# Patient Record
Sex: FEMALE | Race: BLACK OR AFRICAN AMERICAN | NOT HISPANIC OR LATINO | Employment: FULL TIME | URBAN - METROPOLITAN AREA
[De-identification: names, ages, dates, MRNs, and addresses within clinical notes are randomized per-mention and may not be internally consistent; named-entity substitution may affect disease eponyms.]

---

## 2017-06-10 DIAGNOSIS — F41.8 DEPRESSION WITH ANXIETY: ICD-10-CM

## 2017-06-10 DIAGNOSIS — F06.4 ANXIETY DISORDER DUE TO GENERAL MEDICAL CONDITION: ICD-10-CM

## 2017-06-12 RX ORDER — ALPRAZOLAM 0.5 MG/1
TABLET ORAL
Qty: 60 TABLET | Refills: 3 | OUTPATIENT
Start: 2017-06-12 | End: 2017-11-30 | Stop reason: SDUPTHER

## 2017-11-30 DIAGNOSIS — F41.8 DEPRESSION WITH ANXIETY: ICD-10-CM

## 2017-11-30 DIAGNOSIS — F06.4 ANXIETY DISORDER DUE TO GENERAL MEDICAL CONDITION: ICD-10-CM

## 2017-11-30 RX ORDER — ALPRAZOLAM 0.5 MG/1
TABLET ORAL
Qty: 60 TABLET | Refills: 5 | OUTPATIENT
Start: 2017-11-30 | End: 2018-01-24 | Stop reason: SDUPTHER

## 2017-12-31 DIAGNOSIS — F06.4 ANXIETY DISORDER DUE TO GENERAL MEDICAL CONDITION: ICD-10-CM

## 2017-12-31 DIAGNOSIS — G43.919 COMPLICATED MIGRAINE, INTRACTABLE: ICD-10-CM

## 2017-12-31 DIAGNOSIS — F41.8 DEPRESSION WITH ANXIETY: ICD-10-CM

## 2018-01-02 RX ORDER — PROPRANOLOL HYDROCHLORIDE 120 MG/1
CAPSULE, EXTENDED RELEASE ORAL
Qty: 30 CAPSULE | Refills: 0 | OUTPATIENT
Start: 2018-01-02

## 2018-01-02 RX ORDER — ALPRAZOLAM 0.5 MG/1
TABLET ORAL
Qty: 60 TABLET | Refills: 0 | OUTPATIENT
Start: 2018-01-02

## 2018-01-02 RX ORDER — OLMESARTAN MEDOXOMIL AND HYDROCHLOROTHIAZIDE 40; 25 MG/1; MG/1
TABLET, FILM COATED ORAL
Qty: 30 TABLET | Refills: 0 | OUTPATIENT
Start: 2018-01-02

## 2018-01-18 LAB
25(OH)D3+25(OH)D2 SERPL-MCNC: 69.3 NG/ML (ref 30–100)
ALBUMIN SERPL-MCNC: 4.5 G/DL (ref 3.5–5.2)
ALBUMIN/GLOB SERPL: 1.4 G/DL
ALP SERPL-CCNC: 72 U/L (ref 39–117)
ALT SERPL-CCNC: 22 U/L (ref 1–33)
APPEARANCE UR: ABNORMAL
AST SERPL-CCNC: 19 U/L (ref 1–32)
BILIRUB SERPL-MCNC: 0.5 MG/DL (ref 0.1–1.2)
BILIRUB UR QL STRIP: NEGATIVE
BUN SERPL-MCNC: 11 MG/DL (ref 6–20)
BUN/CREAT SERPL: 11.1 (ref 7–25)
CALCIUM SERPL-MCNC: 9.9 MG/DL (ref 8.6–10.5)
CHLORIDE SERPL-SCNC: 102 MMOL/L (ref 98–107)
CHOLEST SERPL-MCNC: 185 MG/DL (ref 100–199)
CO2 SERPL-SCNC: 25.6 MMOL/L (ref 22–29)
COLOR UR: YELLOW
CREAT SERPL-MCNC: 0.99 MG/DL (ref 0.57–1)
ERYTHROCYTE [DISTWIDTH] IN BLOOD BY AUTOMATED COUNT: 13.4 % (ref 11.7–13)
GLOBULIN SER CALC-MCNC: 3.2 GM/DL
GLUCOSE SERPL-MCNC: 95 MG/DL (ref 65–99)
GLUCOSE UR QL: NEGATIVE
HCT VFR BLD AUTO: 42.6 % (ref 35.6–45.5)
HDL SERPL-SCNC: 32.4 UMOL/L
HDLC SERPL-MCNC: 45 MG/DL
HGB BLD-MCNC: 14.2 G/DL (ref 11.9–15.5)
HGB UR QL STRIP: NEGATIVE
KETONES UR QL STRIP: NEGATIVE
LDL SERPL QN: 21 NM
LDL SERPL-SCNC: 1434 NMOL/L
LDL SMALL SERPL-SCNC: 474 NMOL/L
LDLC SERPL CALC-MCNC: 121 MG/DL (ref 0–99)
LEUKOCYTE ESTERASE UR QL STRIP: NEGATIVE
LP-IR SCORE SERPL: 56
MCH RBC QN AUTO: 30.9 PG (ref 26.9–32)
MCHC RBC AUTO-ENTMCNC: 33.3 G/DL (ref 32.4–36.3)
MCV RBC AUTO: 92.6 FL (ref 80.5–98.2)
NITRITE UR QL STRIP: NEGATIVE
PH UR STRIP: 5.5 [PH] (ref 5–8)
PLATELET # BLD AUTO: 318 10*3/MM3 (ref 140–500)
POTASSIUM SERPL-SCNC: 4 MMOL/L (ref 3.5–5.2)
PROT SERPL-MCNC: 7.7 G/DL (ref 6–8.5)
PROT UR QL STRIP: NEGATIVE
RBC # BLD AUTO: 4.6 10*6/MM3 (ref 3.9–5.2)
SODIUM SERPL-SCNC: 141 MMOL/L (ref 136–145)
SP GR UR: 1.02 (ref 1–1.03)
T3FREE SERPL-MCNC: 2.8 PG/ML (ref 2–4.4)
T4 FREE SERPL-MCNC: 1.14 NG/DL (ref 0.93–1.7)
TRIGL SERPL-MCNC: 93 MG/DL (ref 0–149)
TSH SERPL DL<=0.005 MIU/L-ACNC: 0.98 MIU/ML (ref 0.27–4.2)
UROBILINOGEN UR STRIP-MCNC: ABNORMAL MG/DL
WBC # BLD AUTO: 7.88 10*3/MM3 (ref 4.5–10.7)

## 2018-01-24 ENCOUNTER — OFFICE VISIT (OUTPATIENT)
Dept: INTERNAL MEDICINE | Facility: CLINIC | Age: 53
End: 2018-01-24

## 2018-01-24 ENCOUNTER — TRANSCRIBE ORDERS (OUTPATIENT)
Dept: ADMINISTRATIVE | Facility: HOSPITAL | Age: 53
End: 2018-01-24

## 2018-01-24 VITALS
HEIGHT: 62 IN | HEART RATE: 95 BPM | DIASTOLIC BLOOD PRESSURE: 82 MMHG | SYSTOLIC BLOOD PRESSURE: 140 MMHG | OXYGEN SATURATION: 99 % | WEIGHT: 190 LBS | BODY MASS INDEX: 34.96 KG/M2

## 2018-01-24 DIAGNOSIS — F06.4 ANXIETY DISORDER DUE TO GENERAL MEDICAL CONDITION: Chronic | ICD-10-CM

## 2018-01-24 DIAGNOSIS — Z11.59 NEED FOR HEPATITIS C SCREENING TEST: ICD-10-CM

## 2018-01-24 DIAGNOSIS — E78.5 HYPERLIPIDEMIA, UNSPECIFIED HYPERLIPIDEMIA TYPE: Chronic | ICD-10-CM

## 2018-01-24 DIAGNOSIS — Z01.419 ENCOUNTER FOR ANNUAL ROUTINE GYNECOLOGICAL EXAMINATION: ICD-10-CM

## 2018-01-24 DIAGNOSIS — E55.9 VITAMIN D DEFICIENCY: Chronic | ICD-10-CM

## 2018-01-24 DIAGNOSIS — Z12.39 BREAST CANCER SCREENING: ICD-10-CM

## 2018-01-24 DIAGNOSIS — Z23 NEED FOR TDAP VACCINATION: ICD-10-CM

## 2018-01-24 DIAGNOSIS — Z51.81 THERAPEUTIC DRUG MONITORING: ICD-10-CM

## 2018-01-24 DIAGNOSIS — Z00.00 ROUTINE PHYSICAL EXAMINATION: Primary | ICD-10-CM

## 2018-01-24 DIAGNOSIS — I10 BENIGN ESSENTIAL HYPERTENSION: Chronic | ICD-10-CM

## 2018-01-24 DIAGNOSIS — IMO0002 CHRONIC MIGRAINE: Chronic | ICD-10-CM

## 2018-01-24 DIAGNOSIS — I65.21 ATHEROSCLEROSIS OF RIGHT CAROTID ARTERY: Chronic | ICD-10-CM

## 2018-01-24 DIAGNOSIS — F41.8 DEPRESSION WITH ANXIETY: Chronic | ICD-10-CM

## 2018-01-24 DIAGNOSIS — Z12.31 SCREENING MAMMOGRAM, ENCOUNTER FOR: Primary | ICD-10-CM

## 2018-01-24 DIAGNOSIS — G43.919 COMPLICATED MIGRAINE, INTRACTABLE: ICD-10-CM

## 2018-01-24 PROCEDURE — 90715 TDAP VACCINE 7 YRS/> IM: CPT | Performed by: INTERNAL MEDICINE

## 2018-01-24 PROCEDURE — 99396 PREV VISIT EST AGE 40-64: CPT | Performed by: INTERNAL MEDICINE

## 2018-01-24 PROCEDURE — 90471 IMMUNIZATION ADMIN: CPT | Performed by: INTERNAL MEDICINE

## 2018-01-24 RX ORDER — PROPRANOLOL HYDROCHLORIDE 120 MG/1
120 CAPSULE, EXTENDED RELEASE ORAL DAILY
Qty: 30 CAPSULE | Refills: 6 | Status: SHIPPED | OUTPATIENT
Start: 2018-07-31 | End: 2018-09-04

## 2018-01-24 RX ORDER — ALPRAZOLAM 0.5 MG/1
TABLET ORAL
Qty: 60 TABLET | Refills: 5 | Status: SHIPPED | OUTPATIENT
Start: 2018-01-24 | End: 2018-08-02 | Stop reason: SDUPTHER

## 2018-01-24 RX ORDER — ALPRAZOLAM 0.5 MG/1
TABLET ORAL
Qty: 60 TABLET | Refills: 5 | OUTPATIENT
Start: 2018-01-24 | End: 2018-01-24 | Stop reason: SDUPTHER

## 2018-01-24 RX ORDER — OLMESARTAN MEDOXOMIL AND HYDROCHLOROTHIAZIDE 40/25 40; 25 MG/1; MG/1
1 TABLET ORAL DAILY
Qty: 30 TABLET | Refills: 9 | Status: SHIPPED | OUTPATIENT
Start: 2018-07-31 | End: 2019-01-30

## 2018-01-24 NOTE — PROGRESS NOTES
01/24/2018    Patient Information  Ruba Ohara                                                                                          [address] [facility] [address]      1965  [phone]    Chief Complaint:     Routine physical examination and follow-up lab work.  No new acute complaints.    History of Present Illness:    Patient with a history of hyperlipidemia, mild carotid artery plaque, hypertension anxiety disorder and history of migraine headaches.  She presents today for a routine annual physical examination and follow-up lab work.  She reports she has been out of her medications for about 2 weeks.  Past medical history reviewed and updated where necessary including health maintenance parameters.  This reveals she needs a Pap smear, mammogram, and hepatitis C screening.  She also needs a TDaP which we can give today.    Review of Systems   Constitution: Negative.   HENT: Negative.    Eyes: Negative.    Cardiovascular: Negative.    Respiratory: Negative.    Endocrine: Negative.    Hematologic/Lymphatic: Negative.    Skin: Negative.    Musculoskeletal: Negative.    Gastrointestinal: Negative.    Genitourinary: Negative.    Neurological: Negative.    Psychiatric/Behavioral: Negative.    Allergic/Immunologic: Negative.        Active Problems:    Patient Active Problem List   Diagnosis   • Anxiety disorder due to general medical condition   • Benign essential hypertension   • Carotid artery plaque, 10/29/2013--right ICA plaque.  Normal left ICA.   • Hirsutism   • Hyperlipidemia   • Migraine headaches   • Vitamin D deficiency   • Therapeutic drug monitoring   • Routine physical examination   • Depression with anxiety         Past Medical History:   Diagnosis Date   • Anxiety disorder due to general medical condition 4/14/2016   • Benign essential hypertension 4/12/2006 04/12/2006--treatment for hypertension begun.   • Carotid artery plaque,  10/29/2013--right ICA plaque.  Normal left ICA. 10/29/2013    10/29/2013--carotid Doppler revealed plaque without stenosis in the right internal carotid artery. There was no evidence of hemodynamically significant stenosis in the left carotid system. Antegrade flow was present in the left vertebral artery.   • Depression with anxiety 4/26/2016   • Hirsutism 4/14/2016   • History of carotid Doppler/vascular screen 10/29/2013    10/29/2013--carotid Doppler revealed plaque without stenosis in the right internal carotid artery. There was no evidence of hemodynamically significant stenosis in the left carotid system. Antegrade flow was present in the left vertebral artery.   • History of echocardiogram 10/29/2013    10/29/2013--echocardiogram performed for possible TIA symptoms revealed normal left ventricular size thickness and function. There were no significant valvular abnormalities.   • History of iron deficiency anemia 07/21/2015 07/21/2015--patient was admitted briefly to the hospital with a hemoglobin of 7.3. This was believed to be due to an episode of rectal bleeding, but primarily due to menorrhagia.   • History of mammogram 08/25/2015 08/25/2015--baseline screening mammogram reveals asymmetric density 12 o'clock position retroareolar right breast. Additional spot compression views in the CC and MLO projections with CC rolled views and medial lateral view recommended. No suspicious calcifications. Fibroglandular pattern in the left breast within normal limits. Scattered fibroglandular densities bilaterally. False-negative rate i   • History of meningioma 08/19/2014 08/19/2014--MRI again performed for left sided facial and arm numbness and tingling.  This reveals status post resection of a meningioma via a left temporoparietal craniotomy.  There is no evidence to suggest residual or recurrent tumor.  Again noted is a focus of encephalomalacia within the anterior portion of the left temporal lobe deep  to the craniotomy flap.  10/24/2013--MRI of the brain perfo   • History of Seizure disorder, grand mal 4/14/2016    This was related to a benign brain tumor of the left temporal lobe that is believed to be a meningioma. Status post resection and no recurrence of the seizures.   • Hyperlipidemia 2/7/2012 02/07/2012--treatment for hyperlipidemia begun.   • Migraine headaches 10/22/2013    08/19/2014--MRI again performed for left sided facial and arm numbness and tingling.  This reveals status post resection of a meningioma via a left temporoparietal craniotomy.  There is no evidence to suggest residual or recurrent tumor.  Again noted is a focus of encephalomalacia within the anterior portion of the left temporal lobe deep to the craniotomy flap.  10/29/2013--echocardiogram performed for possible TIA symptoms revealed normal left ventricular size thickness and function. There were no significant valvular abnormalities.  10/24/2013--MRI performed for possible TIA. Prior large 10 cm lateral left temporoparietal craniotomy and an additional 3.5 cm mid to inferior left  temporal craniectomy  by history resection of a meningioma. There is encephalomalacia in anterior inferior left temporal lobe which tracks approximately 3 x 2.1 cm. The remainder of the brain parenchyma is normal in signal intensity. Ventricles are normal. No mass effect noted. No midline shift and no extra-axial fluid collecti   • Vitamin D deficiency 4/14/2016         Past Surgical History:   Procedure Laterality Date   • BRAIN MENINGIOMA EXCISION  26 years old    26 years of age--status post resection of benign brain tumor believed to be a meningioma. Located in the anterior aspect of the left temporal lobe. 10/24/2013--MRI of the brain performed for possible TIA revealed surgical changes of a lateral left temporal parietal craniotomy as well as mid to inferior squamosal left temporal craniotomy consistent with patient's previous surgery. There was a  "3.5 x   • COLONOSCOPY  08/20/2015 08/20/2015--entirely normal screening colonoscopy.   • PARTIAL HYSTERECTOMY  2007 2007--partial vaginal hysterectomy.   • TUBAL ABDOMINAL LIGATION  28 years old    28 years old--tubal ligation.         No Known Allergies        Current Outpatient Prescriptions:   •  ALPRAZolam (XANAX) 0.5 MG tablet, TAKE 1 TABLET BY MOUTH TWICE DAILY, Disp: 60 tablet, Rfl: 5  •  aspirin  MG EC tablet, Take  by mouth daily., Disp: , Rfl:   •  Cholecalciferol (VITAMIN D3) 5000 UNITS capsule capsule, 1 by mouth daily as directed, Disp: 30 capsule, Rfl: 11  •  olmesartan-hydrochlorothiazide (BENICAR HCT) 40-25 MG per tablet, Take 1 tablet by mouth Daily., Disp: 30 tablet, Rfl: 11  •  propranolol LA (INDERAL LA) 120 MG 24 hr capsule, One by mouth daily for migraine and blood pressure, Disp: 30 capsule, Rfl: 11      Family History   Problem Relation Age of Onset   • Throat cancer Father    • Stroke Father    • Hypertension Other      Benign Essential   • Stroke Brother          Social History     Social History   • Marital status: Single     Spouse name: N/A   • Number of children: N/A   • Years of education: N/A     Occupational History   •  for [employer]      Social History Main Topics   • Smoking status: Never Smoker   • Smokeless tobacco: Never Used   • Alcohol use Yes      Comment: Socially   • Drug use: No   • Sexual activity: Yes     Partners: Male     Other Topics Concern   • Not on file     Social History Narrative         Vitals:    01/24/18 0911   BP: 140/82   Pulse: 95   SpO2: 99%   Weight: 86.2 kg (190 lb)   Height: 157.5 cm (62.01\")          Physical Exam:    General: Alert and oriented x 3, with appropriate affect; no acute distress.  HEENT: pupils equal, round, and reactive to light; extraocular movements intact; sclera nonicteric; nasal mucosa normal; pharynx normal; tympanic membranes and ear canals normal.  Neck: without JVD, thyromegaly, bruit, or adenopathy. "  Lungs: clear to auscultation in all fields.  Heart: auscultation reveals regular rate and rhythm without murmur, rub, gallop, or click.  Abdomen: is soft and nontender, without hepatosplenomegaly, mass or hernia. Normal bowel sounds.  GYN, Digital rectal exam, Breast; deferred to gynecologist.  Extremities: are without clubbing, cyanosis, or edema.  Vascular: no signs of peripheral arterial disease or venous insufficiency/varicosities.  Neurological: intact without focal deficit, including cranial and peripheral nerves.  Station and gait observed to be normal during ingress and egress from the examination area.  Sensation and deep tendon reflexes tested if clinically indicated and are normal.  Musculoskeletal: exam is normal, without signs of synovitis, significant degeneration or deformity. Skin examination: without rash or significant lesions.    Lab/other results:    NMR reveals a total cholesterol 185.  Triglycerides 93.  LDL particle number borderline at 1434.  However, small LDL particle numbers excellent at 474.  HDL particle number normal at 32.4.  CMP is normal.  Urinalysis normal.  CBC normal.  Thyroid function tests normal.  Vitamin D normal.    Assessment/Plan:     Diagnosis Plan   1. Routine physical examination     2. Hyperlipidemia, unspecified hyperlipidemia type     3. Carotid artery plaque, 10/29/2013--right ICA plaque.  Normal left ICA.     4. Benign essential hypertension     5. Anxiety disorder due to general medical condition     6. Depression with anxiety     7. Vitamin D deficiency     8. Migraine headaches     9. Therapeutic drug monitoring       Patient presents with essentially normal annual exam except for the following issues: She has hyperlipidemia but it is not bad enough to warrant medication.  Carotid artery plaque needs to be reassessed with a Doppler study.  Blood pressure is elevated because she's been out of her Benicar HCT.  She has been taking one half of the pill per day.   Anxiety controlled with Xanax.  Vitamin D is therapeutic.  She has migraine headaches and has been out of propranolol prophylaxis.    Plan is as follows: Refill medications.  Patient will follow-up in one year with lab prior for her annual physical schedule mammogram, GYN appointment, DEXA scan.  TDaP given.  We will do a hepatitis C screening next year.        Procedures

## 2018-02-01 ENCOUNTER — HOSPITAL ENCOUNTER (OUTPATIENT)
Dept: CARDIOLOGY | Facility: HOSPITAL | Age: 53
Discharge: HOME | End: 2018-02-01
Admitting: INTERNAL MEDICINE

## 2018-02-01 LAB
BH CV XLRA MEAS LEFT DIST CCA EDV: -32 CM/SEC
BH CV XLRA MEAS LEFT DIST CCA PSV: -96 CM/SEC
BH CV XLRA MEAS LEFT DIST ICA EDV: 42.7 CM/SEC
BH CV XLRA MEAS LEFT DIST ICA PSV: 97.7 CM/SEC
BH CV XLRA MEAS LEFT ICA/CCA RATIO: 1
BH CV XLRA MEAS LEFT MID ICA EDV: -36 CM/SEC
BH CV XLRA MEAS LEFT MID ICA PSV: -84.4 CM/SEC
BH CV XLRA MEAS LEFT PROX CCA EDV: 24.9 CM/SEC
BH CV XLRA MEAS LEFT PROX CCA PSV: 116 CM/SEC
BH CV XLRA MEAS LEFT PROX ECA EDV: -33.2 CM/SEC
BH CV XLRA MEAS LEFT PROX ECA PSV: -182 CM/SEC
BH CV XLRA MEAS LEFT PROX ICA EDV: -23.7 CM/SEC
BH CV XLRA MEAS LEFT PROX ICA PSV: -72.1 CM/SEC
BH CV XLRA MEAS LEFT PROX SCLA PSV: 175 CM/SEC
BH CV XLRA MEAS LEFT VERTEBRAL A EDV: 12.3 CM/SEC
BH CV XLRA MEAS LEFT VERTEBRAL A PSV: 50.3 CM/SEC
BH CV XLRA MEAS RIGHT CCA RATIO VEL: 76.4 CM/SEC
BH CV XLRA MEAS RIGHT DIST CCA EDV: 21.2 CM/SEC
BH CV XLRA MEAS RIGHT DIST CCA PSV: 76.4 CM/SEC
BH CV XLRA MEAS RIGHT DIST ICA EDV: -29.4 CM/SEC
BH CV XLRA MEAS RIGHT DIST ICA PSV: -92 CM/SEC
BH CV XLRA MEAS RIGHT ICA RATIO VEL: -92 CM/SEC
BH CV XLRA MEAS RIGHT ICA/CCA RATIO: -1.2
BH CV XLRA MEAS RIGHT MID ICA EDV: -31.3 CM/SEC
BH CV XLRA MEAS RIGHT MID ICA PSV: -71.1 CM/SEC
BH CV XLRA MEAS RIGHT PROX CCA EDV: 20.5 CM/SEC
BH CV XLRA MEAS RIGHT PROX CCA PSV: 89.2 CM/SEC
BH CV XLRA MEAS RIGHT PROX ECA EDV: -17 CM/SEC
BH CV XLRA MEAS RIGHT PROX ECA PSV: -151 CM/SEC
BH CV XLRA MEAS RIGHT PROX ICA EDV: -17.1 CM/SEC
BH CV XLRA MEAS RIGHT PROX ICA PSV: -71.5 CM/SEC
BH CV XLRA MEAS RIGHT PROX SCLA PSV: 212 CM/SEC
BH CV XLRA MEAS RIGHT VERTEBRAL A EDV: 18.4 CM/SEC
BH CV XLRA MEAS RIGHT VERTEBRAL A PSV: 53.8 CM/SEC
LEFT ARM BP: NORMAL MMHG
RIGHT ARM BP: NORMAL MMHG

## 2018-02-01 PROCEDURE — 93880 EXTRACRANIAL BILAT STUDY: CPT

## 2018-07-31 DIAGNOSIS — F06.4 ANXIETY DISORDER DUE TO GENERAL MEDICAL CONDITION: Chronic | ICD-10-CM

## 2018-07-31 DIAGNOSIS — F41.8 DEPRESSION WITH ANXIETY: Chronic | ICD-10-CM

## 2018-07-31 RX ORDER — ALPRAZOLAM 0.5 MG/1
TABLET ORAL
Qty: 60 TABLET | Refills: 5 | Status: CANCELLED | OUTPATIENT
Start: 2018-07-31

## 2018-08-02 DIAGNOSIS — F06.4 ANXIETY DISORDER DUE TO GENERAL MEDICAL CONDITION: Chronic | ICD-10-CM

## 2018-08-02 DIAGNOSIS — F41.8 DEPRESSION WITH ANXIETY: Chronic | ICD-10-CM

## 2018-08-02 RX ORDER — ALPRAZOLAM 0.5 MG/1
TABLET ORAL
Qty: 60 TABLET | Refills: 3 | OUTPATIENT
Start: 2018-08-02 | End: 2019-02-25

## 2018-08-20 ENCOUNTER — HOSPITAL ENCOUNTER (EMERGENCY)
Facility: HOSPITAL | Age: 53
Discharge: HOME | End: 2018-08-20
Attending: EMERGENCY MEDICINE | Admitting: EMERGENCY MEDICINE

## 2018-08-20 ENCOUNTER — APPOINTMENT (OUTPATIENT)
Dept: GENERAL RADIOLOGY | Facility: HOSPITAL | Age: 53
End: 2018-08-20

## 2018-08-20 VITALS
HEART RATE: 57 BPM | TEMPERATURE: 97.7 F | RESPIRATION RATE: 16 BRPM | DIASTOLIC BLOOD PRESSURE: 88 MMHG | HEIGHT: 62 IN | BODY MASS INDEX: 35.7 KG/M2 | WEIGHT: 194 LBS | SYSTOLIC BLOOD PRESSURE: 118 MMHG | OXYGEN SATURATION: 100 %

## 2018-08-20 DIAGNOSIS — R07.89 CHEST WALL PAIN: Primary | ICD-10-CM

## 2018-08-20 LAB
ALBUMIN SERPL-MCNC: 4.5 G/DL (ref 3.5–5.2)
ALBUMIN/GLOB SERPL: 1.6 G/DL
ALP SERPL-CCNC: 89 U/L (ref 39–117)
ALT SERPL W P-5'-P-CCNC: 23 U/L (ref 1–33)
ANION GAP SERPL CALCULATED.3IONS-SCNC: 9.8 MMOL/L
AST SERPL-CCNC: 17 U/L (ref 1–32)
BASOPHILS # BLD AUTO: 0.02 10*3/MM3 (ref 0–0.2)
BASOPHILS NFR BLD AUTO: 0.3 % (ref 0–1.5)
BILIRUB SERPL-MCNC: 0.4 MG/DL (ref 0.1–1.2)
BUN BLD-MCNC: 10 MG/DL (ref 6–20)
BUN/CREAT SERPL: 9.3 (ref 7–25)
CALCIUM SPEC-SCNC: 9.5 MG/DL (ref 8.6–10.5)
CHLORIDE SERPL-SCNC: 101 MMOL/L (ref 98–107)
CO2 SERPL-SCNC: 31.2 MMOL/L (ref 22–29)
CREAT BLD-MCNC: 1.07 MG/DL (ref 0.57–1)
D DIMER PPP FEU-MCNC: <0.27 MCGFEU/ML (ref 0–0.49)
DEPRECATED RDW RBC AUTO: 43.3 FL (ref 37–54)
EOSINOPHIL # BLD AUTO: 0.11 10*3/MM3 (ref 0–0.7)
EOSINOPHIL NFR BLD AUTO: 1.5 % (ref 0.3–6.2)
ERYTHROCYTE [DISTWIDTH] IN BLOOD BY AUTOMATED COUNT: 13.1 % (ref 11.7–13)
GFR SERPL CREATININE-BSD FRML MDRD: 65 ML/MIN/1.73
GLOBULIN UR ELPH-MCNC: 2.9 GM/DL
GLUCOSE BLD-MCNC: 97 MG/DL (ref 65–99)
HCT VFR BLD AUTO: 39.9 % (ref 35.6–45.5)
HGB BLD-MCNC: 13.7 G/DL (ref 11.9–15.5)
IMM GRANULOCYTES # BLD: 0.01 10*3/MM3 (ref 0–0.03)
IMM GRANULOCYTES NFR BLD: 0.1 % (ref 0–0.5)
LYMPHOCYTES # BLD AUTO: 1.92 10*3/MM3 (ref 0.9–4.8)
LYMPHOCYTES NFR BLD AUTO: 26.8 % (ref 19.6–45.3)
MCH RBC QN AUTO: 30.9 PG (ref 26.9–32)
MCHC RBC AUTO-ENTMCNC: 34.3 G/DL (ref 32.4–36.3)
MCV RBC AUTO: 89.9 FL (ref 80.5–98.2)
MONOCYTES # BLD AUTO: 0.53 10*3/MM3 (ref 0.2–1.2)
MONOCYTES NFR BLD AUTO: 7.4 % (ref 5–12)
NEUTROPHILS # BLD AUTO: 4.58 10*3/MM3 (ref 1.9–8.1)
NEUTROPHILS NFR BLD AUTO: 64 % (ref 42.7–76)
PLATELET # BLD AUTO: 242 10*3/MM3 (ref 140–500)
PMV BLD AUTO: 10.8 FL (ref 6–12)
POTASSIUM BLD-SCNC: 4.2 MMOL/L (ref 3.5–5.2)
PROT SERPL-MCNC: 7.4 G/DL (ref 6–8.5)
RBC # BLD AUTO: 4.44 10*6/MM3 (ref 3.9–5.2)
SODIUM BLD-SCNC: 142 MMOL/L (ref 136–145)
TROPONIN T SERPL-MCNC: <0.01 NG/ML (ref 0–0.03)
WBC NRBC COR # BLD: 7.16 10*3/MM3 (ref 4.5–10.7)

## 2018-08-20 PROCEDURE — 96374 THER/PROPH/DIAG INJ IV PUSH: CPT

## 2018-08-20 PROCEDURE — 84484 ASSAY OF TROPONIN QUANT: CPT | Performed by: EMERGENCY MEDICINE

## 2018-08-20 PROCEDURE — 85379 FIBRIN DEGRADATION QUANT: CPT | Performed by: EMERGENCY MEDICINE

## 2018-08-20 PROCEDURE — 25010000002 KETOROLAC TROMETHAMINE PER 15 MG: Performed by: EMERGENCY MEDICINE

## 2018-08-20 PROCEDURE — 99283 EMERGENCY DEPT VISIT LOW MDM: CPT

## 2018-08-20 PROCEDURE — 71046 X-RAY EXAM CHEST 2 VIEWS: CPT

## 2018-08-20 PROCEDURE — 93010 ELECTROCARDIOGRAM REPORT: CPT | Performed by: INTERNAL MEDICINE

## 2018-08-20 PROCEDURE — 93005 ELECTROCARDIOGRAM TRACING: CPT | Performed by: EMERGENCY MEDICINE

## 2018-08-20 PROCEDURE — 85025 COMPLETE CBC W/AUTO DIFF WBC: CPT | Performed by: EMERGENCY MEDICINE

## 2018-08-20 PROCEDURE — 80053 COMPREHEN METABOLIC PANEL: CPT | Performed by: EMERGENCY MEDICINE

## 2018-08-20 RX ORDER — NAPROXEN SODIUM 550 MG/1
550 TABLET ORAL 2 TIMES DAILY WITH MEALS
Qty: 20 TABLET | Refills: 0 | Status: SHIPPED | OUTPATIENT
Start: 2018-08-20 | End: 2019-07-25 | Stop reason: SDUPTHER

## 2018-08-20 RX ORDER — SODIUM CHLORIDE 0.9 % (FLUSH) 0.9 %
10 SYRINGE (ML) INJECTION AS NEEDED
Status: DISCONTINUED | OUTPATIENT
Start: 2018-08-20 | End: 2018-08-20 | Stop reason: HOSPADM

## 2018-08-20 RX ORDER — KETOROLAC TROMETHAMINE 15 MG/ML
15 INJECTION, SOLUTION INTRAMUSCULAR; INTRAVENOUS ONCE
Status: COMPLETED | OUTPATIENT
Start: 2018-08-20 | End: 2018-08-20

## 2018-08-20 RX ADMIN — KETOROLAC TROMETHAMINE 15 MG: 15 INJECTION, SOLUTION INTRAMUSCULAR; INTRAVENOUS at 09:03

## 2018-08-20 NOTE — ED TRIAGE NOTES
"Patient states \"I am having left sided chest pain that began about 8 hours ago.\" Patient also complains of dizziness and headache.  "

## 2018-08-20 NOTE — ED PROVIDER NOTES
EMERGENCY DEPARTMENT ENCOUNTER    Room Number:  12/12  Date seen:  8/20/2018  Time seen: 8:23 AM  PCP: Víctor Pike MD  Historian: patient, family  History Limited By: N/A      HPI:  Chief Complaint: chest pain  Context: Ruba Ohara is a 53 y.o. female who states that she has had recent increased stress. She presents to the ED c/o constant nonradiating sharp left chest pain that started at about 0000 today. It is exacerbated by palpating the left chest, stress, cough, and taking deep breaths. She reports that she has also had dizziness and dry cough. She denies nausea, vomiting, sweating, dyspnea, BLE swelling, syncope, focal weakness, numbness, vision changes, abd pain, fevers, chills, recent travel, and personal hx of heart disease/hyperlipidemia/diabetes. She notes that she had similar chest pain in the past but did not undergo cardiac workup. She has hx of HTN and is a nonsmoker. Pt has no other complaints at this time.     Location: left chest  Radiation: none  Quality: sharp  Intensity/Severity: moderate  Duration: started today at about 0000  Onset quality: gradual  Timing: constant  Progression: unchanged  Aggravating Factors: palpating left chest, stress, cough, taking deep breaths  Alleviating Factors: none  Previous Episodes: Pt notes that she had similar chest pain in the past but did not undergo cardiac workup.  Treatment before arrival: none  Associated Symptoms: dizziness, dry cough        PAST MEDICAL HISTORY  Active Ambulatory Problems     Diagnosis Date Noted   • Anxiety disorder due to general medical condition 04/14/2016   • Benign essential hypertension 04/12/2006   • Carotid artery plaque, 10/29/2013--right ICA plaque.  Normal left ICA. 10/29/2013   • Hirsutism 04/14/2016   • Hyperlipidemia 02/07/2012   • Migraine headaches 10/22/2013   • Vitamin D deficiency 04/14/2016   • Therapeutic drug monitoring 04/25/2016   • Routine physical examination 04/25/2016   • Depression with  anxiety 04/26/2016     Resolved Ambulatory Problems     Diagnosis Date Noted   • History of transient cerebral ischemia 04/14/2016   • History of Doppler ultrasound of Artery: Carotid 04/14/2016   • History of echocardiogram 04/14/2016   • History of mammogram 04/14/2016   • History of Iron deficiency anemia due to chronic blood loss 04/14/2016   • History of Seizure disorder, grand mal 04/14/2016   • Complicated migraine, intractable 10/22/2013   • History of meningioma 04/26/2016   • Family history of throat cancer 11/21/2016     Past Medical History:   Diagnosis Date   • Anxiety disorder due to general medical condition 4/14/2016   • Benign essential hypertension 4/12/2006   • Carotid artery plaque, 10/29/2013--right ICA plaque.  Normal left ICA. 10/29/2013   • Depression with anxiety 4/26/2016   • Hirsutism 4/14/2016   • History of carotid Doppler/vascular screen 10/29/2013   • History of echocardiogram 10/29/2013   • History of iron deficiency anemia 07/21/2015   • History of mammogram 08/25/2015   • History of meningioma 08/19/2014   • History of Seizure disorder, grand mal 4/14/2016   • Hyperlipidemia 2/7/2012   • Migraine headaches 10/22/2013   • Vitamin D deficiency 4/14/2016         PAST SURGICAL HISTORY  Past Surgical History:   Procedure Laterality Date   • BRAIN MENINGIOMA EXCISION  26 years old    26 years of age--status post resection of benign brain tumor believed to be a meningioma. Located in the anterior aspect of the left temporal lobe. 10/24/2013--MRI of the brain performed for possible TIA revealed surgical changes of a lateral left temporal parietal craniotomy as well as mid to inferior squamosal left temporal craniotomy consistent with patient's previous surgery. There was a 3.5 x   • COLONOSCOPY  08/20/2015 08/20/2015--entirely normal screening colonoscopy.   • PARTIAL HYSTERECTOMY  2007 2007--partial vaginal hysterectomy.   • TUBAL ABDOMINAL LIGATION  28 years old    28 years  old--tubal ligation.         FAMILY HISTORY  Family History   Problem Relation Age of Onset   • Throat cancer Father    • Stroke Father    • Hypertension Other         Benign Essential   • Stroke Brother          SOCIAL HISTORY  Social History     Social History   • Marital status:      Spouse name: N/A   • Number of children: N/A   • Years of education: N/A     Occupational History   •  for [employer]      Social History Main Topics   • Smoking status: Never Smoker   • Smokeless tobacco: Never Used   • Alcohol use Yes      Comment: Socially   • Drug use: No   • Sexual activity: Yes     Partners: Male     Other Topics Concern   • Not on file     Social History Narrative   • No narrative on file         ALLERGIES  Patient has no known allergies.      REVIEW OF SYSTEMS  Review of Systems   Constitutional: Negative for chills and fever.   HENT: Negative for congestion, rhinorrhea and sore throat.    Eyes: Negative for pain.   Respiratory: Positive for cough (dry cough). Negative for shortness of breath.    Cardiovascular: Positive for chest pain (left chest pain). Negative for palpitations.   Gastrointestinal: Negative for abdominal pain, diarrhea, nausea and vomiting.   Endocrine: Negative.    Genitourinary: Negative for difficulty urinating.   Musculoskeletal: Negative for myalgias.   Skin: Negative.    Neurological: Positive for dizziness. Negative for speech difficulty, weakness, numbness and headaches.   Psychiatric/Behavioral: Negative.    All other systems reviewed and are negative.           PHYSICAL EXAM  ED Triage Vitals   Temp Heart Rate Resp BP SpO2   08/20/18 0822 08/20/18 0819 08/20/18 0819 08/20/18 0822 08/20/18 0819   97.5 °F (36.4 °C) 77 16 132/81 97 % WNL      Temp src Heart Rate Source Patient Position BP Location FiO2 (%)   08/20/18 0822 08/20/18 0819 -- -- --   Oral Monitor          Physical Exam   Constitutional: She is oriented to person, place, and time. No distress.   HENT:    Head: Normocephalic.   Mouth/Throat: Mucous membranes are normal.   Eyes: Pupils are equal, round, and reactive to light. EOM are normal.   Neck: Normal range of motion. Neck supple.   Cardiovascular: Normal rate, regular rhythm and normal heart sounds.    Pulmonary/Chest: Effort normal and breath sounds normal. No respiratory distress. She has no decreased breath sounds. She has no wheezes. She has no rhonchi. She has no rales. She exhibits tenderness (anterior left chest wall tenderness that reproduces chest pain, no crepitus or subQ air).   Abdominal: Soft. There is no tenderness. There is no rebound and no guarding.   Musculoskeletal: Normal range of motion. She exhibits no edema (no BLE edema).   Neurological: She is alert and oriented to person, place, and time. She has normal motor skills and normal sensation.   Skin: Skin is warm and dry.   Psychiatric: Mood and affect normal.   Nursing note and vitals reviewed.          LAB RESULTS  Recent Results (from the past 24 hour(s))   Comprehensive Metabolic Panel    Collection Time: 08/20/18  8:46 AM   Result Value Ref Range    Glucose 97 65 - 99 mg/dL    BUN 10 6 - 20 mg/dL    Creatinine 1.07 (H) 0.57 - 1.00 mg/dL    Sodium 142 136 - 145 mmol/L    Potassium 4.2 3.5 - 5.2 mmol/L    Chloride 101 98 - 107 mmol/L    CO2 31.2 (H) 22.0 - 29.0 mmol/L    Calcium 9.5 8.6 - 10.5 mg/dL    Total Protein 7.4 6.0 - 8.5 g/dL    Albumin 4.50 3.50 - 5.20 g/dL    ALT (SGPT) 23 1 - 33 U/L    AST (SGOT) 17 1 - 32 U/L    Alkaline Phosphatase 89 39 - 117 U/L    Total Bilirubin 0.4 0.1 - 1.2 mg/dL    eGFR  African Amer 65 >60 mL/min/1.73    Globulin 2.9 gm/dL    A/G Ratio 1.6 g/dL    BUN/Creatinine Ratio 9.3 7.0 - 25.0    Anion Gap 9.8 mmol/L   D-dimer, Quantitative    Collection Time: 08/20/18  8:46 AM   Result Value Ref Range    D-Dimer, Quantitative <0.27 0.00 - 0.49 MCGFEU/mL   Troponin    Collection Time: 08/20/18  8:46 AM   Result Value Ref Range    Troponin T <0.010 0.000 -  0.030 ng/mL   CBC Auto Differential    Collection Time: 08/20/18  8:46 AM   Result Value Ref Range    WBC 7.16 4.50 - 10.70 10*3/mm3    RBC 4.44 3.90 - 5.20 10*6/mm3    Hemoglobin 13.7 11.9 - 15.5 g/dL    Hematocrit 39.9 35.6 - 45.5 %    MCV 89.9 80.5 - 98.2 fL    MCH 30.9 26.9 - 32.0 pg    MCHC 34.3 32.4 - 36.3 g/dL    RDW 13.1 (H) 11.7 - 13.0 %    RDW-SD 43.3 37.0 - 54.0 fl    MPV 10.8 6.0 - 12.0 fL    Platelets 242 140 - 500 10*3/mm3    Neutrophil % 64.0 42.7 - 76.0 %    Lymphocyte % 26.8 19.6 - 45.3 %    Monocyte % 7.4 5.0 - 12.0 %    Eosinophil % 1.5 0.3 - 6.2 %    Basophil % 0.3 0.0 - 1.5 %    Immature Grans % 0.1 0.0 - 0.5 %    Neutrophils, Absolute 4.58 1.90 - 8.10 10*3/mm3    Lymphocytes, Absolute 1.92 0.90 - 4.80 10*3/mm3    Monocytes, Absolute 0.53 0.20 - 1.20 10*3/mm3    Eosinophils, Absolute 0.11 0.00 - 0.70 10*3/mm3    Basophils, Absolute 0.02 0.00 - 0.20 10*3/mm3    Immature Grans, Absolute 0.01 0.00 - 0.03 10*3/mm3       Ordered the above labs and reviewed the results.        RADIOLOGY  XR Chest 2 View (Final result)   Result time 08/20/18 09:32:24 (independently viewed by me, interpreted by radiologist)    Final result by TheKirk osei MD (08/20/18 09:32:24)                Narrative:    XR CHEST 2 VW-     Clinical: Left-sided chest pain     COMPARISON 7/22/2005     FINDINGS: There are monitoring leads superimposing the chest. The heart  size is normal. The mediastinum is satisfactory in appearance. No  pleural effusion, acute consolidation or pulmonary edema identified.     CONCLUSION: No acute cardiovascular or pulmonary process identified.     This report was finalized on 8/20/2018 9:32 AM by Dr. Kirk Gutierrez M.D.                     Ordered the above noted radiological studies. Reviewed by me in PACS.          PROCEDURES  Procedures        EKG:           EKG time: 0825  Rhythm/Rate: sinus rhythm, rate= 56  P waves and [place]: normal, normal  QRS, axis: normal, normal   ST and T waves: normal      Interpreted Contemporaneously by me, independently viewed  unchanged compared to prior 4/17/14          PROGRESS AND CONSULTS     0827- Ordered toradol for pain. Ordered CXR, blood work, d-dimer, troponin, and EKG for further evaluation.     0941- Rechecked pt. She is resting comfortably and is in no acute distress. Discussed with pt and family about all pertinent results including normal WBC count, negative troponin, normal d-dimer, otherwise stable labs, stable EKG findings, and CXR findings (no acute process). Informed pt of dx of chest wall pain for which she will be prescribed NSAID. Instructed to f/u closely with PMD for recheck if sx worsen or do not improve. RTER warnings given. Pt understands and agrees with plan. Addressed all questions.      MEDICAL DECISION MAKING      MDM  Number of Diagnoses or Management Options  Chest wall pain:      Amount and/or Complexity of Data Reviewed  Clinical lab tests: reviewed and ordered (BUN= 10, creatinine= 1.07, d-dimer <0.27, troponin <0.01, WBC= 7.16)  Tests in the radiology section of CPT®: reviewed and ordered (CXR- no acute process)  Tests in the medicine section of CPT®: ordered and reviewed (EKG)  Independent visualization of images, tracings, or specimens: yes    Patient Progress  Patient progress: stable             DIAGNOSIS  Final diagnoses:   Chest wall pain         DISPOSITION  DISCHARGE    Patient discharged in stable condition.    Reviewed implications of results, diagnosis, meds, responsibility to follow up, warning signs and symptoms of possible worsening, potential complications and reasons to return to ER.    Patient/Family voiced understanding of above instructions.    Discussed plan for discharge, as there is no emergent indication for admission.  Pt/family is agreeable and understands need for follow up and repeat testing.  Pt is aware that discharge does not mean that nothing is wrong but it indicates no emergency is present and they must  continue care with follow-up as given below or physician of their choice.     FOLLOW-UP  Víctor Pike MD  [id number] [address]  [phone]    Schedule an appointment as soon as possible for a visit   If symptoms worsen or do not improve      DISCHARGE MEDICATIONS     Medication List      New Prescriptions    naproxen sodium 550 MG tablet  Commonly known as:  ANAPROX  Take 1 tablet by mouth 2 (Two) Times a Day With Meals.            Latest Documented Vital Signs:  As of 9:44 AM  BP- 132/81 HR- 57 Temp- 97.5 °F (36.4 °C) (Oral) O2 sat- 100%        --  Documentation assistance provided by min Diaz for Dr. Lucy MD.  Information recorded by the scribe was done at my direction and has been verified and validated by me.     Marv Diaz  08/20/18 0925       Chapito Ayala MD  08/20/18 0530

## 2018-08-31 ENCOUNTER — TELEPHONE (OUTPATIENT)
Dept: INTERNAL MEDICINE | Facility: CLINIC | Age: 53
End: 2018-08-31

## 2018-09-04 RX ORDER — PROPRANOLOL HYDROCHLORIDE 20 MG/1
20 TABLET ORAL 3 TIMES DAILY
Qty: 90 TABLET | Refills: 2 | Status: SHIPPED | OUTPATIENT
Start: 2018-09-04 | End: 2018-11-26 | Stop reason: SDUPTHER

## 2018-09-04 NOTE — TELEPHONE ENCOUNTER
We could put her on short acting propranolol but this would require her to take it 3 times daily.  If she remains insistent, have her start generic Inderal 20 mg, one by mouth 3 times a day.

## 2018-11-26 RX ORDER — PROPRANOLOL HYDROCHLORIDE 20 MG/1
20 TABLET ORAL 3 TIMES DAILY
Qty: 90 TABLET | Refills: 0 | Status: SHIPPED | OUTPATIENT
Start: 2018-11-26 | End: 2019-01-09 | Stop reason: SDUPTHER

## 2019-01-03 DIAGNOSIS — Z00.00 ROUTINE PHYSICAL EXAMINATION: ICD-10-CM

## 2019-01-03 DIAGNOSIS — Z11.59 NEED FOR HEPATITIS C SCREENING TEST: ICD-10-CM

## 2019-01-03 DIAGNOSIS — E78.5 HYPERLIPIDEMIA, UNSPECIFIED HYPERLIPIDEMIA TYPE: Chronic | ICD-10-CM

## 2019-01-06 LAB
25(OH)D3+25(OH)D2 SERPL-MCNC: 85.3 NG/ML (ref 30–100)
ALBUMIN SERPL-MCNC: 4.1 G/DL (ref 3.5–5.2)
ALBUMIN/GLOB SERPL: 1.5 G/DL
ALP SERPL-CCNC: 90 U/L (ref 39–117)
ALT SERPL-CCNC: 21 U/L (ref 1–33)
APPEARANCE UR: CLEAR
AST SERPL-CCNC: 16 U/L (ref 1–32)
BILIRUB SERPL-MCNC: 0.3 MG/DL (ref 0.1–1.2)
BILIRUB UR QL STRIP: NEGATIVE
BUN SERPL-MCNC: 11 MG/DL (ref 6–20)
BUN/CREAT SERPL: 10.8 (ref 7–25)
CALCIUM SERPL-MCNC: 10.1 MG/DL (ref 8.6–10.5)
CHLORIDE SERPL-SCNC: 101 MMOL/L (ref 98–107)
CHOLEST SERPL-MCNC: 173 MG/DL (ref 100–199)
CO2 SERPL-SCNC: 27.8 MMOL/L (ref 22–29)
COLOR UR: YELLOW
CREAT SERPL-MCNC: 1.02 MG/DL (ref 0.57–1)
ERYTHROCYTE [DISTWIDTH] IN BLOOD BY AUTOMATED COUNT: 13.5 % (ref 11.7–13)
GLOBULIN SER CALC-MCNC: 2.7 GM/DL
GLUCOSE SERPL-MCNC: 93 MG/DL (ref 65–99)
GLUCOSE UR QL: NEGATIVE
HCT VFR BLD AUTO: 39.2 % (ref 35.6–45.5)
HCV AB S/CO SERPL IA: <0.1 S/CO RATIO (ref 0–0.9)
HDL SERPL-SCNC: 26.4 UMOL/L
HDLC SERPL-MCNC: 38 MG/DL
HGB BLD-MCNC: 13.4 G/DL (ref 11.9–15.5)
HGB UR QL STRIP: NEGATIVE
KETONES UR QL STRIP: NEGATIVE
LDL SERPL QN: 21.2 NM
LDL SERPL-SCNC: 1527 NMOL/L
LDL SMALL SERPL-SCNC: 744 NMOL/L
LDLC SERPL CALC-MCNC: 109 MG/DL (ref 0–99)
LEUKOCYTE ESTERASE UR QL STRIP: NEGATIVE
MCH RBC QN AUTO: 30.5 PG (ref 26.9–32)
MCHC RBC AUTO-ENTMCNC: 34.2 G/DL (ref 32.4–36.3)
MCV RBC AUTO: 89.1 FL (ref 80.5–98.2)
NITRITE UR QL STRIP: NEGATIVE
PH UR STRIP: 5.5 [PH] (ref 5–8)
PLATELET # BLD AUTO: 296 10*3/MM3 (ref 140–500)
POTASSIUM SERPL-SCNC: 4.5 MMOL/L (ref 3.5–5.2)
PROT SERPL-MCNC: 6.8 G/DL (ref 6–8.5)
PROT UR QL STRIP: NEGATIVE
RBC # BLD AUTO: 4.4 10*6/MM3 (ref 3.9–5.2)
SODIUM SERPL-SCNC: 140 MMOL/L (ref 136–145)
SP GR UR: 1.02 (ref 1–1.03)
T3FREE SERPL-MCNC: 2.9 PG/ML (ref 2–4.4)
T4 FREE SERPL-MCNC: 1.45 NG/DL (ref 0.93–1.7)
TRIGL SERPL-MCNC: 131 MG/DL (ref 0–149)
TSH SERPL DL<=0.005 MIU/L-ACNC: 1.45 MIU/ML (ref 0.27–4.2)
UROBILINOGEN UR STRIP-MCNC: NORMAL MG/DL
WBC # BLD AUTO: 7.54 10*3/MM3 (ref 4.5–10.7)

## 2019-01-09 RX ORDER — PROPRANOLOL HYDROCHLORIDE 20 MG/1
20 TABLET ORAL 3 TIMES DAILY
Qty: 90 TABLET | Refills: 0 | Status: SHIPPED | OUTPATIENT
Start: 2019-01-09 | End: 2019-03-12

## 2019-01-30 DIAGNOSIS — I10 BENIGN ESSENTIAL HYPERTENSION: Chronic | ICD-10-CM

## 2019-01-30 RX ORDER — ALPRAZOLAM 0.5 MG/1
0.5 TABLET ORAL 2 TIMES DAILY PRN
Qty: 60 TABLET | Refills: 0 | OUTPATIENT
Start: 2019-01-30 | End: 2019-02-25

## 2019-01-30 RX ORDER — OLMESARTAN MEDOXOMIL AND HYDROCHLOROTHIAZIDE 40/25 40; 25 MG/1; MG/1
1 TABLET ORAL DAILY
Qty: 30 TABLET | Refills: 0 | Status: SHIPPED | OUTPATIENT
Start: 2019-01-30 | End: 2019-02-27 | Stop reason: SDUPTHER

## 2019-02-25 ENCOUNTER — OFFICE VISIT (OUTPATIENT)
Dept: INTERNAL MEDICINE | Facility: CLINIC | Age: 54
End: 2019-02-25

## 2019-02-25 VITALS
DIASTOLIC BLOOD PRESSURE: 86 MMHG | HEIGHT: 62 IN | OXYGEN SATURATION: 99 % | SYSTOLIC BLOOD PRESSURE: 136 MMHG | WEIGHT: 193.6 LBS | HEART RATE: 75 BPM | BODY MASS INDEX: 35.63 KG/M2

## 2019-02-25 DIAGNOSIS — IMO0002 CHRONIC MIGRAINE: Chronic | ICD-10-CM

## 2019-02-25 DIAGNOSIS — I10 BENIGN ESSENTIAL HYPERTENSION: Chronic | ICD-10-CM

## 2019-02-25 DIAGNOSIS — F41.8 DEPRESSION WITH ANXIETY: Chronic | ICD-10-CM

## 2019-02-25 DIAGNOSIS — E55.9 VITAMIN D DEFICIENCY: Chronic | ICD-10-CM

## 2019-02-25 DIAGNOSIS — Z51.81 THERAPEUTIC DRUG MONITORING: ICD-10-CM

## 2019-02-25 DIAGNOSIS — Z12.31 BREAST CANCER SCREENING BY MAMMOGRAM: ICD-10-CM

## 2019-02-25 DIAGNOSIS — I65.21 ATHEROSCLEROSIS OF RIGHT CAROTID ARTERY: Chronic | ICD-10-CM

## 2019-02-25 DIAGNOSIS — E78.5 HYPERLIPIDEMIA, UNSPECIFIED HYPERLIPIDEMIA TYPE: Chronic | ICD-10-CM

## 2019-02-25 DIAGNOSIS — F06.4 ANXIETY DISORDER DUE TO GENERAL MEDICAL CONDITION: Chronic | ICD-10-CM

## 2019-02-25 DIAGNOSIS — E66.01 MORBID OBESITY (HCC): ICD-10-CM

## 2019-02-25 DIAGNOSIS — Z00.00 ROUTINE PHYSICAL EXAMINATION: Primary | ICD-10-CM

## 2019-02-25 PROCEDURE — 99396 PREV VISIT EST AGE 40-64: CPT | Performed by: INTERNAL MEDICINE

## 2019-02-25 RX ORDER — SIMVASTATIN 10 MG
TABLET ORAL
Qty: 30 TABLET | Refills: 6 | Status: SHIPPED | OUTPATIENT
Start: 2019-02-25 | End: 2019-04-29 | Stop reason: SDUPTHER

## 2019-02-25 NOTE — PROGRESS NOTES
02/25/2019    Patient Information  Ruab Ohara                                                                                          [address] [address]      1965  [unfilled]  [phone] (work)    Chief Complaint:     Routine annual physical examination and follow-up lab work.  No new acute complaints.    History of Present Illness:    Patient with a history of anxiety, hypertension, carotid artery plaque, mild hyperlipidemia, chronic migraine headaches.  She presents today for routine annual exam and follow-up lab work.  She currently has no new acute complaints.  Her past medical history reviewed and updated were necessary including health maintenance parameters.  This reveals she needs a mammogram and also the new shingles vaccine.  She is up-to-date on her colonoscopy.    Review of Systems   Constitution: Negative.   HENT: Negative.    Eyes: Negative.    Cardiovascular: Negative.    Respiratory: Negative.    Endocrine: Negative.    Hematologic/Lymphatic: Negative.    Skin: Negative.    Musculoskeletal: Negative.    Gastrointestinal: Negative.    Genitourinary: Negative.    Neurological: Negative.    Psychiatric/Behavioral: Negative.    Allergic/Immunologic: Negative.        Active Problems:    Patient Active Problem List   Diagnosis   • Anxiety disorder due to general medical condition   • Benign essential hypertension   • Carotid artery plaque, 02/01/2018--16-49% bilateral.  10/29/2013--right ICA plaque.  Normal left ICA.   • Hirsutism   • Hyperlipidemia   • Migraine headaches   • Vitamin D deficiency   • Therapeutic drug monitoring   • Routine physical examination   • Depression with anxiety         Past Medical History:   Diagnosis Date   • Anxiety disorder due to general medical condition 4/14/2016   • Benign essential hypertension 4/12/2006 04/12/2006--treatment for hypertension begun.   • Carotid artery plaque, 02/01/2018--16-49% bilateral.   10/29/2013--right ICA plaque.  Normal left ICA. 10/29/2013    February 1, 2018--carotid Doppler study revealed 16-49% bilateral carotid artery plaque.  10/29/2013--carotid Doppler revealed plaque without stenosis in the right internal carotid artery. There was no evidence of hemodynamically significant stenosis in the left carotid system. Antegrade flow was present in the left vertebral artery.   • Depression with anxiety 4/26/2016   • Hirsutism 4/14/2016   • History of iron deficiency anemia 07/21/2015 07/21/2015--patient was admitted briefly to the hospital with a hemoglobin of 7.3. This was believed to be due to an episode of rectal bleeding, but primarily due to menorrhagia.   • History of meningioma 08/19/2014 08/19/2014--MRI again performed for left sided facial and arm numbness and tingling.  This reveals status post resection of a meningioma via a left temporoparietal craniotomy.  There is no evidence to suggest residual or recurrent tumor.  Again noted is a focus of encephalomalacia within the anterior portion of the left temporal lobe deep to the craniotomy flap.  10/24/2013--MRI of the brain perfo   • History of Seizure disorder, grand mal 4/14/2016    This was related to a benign brain tumor of the left temporal lobe that is believed to be a meningioma. Status post resection and no recurrence of the seizures.   • Hyperlipidemia 2/7/2012 02/07/2012--treatment for hyperlipidemia begun.   • Migraine headaches 10/22/2013    08/19/2014--MRI again performed for left sided facial and arm numbness and tingling.  This reveals status post resection of a meningioma via a left temporoparietal craniotomy.  There is no evidence to suggest residual or recurrent tumor.  Again noted is a focus of encephalomalacia within the anterior portion of the left temporal lobe deep to the craniotomy flap.  10/29/2013--echocardiogram performed for possible TIA symptoms revealed normal left ventricular size thickness and  function. There were no significant valvular abnormalities.  10/24/2013--MRI performed for possible TIA. Prior large 10 cm lateral left temporoparietal craniotomy and an additional 3.5 cm mid to inferior left  temporal craniectomy  by history resection of a meningioma. There is encephalomalacia in anterior inferior left temporal lobe which tracks approximately 3 x 2.1 cm. The remainder of the brain parenchyma is normal in signal intensity. Ventricles are normal. No mass effect noted. No midline shift and no extra-axial fluid collecti   • Vitamin D deficiency 4/14/2016         Past Surgical History:   Procedure Laterality Date   • BRAIN MENINGIOMA EXCISION  26 years old    26 years of age--status post resection of benign brain tumor believed to be a meningioma. Located in the anterior aspect of the left temporal lobe. 10/24/2013--MRI of the brain performed for possible TIA revealed surgical changes of a lateral left temporal parietal craniotomy as well as mid to inferior squamosal left temporal craniotomy consistent with patient's previous surgery. There was a 3.5 x   • COLONOSCOPY  08/20/2015 08/20/2015--entirely normal screening colonoscopy.   • PARTIAL HYSTERECTOMY  2007 2007--partial vaginal hysterectomy.   • TUBAL ABDOMINAL LIGATION  28 years old    28 years old--tubal ligation.         No Known Allergies        Current Outpatient Medications:   •  ALPRAZolam (XANAX) 0.25 MG tablet, Take 1 tablet by mouth 2 (Two) Times a Day., Disp: 60 tablet, Rfl: 0  •  aspirin  MG EC tablet, Take  by mouth daily., Disp: , Rfl:   •  Cholecalciferol (VITAMIN D3) 5000 UNITS capsule capsule, 1 by mouth daily as directed, Disp: 30 capsule, Rfl: 11  •  olmesartan-hydrochlorothiazide (BENICAR HCT) 40-25 MG per tablet, Take 1 tablet by mouth Daily for blood pressure., Disp: 30 tablet, Rfl: 0  •  propranolol (INDERAL) 20 MG tablet, Take 1 tablet by mouth 3 (Three) Times a Day., Disp: 90 tablet, Rfl: 0  •  naproxen sodium  "(ANAPROX) 550 MG tablet, Take 1 tablet by mouth 2 (Two) Times a Day With Meals., Disp: 20 tablet, Rfl: 0      Family History   Problem Relation Age of Onset   • Throat cancer Father    • Stroke Father    • Hypertension Other         Benign Essential   • Stroke Brother          Social History     Socioeconomic History   • Marital status: Legally      Spouse name: Not on file   • Number of children: Not on file   • Years of education: Not on file   • Highest education level: Not on file   Social Needs   • Financial resource strain: Not hard at all   • Food insecurity - worry: Never true   • Food insecurity - inability: Never true   • Transportation needs - medical: No   • Transportation needs - non-medical: No   Occupational History   • Occupation:  for [employer]   Tobacco Use   • Smoking status: Never Smoker   • Smokeless tobacco: Never Used   Substance and Sexual Activity   • Alcohol use: Yes     Frequency: 2-4 times a month     Drinks per session: 1 or 2     Binge frequency: Never     Comment: Socially   • Drug use: No   • Sexual activity: Yes     Partners: Male   Other Topics Concern   • Not on file   Social History Narrative   • Not on file         Vitals:    02/25/19 0812   BP: 136/86   BP Location: Right arm   Pulse: 75   SpO2: 99%   Weight: 87.8 kg (193 lb 9.6 oz)   Height: 157.5 cm (62.01\")          Physical Exam:    General: Alert and oriented x 3.  No acute distress.  Normal affect.  HEENT: Pupils equal, round, reactive to light; extraocular movements intact; sclerae nonicteric; pharynx, ear canals and TMs normal.  Neck: Without JVD, thyromegaly, bruit, or adenopathy.  Lungs: Clear to auscultation in all fields.  Heart: Regular rate and rhythm without murmur, rub, gallop, or click.  Abdomen: Soft, nontender, without hepatosplenomegaly or hernia.  Bowel sounds normal.  : Deferred.  Rectal: Deferred.  Extremities: Without clubbing, cyanosis, edema, or pulse deficit.  Neurologic: " Intact without focal deficit.  Normal station and gait observed during ingress and egress from the examination room.  Skin: Without significant lesion.  Musculoskeletal: Unremarkable.    Lab/other results:    I reviewed her carotid Doppler study from last year which revealed 16-49% bilateral stenosis.    NMR reveals a total cholesterol of 173.  Triglycerides 131.  LDL particle number elevated at 1527.  Small LDL particle number elevated at 744.  HDL particle number is low at 26.4.  CMP normal except creatinine slightly elevated at 1.02.  Urinalysis normal.  CBC normal.  Thyroid function tests normal.  Vitamin D normal at 85.3.  Hepatitis C antibody screen is negative.    Assessment/Plan:     Diagnosis Plan   1. Routine physical examination     2. Benign essential hypertension     3. Carotid artery plaque, 10/29/2013--right ICA plaque.  Normal left ICA.     4. Hyperlipidemia, unspecified hyperlipidemia type     5. Migraine headaches     6. Vitamin D deficiency     7. Depression with anxiety     8. Anxiety disorder due to general medical condition     9. Therapeutic drug monitoring     10. Breast cancer screening by mammogram  Mammo Screening Bilateral With CAD       Patient presents with essentially normal annual physical except for the following issues: Her blood pressure is not quite at goal but not enough to make any changes at the present time.  She is taking one half of Benicar HCT 40/25 daily.  She technically has dyslipidemia and it appears her carotid artery plaque is progressing.  Therefore, we should initiate statin therapy.  Her migraine headaches seem to be controlled on propranolol short-acting but she is only needing 1 pill/day.  Vitamin D is therapeutic.  Anxiety being treated with Xanax.    Plan is as follows: Start simvastatin 10 mg/day.  No other changes in medical regimen.  Mammogram ordered.  I will have patient follow-up with lab prior about 6 weeks to assess her cholesterol and we can reassess  her blood pressure at that time.      Procedures

## 2019-02-27 DIAGNOSIS — I10 BENIGN ESSENTIAL HYPERTENSION: Chronic | ICD-10-CM

## 2019-02-27 RX ORDER — OLMESARTAN MEDOXOMIL AND HYDROCHLOROTHIAZIDE 40/25 40; 25 MG/1; MG/1
1 TABLET ORAL DAILY
Qty: 30 TABLET | Refills: 1 | Status: SHIPPED | OUTPATIENT
Start: 2019-02-27 | End: 2019-07-12 | Stop reason: SDUPTHER

## 2019-03-12 RX ORDER — PROPRANOLOL HYDROCHLORIDE 20 MG/1
20 TABLET ORAL 3 TIMES DAILY
Qty: 270 TABLET | Refills: 1 | Status: SHIPPED | OUTPATIENT
Start: 2019-03-12 | End: 2019-07-25 | Stop reason: SDUPTHER

## 2019-03-12 RX ORDER — ALPRAZOLAM 0.25 MG/1
0.25 TABLET ORAL 2 TIMES DAILY
Qty: 60 TABLET | Refills: 2 | OUTPATIENT
Start: 2019-03-12 | End: 2019-07-11

## 2019-04-01 DIAGNOSIS — E78.5 HYPERLIPIDEMIA, UNSPECIFIED HYPERLIPIDEMIA TYPE: Chronic | ICD-10-CM

## 2019-04-03 LAB
ALBUMIN SERPL-MCNC: 4.7 G/DL (ref 3.5–5.2)
ALBUMIN/GLOB SERPL: 1.8 G/DL
ALP SERPL-CCNC: 89 U/L (ref 39–117)
ALT SERPL-CCNC: 29 U/L (ref 1–33)
AST SERPL-CCNC: 24 U/L (ref 1–32)
BILIRUB SERPL-MCNC: 0.5 MG/DL (ref 0.2–1.2)
BUN SERPL-MCNC: 14 MG/DL (ref 6–20)
BUN/CREAT SERPL: 13.7 (ref 7–25)
CALCIUM SERPL-MCNC: 10.3 MG/DL (ref 8.6–10.5)
CHLORIDE SERPL-SCNC: 102 MMOL/L (ref 98–107)
CHOLEST SERPL-MCNC: 181 MG/DL (ref 100–199)
CK SERPL-CCNC: 355 U/L (ref 20–180)
CO2 SERPL-SCNC: 29.8 MMOL/L (ref 22–29)
CREAT SERPL-MCNC: 1.02 MG/DL (ref 0.57–1)
GLOBULIN SER CALC-MCNC: 2.6 GM/DL
GLUCOSE SERPL-MCNC: 113 MG/DL (ref 65–99)
HDL SERPL-SCNC: 25 UMOL/L
HDLC SERPL-MCNC: 38 MG/DL
LDL SERPL QN: 21.4 NM
LDL SERPL-SCNC: 1378 NMOL/L
LDL SMALL SERPL-SCNC: 349 NMOL/L
LDLC SERPL CALC-MCNC: 120 MG/DL (ref 0–99)
POTASSIUM SERPL-SCNC: 4.2 MMOL/L (ref 3.5–5.2)
PROT SERPL-MCNC: 7.3 G/DL (ref 6–8.5)
SODIUM SERPL-SCNC: 143 MMOL/L (ref 136–145)
TRIGL SERPL-MCNC: 114 MG/DL (ref 0–149)

## 2019-04-29 ENCOUNTER — OFFICE VISIT (OUTPATIENT)
Dept: INTERNAL MEDICINE | Facility: CLINIC | Age: 54
End: 2019-04-29

## 2019-04-29 VITALS
SYSTOLIC BLOOD PRESSURE: 124 MMHG | HEIGHT: 62 IN | HEART RATE: 75 BPM | WEIGHT: 193.2 LBS | BODY MASS INDEX: 35.55 KG/M2 | DIASTOLIC BLOOD PRESSURE: 82 MMHG | OXYGEN SATURATION: 98 %

## 2019-04-29 DIAGNOSIS — E78.5 HYPERLIPIDEMIA, UNSPECIFIED HYPERLIPIDEMIA TYPE: Primary | Chronic | ICD-10-CM

## 2019-04-29 DIAGNOSIS — Z01.419 ENCOUNTER FOR WELL WOMAN EXAM WITH ROUTINE GYNECOLOGICAL EXAM: ICD-10-CM

## 2019-04-29 DIAGNOSIS — Z12.39 BREAST CANCER SCREENING: ICD-10-CM

## 2019-04-29 DIAGNOSIS — R73.01 IMPAIRED FASTING GLUCOSE: ICD-10-CM

## 2019-04-29 DIAGNOSIS — Z91.199 NONCOMPLIANCE: ICD-10-CM

## 2019-04-29 DIAGNOSIS — I65.21 ATHEROSCLEROSIS OF RIGHT CAROTID ARTERY: Chronic | ICD-10-CM

## 2019-04-29 DIAGNOSIS — I10 BENIGN ESSENTIAL HYPERTENSION: Chronic | ICD-10-CM

## 2019-04-29 PROBLEM — E66.01 MORBID OBESITY: Chronic | Status: ACTIVE | Noted: 2019-02-25

## 2019-04-29 PROCEDURE — 99214 OFFICE O/P EST MOD 30 MIN: CPT | Performed by: INTERNAL MEDICINE

## 2019-04-29 RX ORDER — SIMVASTATIN 10 MG
TABLET ORAL
Qty: 30 TABLET | Refills: 6 | Status: SHIPPED | OUTPATIENT
Start: 2019-04-29 | End: 2019-11-11

## 2019-04-29 NOTE — PROGRESS NOTES
04/29/2019    Patient Information  Ruba Ohara                                                                                          [address] [facility] [address]      1965  [unfilled]  [phone] (work)    Chief Complaint:     Follow-up hyperlipidemia, hypertension, recently elevated blood pressure, carotid artery plaque and concern for increased risk cardiovascular and cerebrovascular disease.    History of Present Illness:    Patient with a history of medical problems as outlined in chief complaint presents today for follow-up with lab prior.  I evaluated her about 6 weeks ago at which time her blood pressure was mildly elevated.  I did not make any changes at that time but the plans were to reassess today.  Given her known carotid artery plaque and concern for increased risk of cardiovascular disease, we started her on simvastatin 40 mg/day.  This was sent to the [facility] pharmacy but patient reports she did not receive it.  We will investigate that.  At any rate, she has not been taking simvastatin.  We will reassess her cholesterol today.  Past medical history reviewed and updated were necessary including health maintenance parameters.  This reveals she is up-to-date with the exception of a mammogram which ordered at the last visit but this was never done as well.    Review of Systems   Constitution: Negative.   HENT: Negative.    Eyes: Negative.    Cardiovascular: Negative.    Respiratory: Negative.    Endocrine: Negative.    Hematologic/Lymphatic: Negative.    Skin: Negative.    Musculoskeletal: Negative.    Gastrointestinal: Negative.    Genitourinary: Negative.    Neurological: Negative.    Psychiatric/Behavioral: Negative.    Allergic/Immunologic: Negative.        Active Problems:    Patient Active Problem List   Diagnosis   • Anxiety disorder due to general medical condition   • Benign essential hypertension   • Carotid artery plaque, 02/01/2018--16-49%  bilateral.  10/29/2013--right ICA plaque.  Normal left ICA.   • Hirsutism   • Hyperlipidemia   • Migraine headaches   • Vitamin D deficiency   • Therapeutic drug monitoring   • Routine physical examination   • Depression with anxiety   • Morbid obesity (CMS/HCC)   • Impaired fasting glucose   • Noncompliance         Past Medical History:   Diagnosis Date   • Anxiety disorder due to general medical condition 4/14/2016   • Benign essential hypertension 4/12/2006 04/12/2006--treatment for hypertension begun.   • Carotid artery plaque, 02/01/2018--16-49% bilateral.  10/29/2013--right ICA plaque.  Normal left ICA. 10/29/2013    February 1, 2018--carotid Doppler study revealed 16-49% bilateral carotid artery plaque.  10/29/2013--carotid Doppler revealed plaque without stenosis in the right internal carotid artery. There was no evidence of hemodynamically significant stenosis in the left carotid system. Antegrade flow was present in the left vertebral artery.   • Depression with anxiety 4/26/2016   • Hirsutism 4/14/2016   • History of iron deficiency anemia 07/21/2015 07/21/2015--patient was admitted briefly to the hospital with a hemoglobin of 7.3. This was believed to be due to an episode of rectal bleeding, but primarily due to menorrhagia.   • History of meningioma 08/19/2014 08/19/2014--MRI again performed for left sided facial and arm numbness and tingling.  This reveals status post resection of a meningioma via a left temporoparietal craniotomy.  There is no evidence to suggest residual or recurrent tumor.  Again noted is a focus of encephalomalacia within the anterior portion of the left temporal lobe deep to the craniotomy flap.  10/24/2013--MRI of the brain perfo   • History of Seizure disorder, grand mal 4/14/2016    This was related to a benign brain tumor of the left temporal lobe that is believed to be a meningioma. Status post resection and no recurrence of the seizures.   • Hyperlipidemia 2/7/2012     02/07/2012--treatment for hyperlipidemia begun.   • Migraine headaches 10/22/2013    08/19/2014--MRI again performed for left sided facial and arm numbness and tingling.  This reveals status post resection of a meningioma via a left temporoparietal craniotomy.  There is no evidence to suggest residual or recurrent tumor.  Again noted is a focus of encephalomalacia within the anterior portion of the left temporal lobe deep to the craniotomy flap.  10/29/2013--echocardiogram performed for possible TIA symptoms revealed normal left ventricular size thickness and function. There were no significant valvular abnormalities.  10/24/2013--MRI performed for possible TIA. Prior large 10 cm lateral left temporoparietal craniotomy and an additional 3.5 cm mid to inferior left  temporal craniectomy  by history resection of a meningioma. There is encephalomalacia in anterior inferior left temporal lobe which tracks approximately 3 x 2.1 cm. The remainder of the brain parenchyma is normal in signal intensity. Ventricles are normal. No mass effect noted. No midline shift and no extra-axial fluid collecti   • Vitamin D deficiency 4/14/2016         Past Surgical History:   Procedure Laterality Date   • BRAIN MENINGIOMA EXCISION  26 years old    26 years of age--status post resection of benign brain tumor believed to be a meningioma. Located in the anterior aspect of the left temporal lobe. 10/24/2013--MRI of the brain performed for possible TIA revealed surgical changes of a lateral left temporal parietal craniotomy as well as mid to inferior squamosal left temporal craniotomy consistent with patient's previous surgery. There was a 3.5 x   • COLONOSCOPY  08/20/2015 08/20/2015--entirely normal screening colonoscopy.   • PARTIAL HYSTERECTOMY  2007 2007--partial vaginal hysterectomy.   • TUBAL ABDOMINAL LIGATION  28 years old    28 years old--tubal ligation.         No Known Allergies        Current Outpatient Medications:   •   ALPRAZolam (XANAX) 0.25 MG tablet, Take 1 tablet by mouth 2 (Two) Times a Day., Disp: 60 tablet, Rfl: 2  •  aspirin  MG EC tablet, Take  by mouth daily., Disp: , Rfl:   •  Cholecalciferol (VITAMIN D3) 5000 UNITS capsule capsule, 1 by mouth daily as directed, Disp: 30 capsule, Rfl: 11  •  naproxen sodium (ANAPROX) 550 MG tablet, Take 1 tablet by mouth 2 (Two) Times a Day With Meals., Disp: 20 tablet, Rfl: 0  •  olmesartan-hydrochlorothiazide (BENICAR HCT) 40-25 MG per tablet, Take 1 tablet by mouth Daily for blood pressure., Disp: 30 tablet, Rfl: 1  •  propranolol (INDERAL) 20 MG tablet, Take 1 tablet by mouth 3 (Three) Times a Day., Disp: 270 tablet, Rfl: 1  •  simvastatin (ZOCOR) 10 MG tablet, Take 1 tablet by mouth daily for high cholesterol, Disp: 30 tablet, Rfl: 6      Family History   Problem Relation Age of Onset   • Throat cancer Father    • Stroke Father    • Hypertension Other         Benign Essential   • Stroke Brother          Social History     Socioeconomic History   • Marital status: Legally      Spouse name: Not on file   • Number of children: Not on file   • Years of education: Not on file   • Highest education level: Not on file   Occupational History   • Occupation:  for [employer]   Social Needs   • Financial resource strain: Not hard at all   • Food insecurity:     Worry: Never true     Inability: Never true   • Transportation needs:     Medical: No     Non-medical: No   Tobacco Use   • Smoking status: Never Smoker   • Smokeless tobacco: Never Used   Substance and Sexual Activity   • Alcohol use: Yes     Frequency: 2-4 times a month     Drinks per session: 1 or 2     Binge frequency: Never     Comment: Socially   • Drug use: No   • Sexual activity: Yes     Partners: Male   Lifestyle   • Physical activity:     Days per week: 3 days     Minutes per session: 60 min   • Stress: Rather much   Relationships   • Social connections:     Talks on phone: Patient refused      "Gets together: Patient refused     Attends Jewish service: Patient refused     Active member of club or organization: Patient refused     Attends meetings of clubs or organizations: Patient refused     Relationship status: Patient refused         Vitals:    04/29/19 0811   BP: 124/82   BP Location: Right arm   Pulse: 75   SpO2: 98%   Weight: 87.6 kg (193 lb 3.2 oz)   Height: 157.5 cm (62.01\")          Physical Exam:    General: Alert and oriented x 3.  No acute distress.  Normal affect. Obese.   HEENT: Pupils equal, round, reactive to light; extraocular movements intact; sclerae nonicteric; pharynx, ear canals and TMs normal.  Neck: Without JVD, thyromegaly, bruit, or adenopathy.  Lungs: Clear to auscultation in all fields.  Heart: Regular rate and rhythm without murmur, rub, gallop, or click.  Abdomen: Soft, nontender, without hepatosplenomegaly or hernia.  Bowel sounds normal.  : Deferred.  Rectal: Deferred.  Extremities: Without clubbing, cyanosis, edema, or pulse deficit.  Neurologic: Intact without focal deficit.  Normal station and gait observed during ingress and egress from the examination room.  Skin: Without significant lesion.  Musculoskeletal: Unremarkable.      Lab/other results:    NMR reveals a total cholesterol 181.  Triglycerides 114.  LDL particle number elevated 1378.  Small LDL particle numbers normal at 349.  HDL particle number is low at 25.0.  CMP normal except blood sugar elevated 113, creatinine slightly elevated at 1.02.  CPK mildly elevated at 355.    Assessment/Plan:     Diagnosis Plan   1. Hyperlipidemia, unspecified hyperlipidemia type  simvastatin (ZOCOR) 10 MG tablet    Comprehensive Metabolic Panel    CK    NMR LipoProfile   2. Benign essential hypertension     3. Carotid artery plaque, 02/01/2018--16-49% bilateral.  10/29/2013--right ICA plaque.  Normal left ICA.     4. Impaired fasting glucose  Hemoglobin A1c   5. Breast cancer screening  Mammo Screening Bilateral With CAD "   6. Encounter for well woman exam with routine gynecological exam  Ambulatory Referral to Gynecology   7. Noncompliance       Patient presents with hyperlipidemia which is technically dyslipidemia but she has significantly increased risk for cardiovascular disease.  She does have known carotid artery plaque and is only 53 years of age.  I explained to her that I am concerned that she may have a stroke or heart attack at some point unless we reduce her risk factors.  Even then, there is no guarantee that that will happen.  Her blood pressure seems to be adequate at the present time.  It appears she may have a new risk factor of impaired fasting glucose.  Patient reports she was fasting when she had the blood work although given the history of noncompliance, this is questionable.  I ordered a mammogram at the last visit and patient reports that she missed that call and never received a phone call back.  I explained the patient that she needs to take responsibility for her own health and should have contacted scheduling regarding this.  The same is true regarding not receiving the simvastatin at her pharmacy.  We contacted the pharmacy and there was an order on file but this was canceled and I suspect because patient did not go to pick it up.  Patient states when she picked up the rest of her prescriptions the simvastatin was not with the other prescriptions but yet she did not contact us or question her pharmacy regarding this matter.  It appears she may have impaired fasting glucose.  Patient states she was fasting when she had this blood work done.  However, I do question this, given her recent noncompliance.  I once again explained to patient she needs to take responsibility for her own health.    Plan is as follows: Simvastatin once again reordered.  Mammogram reordered.  No change in blood pressure medication.  GYN referral.  Patient will obtain fasting lab work in about 6 weeks and she can follow-up on the  phone for the results and possible further instructions.  Any further noncompliance will be grounds for dismissal from the practice.        Procedures

## 2019-04-30 ENCOUNTER — TELEPHONE (OUTPATIENT)
Dept: OBSTETRICS AND GYNECOLOGY | Age: 54
End: 2019-04-30

## 2019-06-13 ENCOUNTER — HOSPITAL ENCOUNTER (OUTPATIENT)
Dept: MAMMOGRAPHY | Facility: HOSPITAL | Age: 54
Discharge: HOME | End: 2019-06-13
Admitting: INTERNAL MEDICINE

## 2019-06-13 ENCOUNTER — OFFICE VISIT (OUTPATIENT)
Dept: OBSTETRICS AND GYNECOLOGY | Age: 54
End: 2019-06-13

## 2019-06-13 VITALS
WEIGHT: 192 LBS | HEIGHT: 62 IN | BODY MASS INDEX: 35.33 KG/M2 | DIASTOLIC BLOOD PRESSURE: 88 MMHG | SYSTOLIC BLOOD PRESSURE: 134 MMHG

## 2019-06-13 DIAGNOSIS — F52.0 HYPOACTIVE SEXUAL DESIRE DISORDER: Primary | ICD-10-CM

## 2019-06-13 DIAGNOSIS — Z12.4 SCREENING FOR MALIGNANT NEOPLASM OF CERVIX: ICD-10-CM

## 2019-06-13 PROCEDURE — 99386 PREV VISIT NEW AGE 40-64: CPT | Performed by: OBSTETRICS & GYNECOLOGY

## 2019-06-13 PROCEDURE — 77067 SCR MAMMO BI INCL CAD: CPT

## 2019-06-13 NOTE — PROGRESS NOTES
Routine Annual Visit    2019    Patient: Ruba Ohara          MR#:[id number]      Chief Complaint   Patient presents with   • Gynecologic Exam     annual. last pap 10-12yrs ago per pt. MG today at [facility]        History of Present Illness    53 y.o. female  who presents for annual exam.     Started going through a divorce about a yr ago, 6 mo prior to that started having decreased desire and difficulty with orgasm  Hot flashes around time of hyst, resolved, none further    Recently started having loss of urine, daily, very small amount with cough/laugh    Has some dryness with intercourse as well    Health Maintenance  Last pap prior to hyst, denies abnl  Mammogram this morning  Colonoscopy had colonoscopy , normal  PCP Dr. Pike    No LMP recorded. Patient has had a hysterectomy.  Obstetric History:  OB History      Para Term  AB Living    2 2 2     2    SAB TAB Ectopic Molar Multiple Live Births              2         Menstrual History:     No LMP recorded. Patient has had a hysterectomy.       Sexual History:   active with male partner    ________________________________________  Patient Active Problem List   Diagnosis   • Anxiety disorder due to general medical condition   • Benign essential hypertension   • Carotid artery plaque, 2018--16-49% bilateral.  10/29/2013--right ICA plaque.  Normal left ICA.   • Hirsutism   • Hyperlipidemia   • Migraine headaches   • Vitamin D deficiency   • Therapeutic drug monitoring   • Routine physical examination   • Depression with anxiety   • Morbid obesity (CMS/[facility])   • Impaired fasting glucose   • Noncompliance       Past Medical History:   Diagnosis Date   • Anxiety disorder due to general medical condition 2016   • Benign essential hypertension 2006--treatment for hypertension begun.   • Carotid artery plaque, 2018--16-49% bilateral.  10/29/2013--right ICA plaque.  Normal left ICA. 10/29/2013     February 1, 2018--carotid Doppler study revealed 16-49% bilateral carotid artery plaque.  10/29/2013--carotid Doppler revealed plaque without stenosis in the right internal carotid artery. There was no evidence of hemodynamically significant stenosis in the left carotid system. Antegrade flow was present in the left vertebral artery.   • Depression with anxiety 4/26/2016   • Hirsutism 4/14/2016   • History of iron deficiency anemia 07/21/2015 07/21/2015--patient was admitted briefly to the hospital with a hemoglobin of 7.3. This was believed to be due to an episode of rectal bleeding, but primarily due to menorrhagia.   • History of meningioma 08/19/2014 08/19/2014--MRI again performed for left sided facial and arm numbness and tingling.  This reveals status post resection of a meningioma via a left temporoparietal craniotomy.  There is no evidence to suggest residual or recurrent tumor.  Again noted is a focus of encephalomalacia within the anterior portion of the left temporal lobe deep to the craniotomy flap.  10/24/2013--MRI of the brain perfo   • History of Seizure disorder, grand mal 4/14/2016    This was related to a benign brain tumor of the left temporal lobe that is believed to be a meningioma. Status post resection and no recurrence of the seizures.   • Hyperlipidemia 2/7/2012 02/07/2012--treatment for hyperlipidemia begun.   • Migraine headaches 10/22/2013    08/19/2014--MRI again performed for left sided facial and arm numbness and tingling.  This reveals status post resection of a meningioma via a left temporoparietal craniotomy.  There is no evidence to suggest residual or recurrent tumor.  Again noted is a focus of encephalomalacia within the anterior portion of the left temporal lobe deep to the craniotomy flap.  10/29/2013--echocardiogram performed for possible TIA symptoms revealed normal left ventricular size thickness and function. There were no significant valvular abnormalities.   10/24/2013--MRI performed for possible TIA. Prior large 10 cm lateral left temporoparietal craniotomy and an additional 3.5 cm mid to inferior left  temporal craniectomy  by history resection of a meningioma. There is encephalomalacia in anterior inferior left temporal lobe which tracks approximately 3 x 2.1 cm. The remainder of the brain parenchyma is normal in signal intensity. Ventricles are normal. No mass effect noted. No midline shift and no extra-axial fluid collecti   • Vitamin D deficiency 4/14/2016       Family History   Problem Relation Age of Onset   • Throat cancer Father    • Stroke Father    • Hypertension Other         Benign Essential   • Stroke Brother    • Breast cancer Neg Hx    • Ovarian cancer Neg Hx    • Uterine cancer Neg Hx    • Colon cancer Neg Hx    • Pancreatic cancer Neg Hx        Past Surgical History:   Procedure Laterality Date   • BRAIN MENINGIOMA EXCISION  26 years old    26 years of age--status post resection of benign brain tumor believed to be a meningioma. Located in the anterior aspect of the left temporal lobe. 10/24/2013--MRI of the brain performed for possible TIA revealed surgical changes of a lateral left temporal parietal craniotomy as well as mid to inferior squamosal left temporal craniotomy consistent with patient's previous surgery. There was a 3.5 x   • COLONOSCOPY  08/20/2015 08/20/2015--entirely normal screening colonoscopy.   • LAPAROSCOPIC HYSTERECTOMY  2010 or so    due to abnormal bleeding due to fibroids, done at [facility], unable to recall physician   • TUBAL ABDOMINAL LIGATION  28 years old    28 years old--tubal ligation.       Social History     Tobacco Use   Smoking Status Never Smoker   Smokeless Tobacco Never Used       has a current medication list which includes the following prescription(s): alprazolam, aspirin ec, vitamin d3, naproxen sodium, olmesartan-hydrochlorothiazide, propranolol, and  "simvastatin.  ________________________________________    Current contraception: status post hysterectomy  History of abnormal Pap smear: no  Family history of Breast, ovarian, uterine, colon, pancreatic cancer: no  History of abnormal mammogram: no    The following portions of the patient's history were reviewed and updated as appropriate: allergies, current medications, past family history, past medical history, past social history, past surgical history and problem list.    Review of Systems: comprehensive ROS negative except for sexual dysfunction, loss of urine    Objective   Physical Exam    /88   Ht 157.5 cm (62\")   Wt 87.1 kg (192 lb)   Breastfeeding? No   BMI 35.12 kg/m²    BP Readings from Last 3 Encounters:   06/13/19 134/88   04/29/19 124/82   02/25/19 136/86      Wt Readings from Last 3 Encounters:   06/13/19 87.1 kg (192 lb)   04/29/19 87.6 kg (193 lb 3.2 oz)   02/25/19 87.8 kg (193 lb 9.6 oz)         BMI: Body mass index is 35.12 kg/m².       General:   alert, appears stated age and cooperative   Heart:: regular rate and rhythm, S1, S2 normal, no murmur, click, rub or gallop   Lungs: normal respiratory effort and auscultation   Abdomen: soft, non-tender, without masses or organomegaly   Breast: inspection negative, no nipple discharge or bleeding, no masses or nodularity palpable   Urethra and bladder: urethral meatus normal; bladder nontender to palpation;   Vulva: normal, Bartholin's, Urethra, Denver's normal   Vagina: normal mucosa, normal discharge, some atrophy noted   Cervix: absent   Uterus: absent    Adnexa: normal adnexa and no mass, fullness, tenderness       Assessment:    normal annual exam     Plan:    Plan     []  Mammogram request made- completed today  [x]  PAP done- do not have prior pap records, has hx of hyst and discussed that if this one is normal wouldn't perform more, would still need yearly or biyearly well woman exam  []  Labs:   []  GC/Chl/TV  []  DEXA scan   []  " Referral for colonoscopy:     Problems Addressed this Visit        Other    Hypoactive sexual desire disorder - Primary      Other Visit Diagnoses     Screening for malignant neoplasm of cervix        Relevant Orders    IGP, Apt HPV,rfx 16 / 18,45        To return for visit regarding hypoactive sexual desire and possible orgasm d/o    Counseling  [x]  Nutrition  [x]  Physical activity/regular exercise   [x]  Healthy weight  []  Injury prevention  []  Smoking cessation  []  Substance misuse/abuse  [x]  Sexual behavior  []  STD prevention  []  Contraception  []  Dental health  []  Mental health  []  Immunization  []  Encouraged SBE      Patient's BMI is Body mass index is 35.12 kg/m²., which is classified as obese. We discussed health consequences of obesity and recommended weight loss. I counseled regarding diet modifications and exercise in order to reach weight loss goal.     Twila Dunaway MD  06/13/2019  10:19 AM

## 2019-06-17 LAB
CYTOLOGIST CVX/VAG CYTO: NORMAL
CYTOLOGY CVX/VAG DOC CYTO: NORMAL
CYTOLOGY CVX/VAG DOC THIN PREP: NORMAL
DX ICD CODE: NORMAL
HIV 1 & 2 AB SER-IMP: NORMAL
HPV I/H RISK 4 DNA CVX QL PROBE+SIG AMP: NEGATIVE
OTHER STN SPEC: NORMAL
STAT OF ADQ CVX/VAG CYTO-IMP: NORMAL

## 2019-06-18 ENCOUNTER — TELEPHONE (OUTPATIENT)
Dept: OBSTETRICS AND GYNECOLOGY | Age: 54
End: 2019-06-18

## 2019-06-18 NOTE — TELEPHONE ENCOUNTER
----- Message from Twila Dunaway MD sent at 6/17/2019  8:26 PM EDT -----  Please let her know that her pap is normal! Thanks.    ----- Message -----  From: Sue, Reflab Results In  Sent: 6/17/2019  12:35 PM  To: Twila Dunaway MD

## 2019-07-01 DIAGNOSIS — I10 BENIGN ESSENTIAL HYPERTENSION: Chronic | ICD-10-CM

## 2019-07-01 RX ORDER — OLMESARTAN MEDOXOMIL AND HYDROCHLOROTHIAZIDE 40/25 40; 25 MG/1; MG/1
1 TABLET ORAL DAILY
Qty: 30 TABLET | Refills: 1 | OUTPATIENT
Start: 2019-07-01

## 2019-07-09 ENCOUNTER — OFFICE VISIT (OUTPATIENT)
Dept: INTERNAL MEDICINE | Facility: CLINIC | Age: 54
End: 2019-07-09

## 2019-07-09 VITALS
SYSTOLIC BLOOD PRESSURE: 118 MMHG | WEIGHT: 192.4 LBS | BODY MASS INDEX: 35.41 KG/M2 | OXYGEN SATURATION: 99 % | TEMPERATURE: 98.3 F | HEIGHT: 62 IN | HEART RATE: 64 BPM | DIASTOLIC BLOOD PRESSURE: 78 MMHG

## 2019-07-09 DIAGNOSIS — H81.13 BENIGN PAROXYSMAL POSITIONAL VERTIGO DUE TO BILATERAL VESTIBULAR DISORDER: ICD-10-CM

## 2019-07-09 DIAGNOSIS — R11.14 BILIOUS VOMITING WITH NAUSEA: ICD-10-CM

## 2019-07-09 DIAGNOSIS — G44.52 NEW DAILY PERSISTENT HEADACHE: Primary | ICD-10-CM

## 2019-07-09 DIAGNOSIS — R42 DIZZINESS: ICD-10-CM

## 2019-07-09 DIAGNOSIS — Z86.018 HISTORY OF MENINGIOMA: ICD-10-CM

## 2019-07-09 PROBLEM — Z91.199 NONCOMPLIANCE: Chronic | Status: ACTIVE | Noted: 2019-04-29

## 2019-07-09 PROBLEM — R73.01 IMPAIRED FASTING GLUCOSE: Chronic | Status: ACTIVE | Noted: 2019-04-29

## 2019-07-09 PROCEDURE — 99214 OFFICE O/P EST MOD 30 MIN: CPT | Performed by: INTERNAL MEDICINE

## 2019-07-09 NOTE — PROGRESS NOTES
07/09/2019    Patient Information  Ruba Ohara                                                                                          [address] [facility] [address]      1965  [unfilled]  [phone] (work)    Chief Complaint:     Complaining of persistent dizziness, persistent headache, nausea, vomiting.    History of Present Illness:    Patient with previous history of meningioma resection and history of previous seizure disorder related to this presents today with approximately 3-day history of illness associated with a headache, dizziness, and nausea and vomiting.  The history regarding this will be described below.  Past medical history reviewed and updated were necessary including health maintenance parameters.  This reveals she is up-to-date or else accounted for.    The history regarding dizziness, headache, nausea, vomiting:    July 9, 2019--patient presents with approximately 3-day history of dizziness which she describes as a spinning off-balance sensation that comes on when she stands upright.  The episodes can last for several hours and is associated with nausea and occasionally emesis for 4 or 5 occasions.  She is also complaining of a bioccipital headache for this entire duration that is described as a throbbing/pounding sensation.  No visual changes.  No numbness or paresthesias or isolated muscular weakness.  No fever, chills, or other systemic signs or symptoms.  Patient reports she did have slight diarrhea occasionally.  She has mild associated fatigue.  On exam I see no focal neurologic deficit.  I could not elicit any nystagmus but having patient follow my finger with her eyes seem to bother her somewhat.  I will refer patient for vestibular testing and possible treatment but I will also schedule an MRI of the brain, particularly given her history of meningioma and previous seizure disorder.    Review of Systems   Constitution: Negative for chills,  diaphoresis, fever and night sweats.   Eyes: Negative for blurred vision, discharge, double vision, pain, photophobia, redness, vision loss in left eye, vision loss in right eye, visual disturbance and visual halos.   Cardiovascular: Negative for chest pain.   Gastrointestinal: Positive for diarrhea, nausea and vomiting.   Neurological: Positive for disturbances in coordination, excessive daytime sleepiness, dizziness, headaches, loss of balance and vertigo. Negative for aphonia, brief paralysis, difficulty with concentration, focal weakness, light-headedness, numbness, paresthesias, seizures, sensory change and tremors.       Active Problems:    Patient Active Problem List   Diagnosis   • Anxiety disorder due to general medical condition   • Benign essential hypertension   • Carotid artery plaque, 02/01/2018--16-49% bilateral.  10/29/2013--right ICA plaque.  Normal left ICA.   • Hirsutism   • Hyperlipidemia   • Migraine headaches   • Vitamin D deficiency   • Therapeutic drug monitoring   • Routine physical examination   • History of meningioma   • Depression with anxiety   • Morbid obesity (CMS/HCC)   • Impaired fasting glucose   • Noncompliance   • Hypoactive sexual desire disorder   • Benign paroxysmal positional vertigo due to bilateral vestibular disorder   • New daily persistent headache   • Dizziness   • Bilious vomiting with nausea         Past Medical History:   Diagnosis Date   • Anxiety disorder due to general medical condition 4/14/2016   • Benign essential hypertension 4/12/2006 04/12/2006--treatment for hypertension begun.   • Carotid artery plaque, 02/01/2018--16-49% bilateral.  10/29/2013--right ICA plaque.  Normal left ICA. 10/29/2013    February 1, 2018--carotid Doppler study revealed 16-49% bilateral carotid artery plaque.  10/29/2013--carotid Doppler revealed plaque without stenosis in the right internal carotid artery. There was no evidence of hemodynamically significant stenosis in the left  carotid system. Antegrade flow was present in the left vertebral artery.   • Depression with anxiety 4/26/2016   • Hirsutism 4/14/2016   • History of iron deficiency anemia 07/21/2015 07/21/2015--patient was admitted briefly to the hospital with a hemoglobin of 7.3. This was believed to be due to an episode of rectal bleeding, but primarily due to menorrhagia.   • History of meningioma 08/19/2014 08/19/2014--MRI again performed for left sided facial and arm numbness and tingling.  This reveals status post resection of a meningioma via a left temporoparietal craniotomy.  There is no evidence to suggest residual or recurrent tumor.  Again noted is a focus of encephalomalacia within the anterior portion of the left temporal lobe deep to the craniotomy flap.  10/24/2013--MRI of the brain perfo   • History of Seizure disorder, grand mal 4/14/2016    This was related to a benign brain tumor of the left temporal lobe that is believed to be a meningioma. Status post resection and no recurrence of the seizures.   • Hyperlipidemia 2/7/2012 02/07/2012--treatment for hyperlipidemia begun.   • Impaired fasting glucose 4/29/2019 April 29, 2019--routine follow-up.  Fasting serum glucose elevated at 113.  Recommend decreased carbohydrate diet, exercise, weight loss.   • Migraine headaches 10/22/2013    08/19/2014--MRI again performed for left sided facial and arm numbness and tingling.  This reveals status post resection of a meningioma via a left temporoparietal craniotomy.  There is no evidence to suggest residual or recurrent tumor.  Again noted is a focus of encephalomalacia within the anterior portion of the left temporal lobe deep to the craniotomy flap.  10/29/2013--echocardiogram performed for possible TIA symptoms revealed normal left ventricular size thickness and function. There were no significant valvular abnormalities.  10/24/2013--MRI performed for possible TIA. Prior large 10 cm lateral left  temporoparietal craniotomy and an additional 3.5 cm mid to inferior left  temporal craniectomy  by history resection of a meningioma. There is encephalomalacia in anterior inferior left temporal lobe which tracks approximately 3 x 2.1 cm. The remainder of the brain parenchyma is normal in signal intensity. Ventricles are normal. No mass effect noted. No midline shift and no extra-axial fluid collecti   • Noncompliance 4/29/2019 April 29, 2019--this patient has been noncompliant with filling medications, obtaining diagnostic studies such as routine mammogram.   • Vitamin D deficiency 4/14/2016         Past Surgical History:   Procedure Laterality Date   • BRAIN MENINGIOMA EXCISION  26 years old    26 years of age--status post resection of benign brain tumor believed to be a meningioma. Located in the anterior aspect of the left temporal lobe. 10/24/2013--MRI of the brain performed for possible TIA revealed surgical changes of a lateral left temporal parietal craniotomy as well as mid to inferior squamosal left temporal craniotomy consistent with patient's previous surgery. There was a 3.5 x   • COLONOSCOPY  08/20/2015 08/20/2015--entirely normal screening colonoscopy.   • LAPAROSCOPIC HYSTERECTOMY  2010 or so    due to abnormal bleeding due to fibroids, done at [facility], unable to recall physician   • TUBAL ABDOMINAL LIGATION  28 years old    28 years old--tubal ligation.         No Known Allergies        Current Outpatient Medications:   •  ALPRAZolam (XANAX) 0.25 MG tablet, Take 1 tablet by mouth 2 (Two) Times a Day., Disp: 60 tablet, Rfl: 2  •  aspirin  MG EC tablet, Take  by mouth daily., Disp: , Rfl:   •  Cholecalciferol (VITAMIN D3) 5000 UNITS capsule capsule, 1 by mouth daily as directed, Disp: 30 capsule, Rfl: 11  •  naproxen sodium (ANAPROX) 550 MG tablet, Take 1 tablet by mouth 2 (Two) Times a Day With Meals., Disp: 20 tablet, Rfl: 0  •  olmesartan-hydrochlorothiazide (BENICAR HCT) 40-25 MG per  tablet, Take 1 tablet by mouth Daily for blood pressure., Disp: 30 tablet, Rfl: 1  •  propranolol (INDERAL) 20 MG tablet, Take 1 tablet by mouth 3 (Three) Times a Day., Disp: 270 tablet, Rfl: 1  •  simvastatin (ZOCOR) 10 MG tablet, Take 1 tablet by mouth daily for high cholesterol, Disp: 30 tablet, Rfl: 6      Family History   Problem Relation Age of Onset   • Throat cancer Father    • Stroke Father    • Hypertension Other         Benign Essential   • Stroke Brother    • Breast cancer Neg Hx    • Ovarian cancer Neg Hx    • Uterine cancer Neg Hx    • Colon cancer Neg Hx    • Pancreatic cancer Neg Hx          Social History     Socioeconomic History   • Marital status: Legally      Spouse name: Not on file   • Number of children: Not on file   • Years of education: Not on file   • Highest education level: Not on file   Occupational History   • Occupation:  for [employer]   Social Needs   • Financial resource strain: Not hard at all   • Food insecurity:     Worry: Never true     Inability: Never true   • Transportation needs:     Medical: No     Non-medical: No   Tobacco Use   • Smoking status: Never Smoker   • Smokeless tobacco: Never Used   Substance and Sexual Activity   • Alcohol use: Yes     Frequency: 2-4 times a month     Drinks per session: 1 or 2     Binge frequency: Never     Comment: Socially   • Drug use: No   • Sexual activity: Yes     Partners: Male     Birth control/protection: Surgical     Comment: hysterectomy    Lifestyle   • Physical activity:     Days per week: 0 days     Minutes per session: 0 min   • Stress: Very much   Relationships   • Social connections:     Talks on phone: Patient refused     Gets together: Patient refused     Attends [employer] service: Patient refused     Active member of club or organization: Patient refused     Attends meetings of clubs or organizations: Patient refused     Relationship status: Patient refused         Vitals:    07/09/19 1155   BP:  "118/78   BP Location: Right arm   Pulse: 64   Temp: 98.3 °F (36.8 °C)   SpO2: 99%   Weight: 87.3 kg (192 lb 6.4 oz)   Height: 157.5 cm (62.01\")          Physical Exam:    General: Alert and oriented x 3.  No acute distress.  Normal affect.  HEENT: Pupils equal, round, reactive to light; extraocular movements intact; sclerae nonicteric; pharynx, ear canals and TMs normal.  I do not appreciate any nystagmus.  Neck: Without JVD, thyromegaly, bruit, or adenopathy.  Lungs: Clear to auscultation in all fields.  Heart: Regular rate and rhythm without murmur, rub, gallop, or click.  Abdomen: Soft, nontender, without hepatosplenomegaly or hernia.  Bowel sounds normal.  : Deferred.  Rectal: Deferred.  Extremities: Without clubbing, cyanosis, edema, or pulse deficit.  Neurologic: Intact without focal deficit.  Normal station and gait observed during ingress and egress from the examination room.  Absolutely no focal neurologic defect.  Skin: Without significant lesion.  Musculoskeletal: Unremarkable.    Lab/other results:      Assessment/Plan:     Diagnosis Plan   1. New daily persistent headache     2. Dizziness     3. Benign paroxysmal positional vertigo due to bilateral vestibular disorder     4. History of meningioma     5. Bilious vomiting with nausea       Patient with a history of meningioma resection presents with a 3-day history of persistent headache associated with dizziness described as a spinning/off-balance sensation and may be positional and associated with nausea and vomiting.  Her history is somewhat consistent with positional vertigo but given her history of meningioma and the fact that she has a persistent headache, we need to do radiographic imaging.  Given her previous craniotomy and the history of meningioma, MRI is necessary.    Plan is as follows: MRI of the brain ordered.  Patient referred for vestibular testing and possible treatment as soon as possible.  Further to follow.        Procedures        "

## 2019-07-11 DIAGNOSIS — I10 BENIGN ESSENTIAL HYPERTENSION: Chronic | ICD-10-CM

## 2019-07-12 DIAGNOSIS — I10 BENIGN ESSENTIAL HYPERTENSION: Chronic | ICD-10-CM

## 2019-07-12 RX ORDER — ALPRAZOLAM 0.25 MG/1
0.25 TABLET ORAL 2 TIMES DAILY
Qty: 60 TABLET | Refills: 3 | OUTPATIENT
Start: 2019-07-12 | End: 2020-02-14 | Stop reason: SDUPTHER

## 2019-07-12 RX ORDER — OLMESARTAN MEDOXOMIL AND HYDROCHLOROTHIAZIDE 40/25 40; 25 MG/1; MG/1
1 TABLET ORAL DAILY
Qty: 30 TABLET | Refills: 3 | Status: CANCELLED | OUTPATIENT
Start: 2019-07-12

## 2019-07-12 RX ORDER — OLMESARTAN MEDOXOMIL AND HYDROCHLOROTHIAZIDE 40/25 40; 25 MG/1; MG/1
1 TABLET ORAL DAILY
Qty: 30 TABLET | Refills: 3 | Status: SHIPPED | OUTPATIENT
Start: 2019-07-12 | End: 2020-04-07 | Stop reason: SDUPTHER

## 2019-07-17 ENCOUNTER — HOSPITAL ENCOUNTER (OUTPATIENT)
Dept: MRI IMAGING | Facility: HOSPITAL | Age: 54
Discharge: HOME | End: 2019-07-17

## 2019-07-17 DIAGNOSIS — R11.14 BILIOUS VOMITING WITH NAUSEA: ICD-10-CM

## 2019-07-17 DIAGNOSIS — Z86.018 HISTORY OF MENINGIOMA: ICD-10-CM

## 2019-07-17 DIAGNOSIS — R42 DIZZINESS: ICD-10-CM

## 2019-07-17 DIAGNOSIS — G44.52 NEW DAILY PERSISTENT HEADACHE: ICD-10-CM

## 2019-07-18 ENCOUNTER — HOSPITAL ENCOUNTER (OUTPATIENT)
Dept: MRI IMAGING | Facility: HOSPITAL | Age: 54
Discharge: HOME | End: 2019-07-18
Admitting: INTERNAL MEDICINE

## 2019-07-18 ENCOUNTER — TELEPHONE (OUTPATIENT)
Dept: INTERNAL MEDICINE | Facility: CLINIC | Age: 54
End: 2019-07-18

## 2019-07-18 PROCEDURE — 0 GADOBENATE DIMEGLUMINE 529 MG/ML SOLUTION: Performed by: INTERNAL MEDICINE

## 2019-07-18 PROCEDURE — 70553 MRI BRAIN STEM W/O & W/DYE: CPT

## 2019-07-18 PROCEDURE — A9577 INJ MULTIHANCE: HCPCS | Performed by: INTERNAL MEDICINE

## 2019-07-18 PROCEDURE — 82565 ASSAY OF CREATININE: CPT

## 2019-07-18 RX ADMIN — GADOBENATE DIMEGLUMINE 18 ML: 529 INJECTION, SOLUTION INTRAVENOUS at 21:43

## 2019-07-18 NOTE — TELEPHONE ENCOUNTER
You sent pt for MRI yesterday and she realized she was claustrophobic.  She was not able to complete it.  She is going back tonight but she ask if you could give her something to take to relax her?    [phone]

## 2019-07-19 LAB — CREAT BLDA-MCNC: 1 MG/DL (ref 0.6–1.3)

## 2019-07-22 ENCOUNTER — TELEPHONE (OUTPATIENT)
Dept: INTERNAL MEDICINE | Facility: CLINIC | Age: 54
End: 2019-07-22

## 2019-07-22 NOTE — TELEPHONE ENCOUNTER
I already sent a message explaining that her MRI showed no change and nothing to explain her headaches and other symptoms.

## 2019-07-22 NOTE — TELEPHONE ENCOUNTER
Pt was wanting the results of her MRI.  She was worried cause she was looking in my chart at the results.  She does have a follow up scheduled but didn't want to wait until then.     [phone]    FINDINGS: There is no evidence of restricted diffusion to suggest acute  infarction. A left temporal craniotomy is noted laterally. There is encephalomalacia involving the left temporal lobe anteriorly, unchanged.  There is no evidence of hydrocephalus or of abnormal extra-axial fluid. An area of increased signal intensity is noted involving the subcortical white matter of the right frontal lobe inferolaterally measuring 5 mm in size.     After contrast administration, there was no evidence of abnormal  enhancement.     IMPRESSION:  1. The patient is status post resection of a meningioma involving the left middle cranial fossa. Encephalomalacia is noted involving the left temporal lobe anteriorly. This appears unchanged. There is no evidence of residual or recurrent meningioma.  2. Mild small vessel ischemic disease. No evidence of infarction,  hydrocephalus or of abnormal extra-axial fluid.

## 2019-07-25 ENCOUNTER — OFFICE VISIT (OUTPATIENT)
Dept: INTERNAL MEDICINE | Facility: CLINIC | Age: 54
End: 2019-07-25

## 2019-07-25 VITALS
SYSTOLIC BLOOD PRESSURE: 138 MMHG | DIASTOLIC BLOOD PRESSURE: 90 MMHG | WEIGHT: 196 LBS | HEIGHT: 62 IN | HEART RATE: 72 BPM | BODY MASS INDEX: 36.07 KG/M2 | OXYGEN SATURATION: 99 %

## 2019-07-25 DIAGNOSIS — Z86.018 HISTORY OF MENINGIOMA: ICD-10-CM

## 2019-07-25 DIAGNOSIS — G44.52 NEW DAILY PERSISTENT HEADACHE: Primary | ICD-10-CM

## 2019-07-25 DIAGNOSIS — R11.14 BILIOUS VOMITING WITH NAUSEA: ICD-10-CM

## 2019-07-25 DIAGNOSIS — R42 DIZZINESS: ICD-10-CM

## 2019-07-25 DIAGNOSIS — I10 BENIGN ESSENTIAL HYPERTENSION: Chronic | ICD-10-CM

## 2019-07-25 DIAGNOSIS — H81.13 BENIGN PAROXYSMAL POSITIONAL VERTIGO DUE TO BILATERAL VESTIBULAR DISORDER: ICD-10-CM

## 2019-07-25 PROCEDURE — 99213 OFFICE O/P EST LOW 20 MIN: CPT | Performed by: INTERNAL MEDICINE

## 2019-07-25 RX ORDER — PROPRANOLOL HYDROCHLORIDE 20 MG/1
20 TABLET ORAL 3 TIMES DAILY
Qty: 270 TABLET | Refills: 1 | Status: SHIPPED | OUTPATIENT
Start: 2019-07-25 | End: 2020-03-04 | Stop reason: SDUPTHER

## 2019-07-25 RX ORDER — NAPROXEN SODIUM 550 MG/1
550 TABLET ORAL 2 TIMES DAILY WITH MEALS
Qty: 180 TABLET | Refills: 1 | Status: SHIPPED | OUTPATIENT
Start: 2019-07-25 | End: 2019-11-11

## 2019-07-25 NOTE — PROGRESS NOTES
07/25/2019    Patient Information  Ruba Ohara                                                                                          [address] [facility] [facility] [address]      1965  [unfilled]  [phone] (work)    Chief Complaint:     Follow-up new daily persistent headache, dizziness, nausea and vomiting, possible paroxysmal positional vertigo, history of meningioma, hypertension.    History of Present Illness:    Patient with a history of medical problems as outlined in the chief complaint presents today to follow-up on evaluation of new headaches and dizziness.  The history regarding this will be described below.  Patient reports she now feels totally well and has no new acute complaints.  Her past medical history reviewed and updated were necessary including health maintenance parameters.  This reveals she is up-to-date or else accounted for.    The history regarding new onset headaches and dizziness:    July 25, 2019--patient seen in follow-up and reports her headache as well as the dizziness have totally resolved.  I reviewed the results of the MRI with the patient which revealed evidence of resection of a meningioma involving the left middle cranial fossa.  Encephalomalacia is noted involving the left temporal lobe anteriorly that appears unchanged.  There is no evidence of residual or recurrent meningioma.  Mild small vessel ischemic disease and no evidence of infarction, hydrocephalus, or abnormal extra-axial fluid.    July 9, 2019--patient presents with approximately 3-day history of dizziness which she describes as a spinning off-balance sensation that comes on when she stands upright.  The episodes can last for several hours and is associated with nausea and occasionally emesis for 4 or 5 occasions.  She is also complaining of a bioccipital headache for this entire duration that is described as a throbbing/pounding sensation.  No visual changes.  No numbness or  paresthesias or isolated muscular weakness.  No fever, chills, or other systemic signs or symptoms.  Patient reports she did have slight diarrhea occasionally.  She has mild associated fatigue.  On exam I see no focal neurologic deficit.  I could not elicit any nystagmus but having patient follow my finger with her eyes seem to bother her somewhat.  I will refer patient for vestibular testing and possible treatment but I will also schedule an MRI of the brain, particularly given her history of meningioma and previous seizure disorder.    Review of Systems   Constitution: Negative.   HENT: Negative.    Eyes: Negative.    Cardiovascular: Negative.    Respiratory: Negative.    Endocrine: Negative.    Hematologic/Lymphatic: Negative.    Skin: Negative.    Musculoskeletal: Negative.    Gastrointestinal: Negative.    Genitourinary: Negative.    Neurological: Negative.    Psychiatric/Behavioral: Negative.    Allergic/Immunologic: Negative.        Active Problems:    Patient Active Problem List   Diagnosis   • Anxiety disorder due to general medical condition   • Benign essential hypertension   • Carotid artery plaque, 02/01/2018--16-49% bilateral.  10/29/2013--right ICA plaque.  Normal left ICA.   • Hirsutism   • Hyperlipidemia   • Migraine headaches   • Vitamin D deficiency   • Therapeutic drug monitoring   • Routine physical examination   • History of meningioma   • Depression with anxiety   • Morbid obesity (CMS/HCC)   • Impaired fasting glucose   • Noncompliance   • Hypoactive sexual desire disorder   • Benign paroxysmal positional vertigo due to bilateral vestibular disorder   • New daily persistent headache   • Dizziness   • Bilious vomiting with nausea         Past Medical History:   Diagnosis Date   • Anxiety disorder due to general medical condition 4/14/2016   • Benign essential hypertension 4/12/2006 04/12/2006--treatment for hypertension begun.   • Carotid artery plaque, 02/01/2018--16-49% bilateral.   10/29/2013--right ICA plaque.  Normal left ICA. 10/29/2013    February 1, 2018--carotid Doppler study revealed 16-49% bilateral carotid artery plaque.  10/29/2013--carotid Doppler revealed plaque without stenosis in the right internal carotid artery. There was no evidence of hemodynamically significant stenosis in the left carotid system. Antegrade flow was present in the left vertebral artery.   • Depression with anxiety 4/26/2016   • Hirsutism 4/14/2016   • History of iron deficiency anemia 07/21/2015 07/21/2015--patient was admitted briefly to the hospital with a hemoglobin of 7.3. This was believed to be due to an episode of rectal bleeding, but primarily due to menorrhagia.   • History of meningioma 08/19/2014 08/19/2014--MRI again performed for left sided facial and arm numbness and tingling.  This reveals status post resection of a meningioma via a left temporoparietal craniotomy.  There is no evidence to suggest residual or recurrent tumor.  Again noted is a focus of encephalomalacia within the anterior portion of the left temporal lobe deep to the craniotomy flap.  10/24/2013--MRI of the brain perfo   • History of Seizure disorder, grand mal 4/14/2016    This was related to a benign brain tumor of the left temporal lobe that is believed to be a meningioma. Status post resection and no recurrence of the seizures.   • Hyperlipidemia 2/7/2012 02/07/2012--treatment for hyperlipidemia begun.   • Impaired fasting glucose 4/29/2019 April 29, 2019--routine follow-up.  Fasting serum glucose elevated at 113.  Recommend decreased carbohydrate diet, exercise, weight loss.   • Migraine headaches 10/22/2013    08/19/2014--MRI again performed for left sided facial and arm numbness and tingling.  This reveals status post resection of a meningioma via a left temporoparietal craniotomy.  There is no evidence to suggest residual or recurrent tumor.  Again noted is a focus of encephalomalacia within the anterior  portion of the left temporal lobe deep to the craniotomy flap.  10/29/2013--echocardiogram performed for possible TIA symptoms revealed normal left ventricular size thickness and function. There were no significant valvular abnormalities.  10/24/2013--MRI performed for possible TIA. Prior large 10 cm lateral left temporoparietal craniotomy and an additional 3.5 cm mid to inferior left  temporal craniectomy  by history resection of a meningioma. There is encephalomalacia in anterior inferior left temporal lobe which tracks approximately 3 x 2.1 cm. The remainder of the brain parenchyma is normal in signal intensity. Ventricles are normal. No mass effect noted. No midline shift and no extra-axial fluid collecti   • Noncompliance 4/29/2019 April 29, 2019--this patient has been noncompliant with filling medications, obtaining diagnostic studies such as routine mammogram.   • Vitamin D deficiency 4/14/2016         Past Surgical History:   Procedure Laterality Date   • BRAIN MENINGIOMA EXCISION  26 years old    26 years of age--status post resection of benign brain tumor believed to be a meningioma. Located in the anterior aspect of the left temporal lobe. 10/24/2013--MRI of the brain performed for possible TIA revealed surgical changes of a lateral left temporal parietal craniotomy as well as mid to inferior squamosal left temporal craniotomy consistent with patient's previous surgery. There was a 3.5 x   • COLONOSCOPY  08/20/2015 08/20/2015--entirely normal screening colonoscopy.   • LAPAROSCOPIC HYSTERECTOMY  2010 or so    due to abnormal bleeding due to fibroids, done at [facility], unable to recall physician   • TUBAL ABDOMINAL LIGATION  28 years old    28 years old--tubal ligation.         No Known Allergies        Current Outpatient Medications:   •  ALPRAZolam (XANAX) 0.25 MG tablet, Take 1 tablet by mouth 2 (Two) Times a Day., Disp: 60 tablet, Rfl: 3  •  aspirin  MG EC tablet, Take  by mouth daily.,  Disp: , Rfl:   •  Cholecalciferol (VITAMIN D3) 5000 UNITS capsule capsule, 1 by mouth daily as directed, Disp: 30 capsule, Rfl: 11  •  naproxen sodium (ANAPROX) 550 MG tablet, Take 1 tablet by mouth 2 (Two) Times a Day With Meals., Disp: 20 tablet, Rfl: 0  •  olmesartan-hydrochlorothiazide (BENICAR HCT) 40-25 MG per tablet, Take 1 tablet by mouth Daily for blood pressure., Disp: 30 tablet, Rfl: 3  •  propranolol (INDERAL) 20 MG tablet, Take 1 tablet by mouth 3 (Three) Times a Day., Disp: 270 tablet, Rfl: 1  •  simvastatin (ZOCOR) 10 MG tablet, Take 1 tablet by mouth daily for high cholesterol, Disp: 30 tablet, Rfl: 6  •  diazePAM (VALIUM) 10 MG tablet, Take 1 tablet by mouth 45-60 minutes before procedure, Disp: 1 tablet, Rfl: 0      Family History   Problem Relation Age of Onset   • Throat cancer Father    • Stroke Father    • Hypertension Other         Benign Essential   • Stroke Brother    • Breast cancer Neg Hx    • Ovarian cancer Neg Hx    • Uterine cancer Neg Hx    • Colon cancer Neg Hx    • Pancreatic cancer Neg Hx          Social History     Socioeconomic History   • Marital status: Legally      Spouse name: Not on file   • Number of children: Not on file   • Years of education: Not on file   • Highest education level: Not on file   Occupational History   • Occupation:  for [employer]   Social Needs   • Financial resource strain: Not hard at all   • Food insecurity:     Worry: Never true     Inability: Never true   • Transportation needs:     Medical: No     Non-medical: No   Tobacco Use   • Smoking status: Never Smoker   • Smokeless tobacco: Never Used   Substance and Sexual Activity   • Alcohol use: Yes     Frequency: 2-4 times a month     Drinks per session: 1 or 2     Binge frequency: Never     Comment: Socially   • Drug use: No   • Sexual activity: Yes     Partners: Male     Birth control/protection: Surgical     Comment: hysterectomy    Lifestyle   • Physical activity:     Days  "per week: 0 days     Minutes per session: 0 min   • Stress: Very much   Relationships   • Social connections:     Talks on phone: Patient refused     Gets together: Patient refused     Attends [employer] service: Patient refused     Active member of club or organization: Patient refused     Attends meetings of clubs or organizations: Patient refused     Relationship status: Patient refused         Vitals:    07/25/19 0815   BP: 138/90   BP Location: Left arm   Pulse: 72   SpO2: 99%   Weight: 88.9 kg (196 lb)   Height: 157.5 cm (62.01\")          Physical Exam:    General: Alert and oriented x 3.  No acute distress.  Normal affect.  Obese.  HEENT: Pupils equal, round, reactive to light; extraocular movements intact; sclerae nonicteric; pharynx, ear canals and TMs normal.  Neck: Without JVD, thyromegaly, bruit, or adenopathy.  Lungs: Clear to auscultation in all fields.  Heart: Regular rate and rhythm without murmur, rub, gallop, or click.  Abdomen: Soft, nontender, without hepatosplenomegaly or hernia.  Bowel sounds normal.  : Deferred.  Rectal: Deferred.  Extremities: Without clubbing, cyanosis, edema, or pulse deficit.  Neurologic: Intact without focal deficit.  Normal station and gait observed during ingress and egress from the examination room.  Skin: Without significant lesion.  Musculoskeletal: Unremarkable.    Lab/other results:    I reviewed the MRI of the brain with the patient.  I was not able to review the vestibular therapy session because I have not received any documentation as of yet.    Assessment/Plan:     Diagnosis Plan   1. New daily persistent headache     2. Dizziness     3. Bilious vomiting with nausea     4. Benign paroxysmal positional vertigo due to bilateral vestibular disorder     5. History of meningioma     6. Benign essential hypertension       Patient who has a history of meningioma presented with new persistent daily headache associated with dizziness, nausea, and vomiting.  She had " some characteristics of benign paroxysmal positional vertigo and she had one session of vestibular therapy.  I am not sure if this was successful or not.  At any rate, her symptoms have totally resolved and there is no evidence of recurrent meningioma.  Her blood pressure is elevated today and patient indicates this is because she was somewhat upset due to a heated discussion with her ex-.  I encouraged her to keep an eye on her blood pressure and contact me if it remains elevated.    Plan is as follows: No change in current medical regimen.  Patient will follow-up after February 26, 2020 with lab prior for her annual physical.        Procedures

## 2019-09-23 ENCOUNTER — TELEPHONE (OUTPATIENT)
Dept: INTERNAL MEDICINE | Facility: CLINIC | Age: 54
End: 2019-09-23

## 2019-09-23 ENCOUNTER — DOCUMENTATION (OUTPATIENT)
Dept: INTERNAL MEDICINE | Facility: CLINIC | Age: 54
End: 2019-09-23

## 2019-09-23 NOTE — TELEPHONE ENCOUNTER
Pt called and said she has to do jury duty.  Pt says she wouldn't be able to do it but need a note from you saying she isn't fit enough to do jury duty

## 2019-12-04 ENCOUNTER — HOSPITAL ENCOUNTER (EMERGENCY)
Facility: HOSPITAL | Age: 54
Discharge: HOME | End: 2019-12-04
Attending: EMERGENCY MEDICINE | Admitting: EMERGENCY MEDICINE

## 2019-12-04 ENCOUNTER — APPOINTMENT (OUTPATIENT)
Dept: GENERAL RADIOLOGY | Facility: HOSPITAL | Age: 54
End: 2019-12-04

## 2019-12-04 ENCOUNTER — APPOINTMENT (OUTPATIENT)
Dept: CT IMAGING | Facility: HOSPITAL | Age: 54
End: 2019-12-04

## 2019-12-04 VITALS
RESPIRATION RATE: 17 BRPM | HEART RATE: 75 BPM | OXYGEN SATURATION: 97 % | TEMPERATURE: 97.8 F | WEIGHT: 196 LBS | HEIGHT: 62 IN | BODY MASS INDEX: 36.07 KG/M2 | DIASTOLIC BLOOD PRESSURE: 82 MMHG | SYSTOLIC BLOOD PRESSURE: 141 MMHG

## 2019-12-04 DIAGNOSIS — R20.2 PARESTHESIAS: Primary | ICD-10-CM

## 2019-12-04 LAB
ALBUMIN SERPL-MCNC: 4.3 G/DL (ref 3.5–5.2)
ALBUMIN/GLOB SERPL: 1.4 G/DL
ALP SERPL-CCNC: 83 U/L (ref 39–117)
ALT SERPL W P-5'-P-CCNC: 20 U/L (ref 1–33)
ANION GAP SERPL CALCULATED.3IONS-SCNC: 14.1 MMOL/L (ref 5–15)
AST SERPL-CCNC: 17 U/L (ref 1–32)
BASOPHILS # BLD AUTO: 0.04 10*3/MM3 (ref 0–0.2)
BASOPHILS NFR BLD AUTO: 0.7 % (ref 0–1.5)
BILIRUB SERPL-MCNC: 0.3 MG/DL (ref 0.2–1.2)
BUN BLD-MCNC: 9 MG/DL (ref 6–20)
BUN/CREAT SERPL: 9.7 (ref 7–25)
CALCIUM SPEC-SCNC: 9.1 MG/DL (ref 8.6–10.5)
CHLORIDE SERPL-SCNC: 106 MMOL/L (ref 98–107)
CO2 SERPL-SCNC: 24.9 MMOL/L (ref 22–29)
CREAT BLD-MCNC: 0.93 MG/DL (ref 0.57–1)
DEPRECATED RDW RBC AUTO: 41.9 FL (ref 37–54)
EOSINOPHIL # BLD AUTO: 0.12 10*3/MM3 (ref 0–0.4)
EOSINOPHIL NFR BLD AUTO: 2.2 % (ref 0.3–6.2)
ERYTHROCYTE [DISTWIDTH] IN BLOOD BY AUTOMATED COUNT: 12.9 % (ref 12.3–15.4)
GFR SERPL CREATININE-BSD FRML MDRD: 76 ML/MIN/1.73
GLOBULIN UR ELPH-MCNC: 3.1 GM/DL
GLUCOSE BLD-MCNC: 137 MG/DL (ref 65–99)
HCT VFR BLD AUTO: 37.7 % (ref 34–46.6)
HGB BLD-MCNC: 13.1 G/DL (ref 12–15.9)
IMM GRANULOCYTES # BLD AUTO: 0.01 10*3/MM3 (ref 0–0.05)
IMM GRANULOCYTES NFR BLD AUTO: 0.2 % (ref 0–0.5)
LYMPHOCYTES # BLD AUTO: 1.84 10*3/MM3 (ref 0.7–3.1)
LYMPHOCYTES NFR BLD AUTO: 34.3 % (ref 19.6–45.3)
MCH RBC QN AUTO: 30.8 PG (ref 26.6–33)
MCHC RBC AUTO-ENTMCNC: 34.7 G/DL (ref 31.5–35.7)
MCV RBC AUTO: 88.5 FL (ref 79–97)
MONOCYTES # BLD AUTO: 0.3 10*3/MM3 (ref 0.1–0.9)
MONOCYTES NFR BLD AUTO: 5.6 % (ref 5–12)
NEUTROPHILS # BLD AUTO: 3.05 10*3/MM3 (ref 1.7–7)
NEUTROPHILS NFR BLD AUTO: 57 % (ref 42.7–76)
NRBC BLD AUTO-RTO: 0 /100 WBC (ref 0–0.2)
PLATELET # BLD AUTO: 260 10*3/MM3 (ref 140–450)
PMV BLD AUTO: 10.8 FL (ref 6–12)
POTASSIUM BLD-SCNC: 3.3 MMOL/L (ref 3.5–5.2)
PROT SERPL-MCNC: 7.4 G/DL (ref 6–8.5)
RBC # BLD AUTO: 4.26 10*6/MM3 (ref 3.77–5.28)
SODIUM BLD-SCNC: 145 MMOL/L (ref 136–145)
TROPONIN T SERPL-MCNC: <0.01 NG/ML (ref 0–0.03)
WBC NRBC COR # BLD: 5.36 10*3/MM3 (ref 3.4–10.8)

## 2019-12-04 PROCEDURE — 93005 ELECTROCARDIOGRAM TRACING: CPT | Performed by: PHYSICIAN ASSISTANT

## 2019-12-04 PROCEDURE — 99284 EMERGENCY DEPT VISIT MOD MDM: CPT

## 2019-12-04 PROCEDURE — 85025 COMPLETE CBC W/AUTO DIFF WBC: CPT | Performed by: PHYSICIAN ASSISTANT

## 2019-12-04 PROCEDURE — 70450 CT HEAD/BRAIN W/O DYE: CPT

## 2019-12-04 PROCEDURE — 84484 ASSAY OF TROPONIN QUANT: CPT | Performed by: PHYSICIAN ASSISTANT

## 2019-12-04 PROCEDURE — 71046 X-RAY EXAM CHEST 2 VIEWS: CPT

## 2019-12-04 PROCEDURE — 93010 ELECTROCARDIOGRAM REPORT: CPT | Performed by: INTERNAL MEDICINE

## 2019-12-04 PROCEDURE — 80053 COMPREHEN METABOLIC PANEL: CPT | Performed by: PHYSICIAN ASSISTANT

## 2019-12-04 RX ORDER — ASPIRIN 325 MG
325 TABLET ORAL ONCE
Status: COMPLETED | OUTPATIENT
Start: 2019-12-04 | End: 2019-12-04

## 2019-12-04 RX ORDER — CYCLOBENZAPRINE HCL 10 MG
10 TABLET ORAL 2 TIMES DAILY PRN
Qty: 10 TABLET | Refills: 0 | Status: SHIPPED | OUTPATIENT
Start: 2019-12-04 | End: 2020-11-10

## 2019-12-04 RX ADMIN — ASPIRIN 325 MG: 325 TABLET ORAL at 19:55

## 2019-12-04 NOTE — ED PROVIDER NOTES
" EMERGENCY DEPARTMENT ENCOUNTER    Room Number:  23/23  Date of encounter:  12/4/2019  PCP: Víctor Pike MD  Historian: Pt      HPI:  Chief Complaint: numbness  A complete HPI/ROS/PMH/PSH/SH/FH are unobtainable due to: n/a  Context: Ruba Ohara is a 54 y.o. female who presents to the ED c/o gradual onset, intermittent L-sided facial numbness for the past 3 weeks.  She states that she began \"stuttering\" and having speech difficulty today at 1230 which worried her so she decided to come the ED.  Speech issues now resolved. She also complains of intermittent L foot numbness.  She also confirms intermittent HA but denies current HA.  She denies recent weakness, visual disturbances, cough, urinary sx, SOA, and CP.   She is on ASA.        MEDICAL HISTORY REVIEW    7/18/2019: MRI of brain with and without contrast that showed no changes from previous imaging.      4/17/2014: MRI of brain with and without contrast for L sided face and L arm numbness/tingling.    Pt has hx of meningioma that was resected 25 years ago.  s/p resection with no recurrent or residual tumor.    Pt was seen in [place] ED in 2016 for L sided facial paresthesia.  She received MRI brain with and without contrast, MRA of neck with contrast, and MRA of brain without contrast.  The MRAs were negative.   The MRI was also negative.      PAST MEDICAL HISTORY  Active Ambulatory Problems     Diagnosis Date Noted   • Anxiety disorder due to general medical condition 04/14/2016   • Benign essential hypertension 04/12/2006   • Carotid artery plaque, 02/01/2018--16-49% bilateral.  10/29/2013--right ICA plaque.  Normal left ICA. 10/29/2013   • Hirsutism 04/14/2016   • Hyperlipidemia 02/07/2012   • Migraine headaches 10/22/2013   • Vitamin D deficiency 04/14/2016   • Therapeutic drug monitoring 04/25/2016   • Routine physical examination 04/25/2016   • History of meningioma 04/26/2016   • Depression with anxiety 04/26/2016   • Morbid obesity (CMS/HCC) " 02/25/2019   • Impaired fasting glucose 04/29/2019   • Noncompliance 04/29/2019   • Hypoactive sexual desire disorder 06/13/2019   • Benign paroxysmal positional vertigo due to bilateral vestibular disorder 07/09/2019   • New daily persistent headache 07/09/2019   • Dizziness 07/09/2019   • Bilious vomiting with nausea 07/09/2019     Resolved Ambulatory Problems     Diagnosis Date Noted   • History of transient cerebral ischemia 04/14/2016   • History of Doppler ultrasound of Artery: Carotid 04/14/2016   • History of echocardiogram 04/14/2016   • History of mammogram 04/14/2016   • History of Iron deficiency anemia due to chronic blood loss 04/14/2016   • History of Seizure disorder, grand mal 04/14/2016   • Complicated migraine, intractable 10/22/2013   • Family history of throat cancer 11/21/2016     Past Medical History:   Diagnosis Date   • Anxiety disorder due to general medical condition 4/14/2016   • Benign essential hypertension 4/12/2006   • Carotid artery plaque, 02/01/2018--16-49% bilateral.  10/29/2013--right ICA plaque.  Normal left ICA. 10/29/2013   • Depression with anxiety 4/26/2016   • Hirsutism 4/14/2016   • History of iron deficiency anemia 07/21/2015   • History of meningioma 08/19/2014   • History of Seizure disorder, grand mal 4/14/2016   • Hyperlipidemia 2/7/2012   • Impaired fasting glucose 4/29/2019   • Migraine headaches 10/22/2013   • Noncompliance 4/29/2019   • Vitamin D deficiency 4/14/2016         PAST SURGICAL HISTORY  Past Surgical History:   Procedure Laterality Date   • BRAIN MENINGIOMA EXCISION  26 years old    26 years of age--status post resection of benign brain tumor believed to be a meningioma. Located in the anterior aspect of the left temporal lobe. 10/24/2013--MRI of the brain performed for possible TIA revealed surgical changes of a lateral left temporal parietal craniotomy as well as mid to inferior squamosal left temporal craniotomy consistent with patient's previous  surgery. There was a 3.5 x   • COLONOSCOPY  08/20/2015 08/20/2015--entirely normal screening colonoscopy.   • LAPAROSCOPIC HYSTERECTOMY  2010 or so    due to abnormal bleeding due to fibroids, done at [facility], unable to recall physician   • TUBAL ABDOMINAL LIGATION  28 years old    28 years old--tubal ligation.         FAMILY HISTORY  Family History   Problem Relation Age of Onset   • Throat cancer Father    • Stroke Father    • Hypertension Other         Benign Essential   • Stroke Brother    • Breast cancer Neg Hx    • Ovarian cancer Neg Hx    • Uterine cancer Neg Hx    • Colon cancer Neg Hx    • Pancreatic cancer Neg Hx          SOCIAL HISTORY  Social History     Socioeconomic History   • Marital status: Legally      Spouse name: Not on file   • Number of children: Not on file   • Years of education: Not on file   • Highest education level: Not on file   Occupational History   • Occupation:  for [employer]   Social Needs   • Financial resource strain: Not hard at all   • Food insecurity:     Worry: Never true     Inability: Never true   • Transportation needs:     Medical: No     Non-medical: No   Tobacco Use   • Smoking status: Never Smoker   • Smokeless tobacco: Never Used   Substance and Sexual Activity   • Alcohol use: Yes     Frequency: 2-4 times a month     Drinks per session: 1 or 2     Binge frequency: Never     Comment: Socially   • Drug use: No   • Sexual activity: Yes     Partners: Male     Birth control/protection: Surgical     Comment: hysterectomy    Lifestyle   • Physical activity:     Days per week: 0 days     Minutes per session: 0 min   • Stress: Very much   Relationships   • Social connections:     Talks on phone: Patient refused     Gets together: Patient refused     Attends Gnosticist service: Patient refused     Active member of club or organization: Patient refused     Attends meetings of clubs or organizations: Patient refused     Relationship status: Patient  "refused         ALLERGIES  Patient has no known allergies.        REVIEW OF SYSTEMS  Review of Systems   Neurological: Positive for speech difficulty (\"stuttering\"), numbness (L foot, L-sided face) and headaches ( intermittent).        All systems reviewed and negative except for those discussed in HPI.       PHYSICAL EXAM    I have reviewed the triage vital signs and nursing notes.    ED Triage Vitals [12/04/19 1636]   Temp Heart Rate Resp BP SpO2   97.8 °F (36.6 °C) 98 16 -- 100 %      Temp src Heart Rate Source Patient Position BP Location FiO2 (%)   Tympanic Monitor -- -- --       GENERAL: not distressed  HENT: nares patent  EYES: no scleral icterus  CV: regular rhythm, regular rate  RESPIRATORY: normal effort, CTAB  ABDOMEN: soft, nontender, nondistended   MUSCULOSKELETAL: no deformity  NEURO: alert, moves all extremities, follows commands   GCS is 15  Cranial nerves II-XII are intact.  No facial droop. Uvula is midline. No tongue deviation. PERRL and EOMI. There is no dysarthria, aphasia or slurred speech.  Sensation is intact to light touch bilaterally and is symmetrical in the face, BUE and BLE.  Strength is 5/5 and symmetrical in the BUE and BLE.  Finger to nose, heel to shin, and rapid alternating movements are intact.  Gait is normal  NIH is 1 due to sensation changes in L foot.    SKIN: warm, dry        LAB RESULTS  Recent Results (from the past 24 hour(s))   Comprehensive Metabolic Panel    Collection Time: 12/04/19  5:58 PM   Result Value Ref Range    Glucose 137 (H) 65 - 99 mg/dL    BUN 9 6 - 20 mg/dL    Creatinine 0.93 0.57 - 1.00 mg/dL    Sodium 145 136 - 145 mmol/L    Potassium 3.3 (L) 3.5 - 5.2 mmol/L    Chloride 106 98 - 107 mmol/L    CO2 24.9 22.0 - 29.0 mmol/L    Calcium 9.1 8.6 - 10.5 mg/dL    Total Protein 7.4 6.0 - 8.5 g/dL    Albumin 4.30 3.50 - 5.20 g/dL    ALT (SGPT) 20 1 - 33 U/L    AST (SGOT) 17 1 - 32 U/L    Alkaline Phosphatase 83 39 - 117 U/L    Total Bilirubin 0.3 0.2 - 1.2 mg/dL    " eGFR  African Amer 76 >60 mL/min/1.73    Globulin 3.1 gm/dL    A/G Ratio 1.4 g/dL    BUN/Creatinine Ratio 9.7 7.0 - 25.0    Anion Gap 14.1 5.0 - 15.0 mmol/L   Troponin    Collection Time: 12/04/19  5:58 PM   Result Value Ref Range    Troponin T <0.010 0.000 - 0.030 ng/mL   CBC Auto Differential    Collection Time: 12/04/19  5:58 PM   Result Value Ref Range    WBC 5.36 3.40 - 10.80 10*3/mm3    RBC 4.26 3.77 - 5.28 10*6/mm3    Hemoglobin 13.1 12.0 - 15.9 g/dL    Hematocrit 37.7 34.0 - 46.6 %    MCV 88.5 79.0 - 97.0 fL    MCH 30.8 26.6 - 33.0 pg    MCHC 34.7 31.5 - 35.7 g/dL    RDW 12.9 12.3 - 15.4 %    RDW-SD 41.9 37.0 - 54.0 fl    MPV 10.8 6.0 - 12.0 fL    Platelets 260 140 - 450 10*3/mm3    Neutrophil % 57.0 42.7 - 76.0 %    Lymphocyte % 34.3 19.6 - 45.3 %    Monocyte % 5.6 5.0 - 12.0 %    Eosinophil % 2.2 0.3 - 6.2 %    Basophil % 0.7 0.0 - 1.5 %    Immature Grans % 0.2 0.0 - 0.5 %    Neutrophils, Absolute 3.05 1.70 - 7.00 10*3/mm3    Lymphocytes, Absolute 1.84 0.70 - 3.10 10*3/mm3    Monocytes, Absolute 0.30 0.10 - 0.90 10*3/mm3    Eosinophils, Absolute 0.12 0.00 - 0.40 10*3/mm3    Basophils, Absolute 0.04 0.00 - 0.20 10*3/mm3    Immature Grans, Absolute 0.01 0.00 - 0.05 10*3/mm3    nRBC 0.0 0.0 - 0.2 /100 WBC       Ordered the above labs and independently reviewed the results.        RADIOLOGY  Xr Chest 2 View    Result Date: 12/4/2019  XR CHEST 2 VW-  HISTORY: Female who is 54 years-old,  paresthesias  TECHNIQUE: Frontal and lateral views of the chest  COMPARISON: 08/20/2018  FINDINGS: Heart, mediastinum and pulmonary vasculature are unremarkable. No focal pulmonary consolidation, pleural effusion, or pneumothorax. No acute osseous process.      No evidence for acute pulmonary process. Follow-up as clinical indications persist.  This report was finalized on 12/4/2019 6:06 PM by Dr. Gerald Borges M.D.      Ct Head Without Contrast    Result Date: 12/4/2019  CT OF THE BRAIN WITHOUT CONTRAST 12/04/2019.   HISTORY: Left face and left foot numbness. Stuttering.  FINDINGS: Axial images were obtained through the brain without intravenous contrast. Left frontoparietal craniotomy is evident with a craniectomy defect in the left temporal bone. Underlying encephalomalacia is seen in the left middle cranial fossa. These findings were also seen on the 04/07/2016 study.  There is no evidence of acute infarction, hemorrhage, midline shift or mass effect.      1. Status post left temporal/frontoparietal craniotomy/craniectomy. 2. No acute intracranial process identified. 3. Findings appear unchanged from the previous study of 04/07/2016.  Radiation dose reduction techniques were utilized, including automated exposure control and exposure modulation based on body size.         I ordered the above noted radiological studies. Reviewed by me and discussed with radiologist.  See dictation for official radiology interpretation.      PROCEDURES    Procedures      MEDICATIONS GIVEN IN ER    Medications   aspirin tablet 325 mg (not administered)         PROGRESS, DATA ANALYSIS, CONSULTS, AND MEDICAL DECISION MAKING      1735: Spoke with Dr. Delong, (Stroke Neurology).  He recommends plain CT head.  If CT head is negative offer full strength ASA and offer admission for observation.      1750: Discussed pt's case with Dr. Amauri MD.  After bedside evaluation, they agree with the plan of care.      1936: Rechecked the patient who is resting comfortably and in NAD. Patient is stable.  Informed the patient of CT head which was negative acute. Offered the pt admission for TIA workup, but she would like to be discharged because she has appointment with her PCP next week.  Will give pt ASA. Encouraged her to take ASA 325mg daily. Pt agrees with plans all questions answered.     All labs have been independently reviewed by me.  All radiology studies have been reviewed by me and discussed with radiologist dictating the report.   EKG's  independently viewed and interpreted by me.  Discussion below represents my analysis of pertinent findings related to patient's condition, differential diagnosis, treatment plan and final disposition.      ED Course as of Dec 04 1940   Wed Dec 04, 2019   1756 BP is 132/78  [KA]      ED Course User Index  [KA] Ceci Vogt PA       AS OF 7:40 PM VITALS:    BP - 141/82  HR - 75  TEMP - 97.8 °F (36.6 °C) (Tympanic)  O2 SATS - 97%        DIAGNOSIS  Final diagnoses:   Paresthesias         DISPOSITION  DISCHARGE    Patient discharged in stable condition.    Reviewed implications of results, diagnosis, meds, responsibility to follow up, warning signs and symptoms of possible worsening, potential complications and reasons to return to ER.    Patient/Family voiced understanding of above instructions.    Discussed plan for discharge, as there is no emergent indication for admission. Patient referred to primary care provider for BP management due to today's BP. Pt/family is agreeable and understands need for follow up and repeat testing.  Pt is aware that discharge does not mean that nothing is wrong but it indicates no emergency is present that requires admission and they must continue care with follow-up as given below or physician of their choice.     FOLLOW-UP  Víctor Pike MD  [address] [address]  [facility] [id number] [phone]      on Monday as scheduled    [facility] NEUROLOGY  [address]  [place] [facility] [place] [facility] [address] [phone]  Schedule an appointment as soon as possible for a visit            Medication List      No changes were made to your prescriptions during this visit.               Documentation assistance provided by min Chavez for Sin Lombardi PA-C.  Information recorded by the min was done at my direction and has been verified and validated by me.       Saeed Chavez  12/04/19 1941       Ceci Vogt PA  12/06/19 3139

## 2019-12-04 NOTE — ED PROVIDER NOTES
Pt presents to the ED complaining of L sided toe and finger numbness that has been intermittent for the past 3 weeks. Pt reports that stress seems to bring the symptoms on.    On exam, pt is A&Ox3, NAD, PERRL, EOMI, Moist MM, RRR, 5/5 strength in bilateral UE and LE, minimal decreased sensation in L hand and foot, normal neuro exam otherwise.     I agree with midlevel plan to check CT head and re-eval.    Case was discussed with neurology.  And patient was discharged home in stable condition.    The DARRON and I have discussed this patient's history, physical exam, and treatment plan.  I have reviewed the documentation and personally had a face to face interaction with the patient. I affirm the documentation and agree with the treatment and plan.  The attached note describes my personal findings.    Documentation assistance provided by min Roberts for .  Information recorded by the scribe was done at my direction and has been verified and validated by me.       Caroline Roberts  12/04/19 180       Harjinder Baugh MD  12/05/19 1811

## 2019-12-04 NOTE — ED NOTES
Pt c/o intermittent L sided arm and foot numbness that began 2 days ago. Dizziness got bad today. Denies chest pain. Pt without noted bilateral arm drift at desk. No other neuro deficits noted.     Genesis Aguilar, RN  12/04/19 [id number]

## 2019-12-05 NOTE — DISCHARGE INSTRUCTIONS
Follow up with your PCP as scheduled and with Neurology for further evaluation of your symptoms.  Return to the ER foe any new or worsening symptoms or concerns..

## 2019-12-09 ENCOUNTER — OFFICE VISIT (OUTPATIENT)
Dept: INTERNAL MEDICINE | Facility: CLINIC | Age: 54
End: 2019-12-09

## 2019-12-09 VITALS
HEIGHT: 62 IN | DIASTOLIC BLOOD PRESSURE: 80 MMHG | SYSTOLIC BLOOD PRESSURE: 138 MMHG | OXYGEN SATURATION: 98 % | WEIGHT: 199.4 LBS | BODY MASS INDEX: 36.7 KG/M2 | HEART RATE: 73 BPM

## 2019-12-09 DIAGNOSIS — Z86.018 HISTORY OF MENINGIOMA: Chronic | ICD-10-CM

## 2019-12-09 DIAGNOSIS — R53.1 ACUTE LEFT-SIDED WEAKNESS: ICD-10-CM

## 2019-12-09 DIAGNOSIS — G43.109 COMPLICATED MIGRAINE: Primary | ICD-10-CM

## 2019-12-09 DIAGNOSIS — R47.1 DYSARTHRIA: ICD-10-CM

## 2019-12-09 DIAGNOSIS — M75.22 BICIPITAL TENDINITIS OF LEFT SHOULDER: ICD-10-CM

## 2019-12-09 DIAGNOSIS — IMO0002 CHRONIC MIGRAINE: Chronic | ICD-10-CM

## 2019-12-09 PROBLEM — H81.13 BENIGN PAROXYSMAL POSITIONAL VERTIGO DUE TO BILATERAL VESTIBULAR DISORDER: Status: RESOLVED | Noted: 2019-07-09 | Resolved: 2019-12-09

## 2019-12-09 PROBLEM — R11.14 BILIOUS VOMITING WITH NAUSEA: Status: RESOLVED | Noted: 2019-07-09 | Resolved: 2019-12-09

## 2019-12-09 PROBLEM — G44.52 NEW DAILY PERSISTENT HEADACHE: Status: RESOLVED | Noted: 2019-07-09 | Resolved: 2019-12-09

## 2019-12-09 PROBLEM — F52.0 HYPOACTIVE SEXUAL DESIRE DISORDER: Chronic | Status: ACTIVE | Noted: 2019-06-13

## 2019-12-09 PROBLEM — R42 DIZZINESS: Status: RESOLVED | Noted: 2019-07-09 | Resolved: 2019-12-09

## 2019-12-09 PROCEDURE — 99214 OFFICE O/P EST MOD 30 MIN: CPT | Performed by: INTERNAL MEDICINE

## 2019-12-09 NOTE — PROGRESS NOTES
12/09/2019    Patient Information  Ruba Ohara                                                                                          [address] [facility] [address]      1965  [unfilled]  [phone] (work)    Chief Complaint:       History of Present Illness:      Review of Systems   Constitution: Negative.   HENT: Negative.    Eyes: Negative.    Cardiovascular: Negative.    Respiratory: Negative.    Endocrine: Negative.    Hematologic/Lymphatic: Negative.    Skin: Negative.    Musculoskeletal: Negative.         Anterior left shoulder pain and tenderness.   Gastrointestinal: Negative.    Genitourinary: Negative.    Neurological: Positive for numbness.        Stuttering speech.  Intermittent left-sided weakness.   Psychiatric/Behavioral: Negative.    Allergic/Immunologic: Negative.        Active Problems:    Patient Active Problem List   Diagnosis   • Anxiety disorder due to general medical condition   • Benign essential hypertension   • Carotid artery plaque, 02/01/2018--16-49% bilateral.  10/29/2013--right ICA plaque.  Normal left ICA.   • Hirsutism   • Hyperlipidemia   • Migraine headaches   • Vitamin D deficiency   • Therapeutic drug monitoring   • Routine physical examination   • Complicated migraine   • History of meningioma   • Depression with anxiety   • Morbid obesity (CMS/HCC)   • Impaired fasting glucose   • Noncompliance   • Hypoactive sexual desire disorder   • Dysarthria   • Acute left-sided weakness   • Bicipital tendinitis of left shoulder         Past Medical History:   Diagnosis Date   • Anxiety disorder due to general medical condition 4/14/2016   • Benign essential hypertension 4/12/2006 04/12/2006--treatment for hypertension begun.   • Carotid artery plaque, 02/01/2018--16-49% bilateral.  10/29/2013--right ICA plaque.  Normal left ICA. 10/29/2013    February 1, 2018--carotid Doppler study revealed 16-49% bilateral carotid artery plaque.   10/29/2013--carotid Doppler revealed plaque without stenosis in the right internal carotid artery. There was no evidence of hemodynamically significant stenosis in the left carotid system. Antegrade flow was present in the left vertebral artery.   • Depression with anxiety 4/26/2016   • Hirsutism 4/14/2016   • History of iron deficiency anemia 07/21/2015 07/21/2015--patient was admitted briefly to the hospital with a hemoglobin of 7.3. This was believed to be due to an episode of rectal bleeding, but primarily due to menorrhagia.   • History of meningioma 08/19/2014 08/19/2014--MRI again performed for left sided facial and arm numbness and tingling.  This reveals status post resection of a meningioma via a left temporoparietal craniotomy.  There is no evidence to suggest residual or recurrent tumor.  Again noted is a focus of encephalomalacia within the anterior portion of the left temporal lobe deep to the craniotomy flap.  10/24/2013--MRI of the brain perfo   • History of Seizure disorder, grand mal 4/14/2016    This was related to a benign brain tumor of the left temporal lobe that is believed to be a meningioma. Status post resection and no recurrence of the seizures.   • Hyperlipidemia 2/7/2012 02/07/2012--treatment for hyperlipidemia begun.   • Hypoactive sexual desire disorder 6/13/2019   • Impaired fasting glucose 4/29/2019 April 29, 2019--routine follow-up.  Fasting serum glucose elevated at 113.  Recommend decreased carbohydrate diet, exercise, weight loss.   • Migraine headaches 10/22/2013    08/19/2014--MRI again performed for left sided facial and arm numbness and tingling.  This reveals status post resection of a meningioma via a left temporoparietal craniotomy.  There is no evidence to suggest residual or recurrent tumor.  Again noted is a focus of encephalomalacia within the anterior portion of the left temporal lobe deep to the craniotomy flap.  10/29/2013--echocardiogram performed for  possible TIA symptoms revealed normal left ventricular size thickness and function. There were no significant valvular abnormalities.  10/24/2013--MRI performed for possible TIA. Prior large 10 cm lateral left temporoparietal craniotomy and an additional 3.5 cm mid to inferior left  temporal craniectomy  by history resection of a meningioma. There is encephalomalacia in anterior inferior left temporal lobe which tracks approximately 3 x 2.1 cm. The remainder of the brain parenchyma is normal in signal intensity. Ventricles are normal. No mass effect noted. No midline shift and no extra-axial fluid collecti   • Noncompliance 4/29/2019 April 29, 2019--this patient has been noncompliant with filling medications, obtaining diagnostic studies such as routine mammogram.   • Vitamin D deficiency 4/14/2016         Past Surgical History:   Procedure Laterality Date   • BRAIN MENINGIOMA EXCISION  26 years old    26 years of age--status post resection of benign brain tumor believed to be a meningioma. Located in the anterior aspect of the left temporal lobe. 10/24/2013--MRI of the brain performed for possible TIA revealed surgical changes of a lateral left temporal parietal craniotomy as well as mid to inferior squamosal left temporal craniotomy consistent with patient's previous surgery. There was a 3.5 x   • COLONOSCOPY  08/20/2015 08/20/2015--entirely normal screening colonoscopy.   • LAPAROSCOPIC HYSTERECTOMY  2010 or so    due to abnormal bleeding due to fibroids, done at [facility], unable to recall physician   • TUBAL ABDOMINAL LIGATION  28 years old    28 years old--tubal ligation.         No Known Allergies        Current Outpatient Medications:   •  ALPRAZolam (XANAX) 0.25 MG tablet, Take 1 tablet by mouth 2 (Two) Times a Day., Disp: 60 tablet, Rfl: 3  •  aspirin  MG EC tablet, Take  by mouth daily., Disp: , Rfl:   •  Cholecalciferol (VITAMIN D3) 5000 UNITS capsule capsule, 1 by mouth daily as directed,  Disp: 30 capsule, Rfl: 11  •  olmesartan-hydrochlorothiazide (BENICAR HCT) 40-25 MG per tablet, Take 1 tablet by mouth Daily for blood pressure., Disp: 30 tablet, Rfl: 3  •  propranolol (INDERAL) 20 MG tablet, Take 1 tablet by mouth 3 (Three) Times a Day., Disp: 270 tablet, Rfl: 1  •  cyclobenzaprine (FLEXERIL) 10 MG tablet, Take 1 tablet by mouth 2 (Two) Times a Day As Needed for Muscle Spasms., Disp: 10 tablet, Rfl: 0      Family History   Problem Relation Age of Onset   • Throat cancer Father    • Stroke Father    • Hypertension Other         Benign Essential   • Stroke Brother    • Breast cancer Neg Hx    • Ovarian cancer Neg Hx    • Uterine cancer Neg Hx    • Colon cancer Neg Hx    • Pancreatic cancer Neg Hx          Social History     Socioeconomic History   • Marital status: Legally      Spouse name: Not on file   • Number of children: Not on file   • Years of education: Not on file   • Highest education level: Not on file   Occupational History   • Occupation:  for [employer]   Social Needs   • Financial resource strain: Not hard at all   • Food insecurity:     Worry: Never true     Inability: Never true   • Transportation needs:     Medical: No     Non-medical: No   Tobacco Use   • Smoking status: Never Smoker   • Smokeless tobacco: Never Used   Substance and Sexual Activity   • Alcohol use: Yes     Frequency: 2-4 times a month     Drinks per session: 1 or 2     Binge frequency: Never     Comment: Socially   • Drug use: No   • Sexual activity: Yes     Partners: Male     Birth control/protection: Surgical     Comment: hysterectomy    Lifestyle   • Physical activity:     Days per week: 0 days     Minutes per session: 0 min   • Stress: Not at all   Relationships   • Social connections:     Talks on phone: Patient refused     Gets together: Patient refused     Attends [employer] service: Patient refused     Active member of club or organization: Patient refused     Attends meetings of  "clubs or organizations: Patient refused     Relationship status: Patient refused         Vitals:    12/09/19 0708   BP: 138/80   BP Location: Left arm   Pulse: 73   SpO2: 98%   Weight: 90.4 kg (199 lb 6.4 oz)   Height: 157.5 cm (62.01\")        Body mass index is 36.46 kg/m².      Physical Exam:    General: Alert and oriented x 3.  No acute distress.  Normal affect.  HEENT: Pupils equal, round, reactive to light; extraocular movements intact; sclerae nonicteric; pharynx, ear canals and TMs normal.  Neck: Without JVD, thyromegaly, bruit, or adenopathy.  Lungs: Clear to auscultation in all fields.  Heart: Regular rate and rhythm without murmur, rub, gallop, or click.  Abdomen: Soft, nontender, without hepatosplenomegaly or hernia.  Bowel sounds normal.  : Deferred.  Rectal: Deferred.  Extremities: Without clubbing, cyanosis, edema, or pulse deficit.  Neurologic: Intact without focal deficit.  Normal station and gait observed during ingress and egress from the examination room.  Neurologic exam is negative for focal defect.  She has good strength in her upper and lower extremities.  Finger-to-nose is normal.  Romberg is negative.  Skin: Without significant lesion.  Musculoskeletal: There is tenderness noted over the short head of the biceps tendon on the left.  She has fairly good range of motion at the left shoulder.    Lab/other results:    Reviewed the documentation from the recent emergency room visit including the history and physical and the CT scan of the brain.    Assessment/Plan:     Diagnosis Plan   1. Complicated migraine     2. Migraine headaches     3. History of meningioma     4. Dysarthria     5. Acute left-sided weakness     6. Bicipital tendinitis of left shoulder       Patient with a history of what was felt to be complicated migraines and previous history of possibly TIA-like symptoms a few years ago presents with symptoms that are worrisome for TIA, particularly the symptoms he presented to the " emergency room with but currently is not having today.  She seems more concerned about the numbness in her left foot.  There is no slurred speech today and no weakness.  I am not convinced that the symptoms that she is having is related to a TIA I am also not convinced this is related to a complicated migraine.  I think her left upper extremity pain and tenderness over the short head of the biceps is likely bicipital tendinitis of an unrelated issue.  Given the whole constellation of neurologic symptoms, I think patient does need a neurologist and fortunately we do have an excellent neurologist on staff now.    Plan is as follows: CTA of the neck and head ASAP.  Neurology referral ASAP.  Patient instructed to stay on aspirin 325 mg/day.  She needs to report to emergency room immediately for any worsening or persistent symptoms.  I tried to alleviate her fears that I do not think she is having a TIA.  However, I cannot be 100% sure of this at the present time.  Orthopedic referral for the left bicipital tendinitis/shoulder pain.    Addendum: At the end of the visit after reviewing the chart I have decided to order an MR angiogram of the neck and head.  This would be better than just plain MRI of the brain.    Procedures

## 2019-12-30 ENCOUNTER — HOSPITAL ENCOUNTER (OUTPATIENT)
Dept: MRI IMAGING | Facility: HOSPITAL | Age: 54
Discharge: HOME | End: 2019-12-30
Admitting: INTERNAL MEDICINE

## 2019-12-30 ENCOUNTER — HOSPITAL ENCOUNTER (OUTPATIENT)
Dept: MRI IMAGING | Facility: HOSPITAL | Age: 54
Discharge: HOME | End: 2019-12-30

## 2019-12-30 DIAGNOSIS — R47.1 DYSARTHRIA: ICD-10-CM

## 2019-12-30 DIAGNOSIS — R53.1 ACUTE LEFT-SIDED WEAKNESS: ICD-10-CM

## 2019-12-30 DIAGNOSIS — G43.109 COMPLICATED MIGRAINE: ICD-10-CM

## 2019-12-30 PROCEDURE — 70547 MR ANGIOGRAPHY NECK W/O DYE: CPT

## 2019-12-30 PROCEDURE — 70544 MR ANGIOGRAPHY HEAD W/O DYE: CPT

## 2020-02-14 RX ORDER — ALPRAZOLAM 0.25 MG/1
0.25 TABLET ORAL 2 TIMES DAILY
Qty: 60 TABLET | Refills: 3 | OUTPATIENT
Start: 2020-02-14 | End: 2020-11-10

## 2020-02-19 DIAGNOSIS — R73.01 IMPAIRED FASTING GLUCOSE: ICD-10-CM

## 2020-02-19 DIAGNOSIS — E55.9 VITAMIN D DEFICIENCY: Chronic | ICD-10-CM

## 2020-02-19 DIAGNOSIS — E78.5 HYPERLIPIDEMIA, UNSPECIFIED HYPERLIPIDEMIA TYPE: Primary | Chronic | ICD-10-CM

## 2020-02-19 DIAGNOSIS — Z00.00 ROUTINE PHYSICAL EXAMINATION: ICD-10-CM

## 2020-03-05 RX ORDER — PROPRANOLOL HYDROCHLORIDE 20 MG/1
20 TABLET ORAL 3 TIMES DAILY
Qty: 270 TABLET | Refills: 1 | Status: SHIPPED | OUTPATIENT
Start: 2020-03-05 | End: 2020-11-10 | Stop reason: SDUPTHER

## 2020-04-07 DIAGNOSIS — I10 BENIGN ESSENTIAL HYPERTENSION: Chronic | ICD-10-CM

## 2020-04-07 RX ORDER — OLMESARTAN MEDOXOMIL AND HYDROCHLOROTHIAZIDE 40/25 40; 25 MG/1; MG/1
1 TABLET ORAL DAILY
Qty: 30 TABLET | Refills: 3 | Status: SHIPPED | OUTPATIENT
Start: 2020-04-07 | End: 2020-11-10 | Stop reason: SDUPTHER

## 2020-09-15 RX ORDER — ALPRAZOLAM 0.25 MG/1
0.25 TABLET ORAL 2 TIMES DAILY
Qty: 60 TABLET | Refills: 3 | OUTPATIENT
Start: 2020-09-15

## 2020-09-16 ENCOUNTER — TELEPHONE (OUTPATIENT)
Dept: INTERNAL MEDICINE | Facility: CLINIC | Age: 55
End: 2020-09-16

## 2020-09-22 RX ORDER — ALPRAZOLAM 0.25 MG/1
0.25 TABLET ORAL 2 TIMES DAILY
Qty: 60 TABLET | Refills: 3 | OUTPATIENT
Start: 2020-09-22

## 2020-09-22 NOTE — TELEPHONE ENCOUNTER
DELETE AFTER REVIEWING: Telephone encounter to be sent to the clinical pool.  If patient has less than a 3 day supply left, send the encounter HIGH Priority.    Caller: Ruba Ohara    Relationship: Self    Best call back number: [phone]  Medication needed:   Requested Prescriptions     Pending Prescriptions Disp Refills   • ALPRAZolam (XANAX) 0.25 MG tablet 60 tablet 3     Sig: Take 1 tablet by mouth 2 (Two) Times a Day.     Patient has been out of medication for a few days   Pharmacy states there is a generic of this medication     Does the patient have less than a 3 day supply:  [x] Yes  [] No    What is the patient's preferred pharmacy: [facility]     Patient has appointment scheduled on 9/30

## 2020-10-27 ENCOUNTER — TELEPHONE (OUTPATIENT)
Dept: INTERNAL MEDICINE | Facility: CLINIC | Age: 55
End: 2020-10-27

## 2020-10-27 NOTE — TELEPHONE ENCOUNTER
PATIENT IS REQUESTING DR OTOOLE' NURSE GIVE HER A CALL BACK AS SOON AS POSSIBLE.    PLEASE ADVISE: [phone]

## 2020-11-03 ENCOUNTER — LAB (OUTPATIENT)
Dept: LAB | Facility: HOSPITAL | Age: 55
End: 2020-11-03

## 2020-11-03 DIAGNOSIS — E78.2 MIXED HYPERLIPIDEMIA: Primary | ICD-10-CM

## 2020-11-03 DIAGNOSIS — E55.9 VITAMIN D DEFICIENCY: Chronic | ICD-10-CM

## 2020-11-03 DIAGNOSIS — Z51.81 THERAPEUTIC DRUG MONITORING: ICD-10-CM

## 2020-11-03 DIAGNOSIS — I10 BENIGN ESSENTIAL HYPERTENSION: Chronic | ICD-10-CM

## 2020-11-03 DIAGNOSIS — Z00.00 ROUTINE PHYSICAL EXAMINATION: ICD-10-CM

## 2020-11-03 DIAGNOSIS — R73.01 IMPAIRED FASTING GLUCOSE: Chronic | ICD-10-CM

## 2020-11-03 LAB
25(OH)D3 SERPL-MCNC: 66.8 NG/ML (ref 30–100)
ALBUMIN SERPL-MCNC: 4.3 G/DL (ref 3.5–5.2)
ALBUMIN/GLOB SERPL: 1.6 G/DL
ALP SERPL-CCNC: 87 U/L (ref 39–117)
ALT SERPL W P-5'-P-CCNC: 34 U/L (ref 1–33)
ANION GAP SERPL CALCULATED.3IONS-SCNC: 14.6 MMOL/L (ref 5–15)
AST SERPL-CCNC: 23 U/L (ref 1–32)
BILIRUB SERPL-MCNC: 0.4 MG/DL (ref 0–1.2)
BILIRUB UR QL STRIP: NEGATIVE
BUN SERPL-MCNC: 15 MG/DL (ref 6–20)
BUN/CREAT SERPL: 13.9 (ref 7–25)
CALCIUM SPEC-SCNC: 9.7 MG/DL (ref 8.6–10.5)
CHLORIDE SERPL-SCNC: 104 MMOL/L (ref 98–107)
CK SERPL-CCNC: 302 U/L (ref 20–180)
CLARITY UR: ABNORMAL
CO2 SERPL-SCNC: 26.4 MMOL/L (ref 22–29)
COLOR UR: YELLOW
CREAT SERPL-MCNC: 1.08 MG/DL (ref 0.57–1)
DEPRECATED RDW RBC AUTO: 41.6 FL (ref 37–54)
ERYTHROCYTE [DISTWIDTH] IN BLOOD BY AUTOMATED COUNT: 13.3 % (ref 12.3–15.4)
GFR SERPL CREATININE-BSD FRML MDRD: 64 ML/MIN/1.73
GLOBULIN UR ELPH-MCNC: 2.7 GM/DL
GLUCOSE SERPL-MCNC: 132 MG/DL (ref 65–99)
GLUCOSE UR STRIP-MCNC: NEGATIVE MG/DL
HBA1C MFR BLD: 5.75 % (ref 4.8–5.6)
HCT VFR BLD AUTO: 38.3 % (ref 34–46.6)
HGB BLD-MCNC: 13.5 G/DL (ref 12–15.9)
HGB UR QL STRIP.AUTO: NEGATIVE
KETONES UR QL STRIP: NEGATIVE
LEUKOCYTE ESTERASE UR QL STRIP.AUTO: NEGATIVE
MCH RBC QN AUTO: 30.6 PG (ref 26.6–33)
MCHC RBC AUTO-ENTMCNC: 35.2 G/DL (ref 31.5–35.7)
MCV RBC AUTO: 86.8 FL (ref 79–97)
NITRITE UR QL STRIP: NEGATIVE
PH UR STRIP.AUTO: <=5 [PH] (ref 5–8)
PLATELET # BLD AUTO: 283 10*3/MM3 (ref 140–450)
PMV BLD AUTO: 11 FL (ref 6–12)
POTASSIUM SERPL-SCNC: 3.6 MMOL/L (ref 3.5–5.2)
PROT SERPL-MCNC: 7 G/DL (ref 6–8.5)
PROT UR QL STRIP: ABNORMAL
RBC # BLD AUTO: 4.41 10*6/MM3 (ref 3.77–5.28)
SODIUM SERPL-SCNC: 145 MMOL/L (ref 136–145)
SP GR UR STRIP: 1.03 (ref 1–1.03)
T3FREE SERPL-MCNC: 2.81 PG/ML (ref 2–4.4)
T4 FREE SERPL-MCNC: 1.22 NG/DL (ref 0.93–1.7)
TSH SERPL DL<=0.05 MIU/L-ACNC: 0.78 UIU/ML (ref 0.27–4.2)
UROBILINOGEN UR QL STRIP: ABNORMAL
WBC # BLD AUTO: 6.85 10*3/MM3 (ref 3.4–10.8)

## 2020-11-03 PROCEDURE — 81003 URINALYSIS AUTO W/O SCOPE: CPT | Performed by: INTERNAL MEDICINE

## 2020-11-03 PROCEDURE — 82306 VITAMIN D 25 HYDROXY: CPT | Performed by: INTERNAL MEDICINE

## 2020-11-03 PROCEDURE — 85027 COMPLETE CBC AUTOMATED: CPT | Performed by: INTERNAL MEDICINE

## 2020-11-03 PROCEDURE — 36415 COLL VENOUS BLD VENIPUNCTURE: CPT | Performed by: INTERNAL MEDICINE

## 2020-11-03 PROCEDURE — 80053 COMPREHEN METABOLIC PANEL: CPT | Performed by: INTERNAL MEDICINE

## 2020-11-03 PROCEDURE — 84443 ASSAY THYROID STIM HORMONE: CPT | Performed by: INTERNAL MEDICINE

## 2020-11-03 PROCEDURE — 83036 HEMOGLOBIN GLYCOSYLATED A1C: CPT | Performed by: INTERNAL MEDICINE

## 2020-11-03 PROCEDURE — 84439 ASSAY OF FREE THYROXINE: CPT | Performed by: INTERNAL MEDICINE

## 2020-11-03 PROCEDURE — 80061 LIPID PANEL: CPT | Performed by: INTERNAL MEDICINE

## 2020-11-03 PROCEDURE — 84481 FREE ASSAY (FT-3): CPT | Performed by: INTERNAL MEDICINE

## 2020-11-03 PROCEDURE — 82550 ASSAY OF CK (CPK): CPT | Performed by: INTERNAL MEDICINE

## 2020-11-03 PROCEDURE — 83704 LIPOPROTEIN BLD QUAN PART: CPT | Performed by: INTERNAL MEDICINE

## 2020-11-06 LAB
CHOLEST SERPL-MCNC: 182 MG/DL (ref 100–199)
HDL SERPL-SCNC: 20.5 UMOL/L
HDLC SERPL-MCNC: 37 MG/DL
LDL SERPL QN: 21.4 NM
LDL SERPL-SCNC: 1615 NMOL/L
LDL SMALL SERPL-SCNC: 733 NMOL/L
LDLC SERPL CALC-MCNC: 112 MG/DL (ref 0–99)
TRIGL SERPL-MCNC: 189 MG/DL (ref 0–149)

## 2020-11-10 ENCOUNTER — OFFICE VISIT (OUTPATIENT)
Dept: INTERNAL MEDICINE | Facility: CLINIC | Age: 55
End: 2020-11-10

## 2020-11-10 VITALS
DIASTOLIC BLOOD PRESSURE: 66 MMHG | WEIGHT: 202.4 LBS | SYSTOLIC BLOOD PRESSURE: 114 MMHG | HEIGHT: 62 IN | BODY MASS INDEX: 37.25 KG/M2 | OXYGEN SATURATION: 98 % | HEART RATE: 85 BPM

## 2020-11-10 DIAGNOSIS — I10 BENIGN ESSENTIAL HYPERTENSION: Chronic | ICD-10-CM

## 2020-11-10 DIAGNOSIS — Z23 NEED FOR SHINGLES VACCINE: ICD-10-CM

## 2020-11-10 DIAGNOSIS — R73.01 IMPAIRED FASTING GLUCOSE: Chronic | ICD-10-CM

## 2020-11-10 DIAGNOSIS — I65.21 ATHEROSCLEROSIS OF RIGHT CAROTID ARTERY: Chronic | ICD-10-CM

## 2020-11-10 DIAGNOSIS — IMO0002 CHRONIC MIGRAINE: Chronic | ICD-10-CM

## 2020-11-10 DIAGNOSIS — Z23 NEED FOR INFLUENZA VACCINATION: ICD-10-CM

## 2020-11-10 DIAGNOSIS — Z00.00 ROUTINE PHYSICAL EXAMINATION: Primary | ICD-10-CM

## 2020-11-10 DIAGNOSIS — E55.9 VITAMIN D DEFICIENCY: Chronic | ICD-10-CM

## 2020-11-10 DIAGNOSIS — E66.01 MORBID OBESITY (HCC): Chronic | ICD-10-CM

## 2020-11-10 DIAGNOSIS — Z51.81 THERAPEUTIC DRUG MONITORING: ICD-10-CM

## 2020-11-10 DIAGNOSIS — F41.8 DEPRESSION WITH ANXIETY: Chronic | ICD-10-CM

## 2020-11-10 DIAGNOSIS — E78.2 MIXED HYPERLIPIDEMIA: ICD-10-CM

## 2020-11-10 DIAGNOSIS — F06.4 ANXIETY DISORDER DUE TO GENERAL MEDICAL CONDITION: Chronic | ICD-10-CM

## 2020-11-10 DIAGNOSIS — Z91.199 NONCOMPLIANCE: Chronic | ICD-10-CM

## 2020-11-10 PROBLEM — M75.22 BICIPITAL TENDINITIS OF LEFT SHOULDER: Status: RESOLVED | Noted: 2019-12-09 | Resolved: 2020-11-10

## 2020-11-10 PROBLEM — R47.1 DYSARTHRIA: Status: RESOLVED | Noted: 2019-12-09 | Resolved: 2020-11-10

## 2020-11-10 PROBLEM — R53.1 ACUTE LEFT-SIDED WEAKNESS: Status: RESOLVED | Noted: 2019-12-09 | Resolved: 2020-11-10

## 2020-11-10 PROCEDURE — 90471 IMMUNIZATION ADMIN: CPT | Performed by: INTERNAL MEDICINE

## 2020-11-10 PROCEDURE — 90750 HZV VACC RECOMBINANT IM: CPT | Performed by: INTERNAL MEDICINE

## 2020-11-10 PROCEDURE — 99396 PREV VISIT EST AGE 40-64: CPT | Performed by: INTERNAL MEDICINE

## 2020-11-10 RX ORDER — ALPRAZOLAM 0.5 MG/1
0.5 TABLET ORAL 2 TIMES DAILY PRN
Qty: 180 TABLET | Refills: 1 | Status: SHIPPED | OUTPATIENT
Start: 2020-11-10 | End: 2021-06-14 | Stop reason: SDUPTHER

## 2020-11-10 RX ORDER — PROPRANOLOL HYDROCHLORIDE 20 MG/1
TABLET ORAL
Qty: 270 TABLET | Refills: 1 | Status: SHIPPED | OUTPATIENT
Start: 2020-11-10 | End: 2021-05-23 | Stop reason: SDUPTHER

## 2020-11-10 RX ORDER — OLMESARTAN MEDOXOMIL AND HYDROCHLOROTHIAZIDE 40/25 40; 25 MG/1; MG/1
1 TABLET ORAL DAILY
Qty: 90 TABLET | Refills: 1 | Status: SHIPPED | OUTPATIENT
Start: 2020-11-10 | End: 2021-05-03 | Stop reason: SDUPTHER

## 2020-11-10 NOTE — PROGRESS NOTES
11/10/2020    Patient Information  Ruba Ohara                                                                                          [address] [facility] [address]      1965  [unfilled]  [phone] (work)    Chief Complaint:     Routine annual physical examination and follow-up lab work in order to monitor chronic medical issues listed in history of present illness.  No new acute complaints.    History of Present Illness:    Patient with a history of impaired fasting glucose, hypertension, hyperlipidemia, carotid artery plaque, migraine headaches, vitamin D deficiency, depression with anxiety and generalized anxiety, morbid obesity, history of noncompliance.  She presents today for routine annual physical exam and follow-up lab work.  Her past medical history reviewed and updated were necessary including health maintenance parameters.  This reveals she will be up to date or else accounted for after today's visit.    Review of Systems   Constitution: Negative.   HENT: Negative.    Eyes: Negative.    Cardiovascular: Negative.    Respiratory: Negative.    Endocrine: Negative.    Hematologic/Lymphatic: Negative.    Skin: Negative.    Musculoskeletal: Negative.    Gastrointestinal: Negative.    Genitourinary: Negative.    Neurological: Negative.    Psychiatric/Behavioral: Negative.    Allergic/Immunologic: Negative.        Active Problems:    Patient Active Problem List   Diagnosis   • Anxiety disorder due to general medical condition   • Benign essential hypertension   • Carotid artery plaque, 12/30/2019--negative MRA neck.  02/01/2018--16-49% bilateral.  10/29/2013--right ICA plaque.  Normal left ICA.   • Hirsutism   • Hyperlipidemia   • Migraine headaches   • Vitamin D deficiency   • Therapeutic drug monitoring   • Routine physical examination   • History of meningioma   • Depression with anxiety   • Morbid obesity (CMS/HCC)   • Impaired fasting glucose   • Noncompliance   •  Hypoactive sexual desire disorder         Past Medical History:   Diagnosis Date   • Anxiety disorder due to general medical condition 4/14/2016   • Benign essential hypertension 4/12/2006 04/12/2006--treatment for hypertension begun.   • Carotid artery plaque, 12/30/2019--negative MRA neck.  02/01/2018--16-49% bilateral.  10/29/2013--right ICA plaque.  Normal left ICA. 10/29/2013    December 30, 2019--MRA of the head and neck negative.  February 1, 2018--carotid Doppler study revealed 16-49% bilateral carotid artery plaque.  10/29/2013--carotid Doppler revealed plaque without stenosis in the right internal carotid artery. There was no evidence of hemodynamically significant stenosis in the left carotid system. Antegrade flow was present in the left vertebral artery.   • Depression with anxiety 4/26/2016   • Hirsutism 4/14/2016   • History of iron deficiency anemia 07/21/2015 07/21/2015--patient was admitted briefly to the hospital with a hemoglobin of 7.3. This was believed to be due to an episode of rectal bleeding, but primarily due to menorrhagia.   • History of meningioma 08/19/2014 08/19/2014--MRI again performed for left sided facial and arm numbness and tingling.  This reveals status post resection of a meningioma via a left temporoparietal craniotomy.  There is no evidence to suggest residual or recurrent tumor.  Again noted is a focus of encephalomalacia within the anterior portion of the left temporal lobe deep to the craniotomy flap.  10/24/2013--MRI of the brain perfo   • History of Seizure disorder, grand mal 4/14/2016    This was related to a benign brain tumor of the left temporal lobe that is believed to be a meningioma. Status post resection and no recurrence of the seizures.   • Hyperlipidemia 2/7/2012 02/07/2012--treatment for hyperlipidemia begun.   • Hypoactive sexual desire disorder 6/13/2019   • Impaired fasting glucose 4/29/2019 April 29, 2019--routine follow-up.  Fasting  serum glucose elevated at 113.  Recommend decreased carbohydrate diet, exercise, weight loss.   • Migraine headaches 10/22/2013    08/19/2014--MRI again performed for left sided facial and arm numbness and tingling.  This reveals status post resection of a meningioma via a left temporoparietal craniotomy.  There is no evidence to suggest residual or recurrent tumor.  Again noted is a focus of encephalomalacia within the anterior portion of the left temporal lobe deep to the craniotomy flap.  10/29/2013--echocardiogram performed for possible TIA symptoms revealed normal left ventricular size thickness and function. There were no significant valvular abnormalities.  10/24/2013--MRI performed for possible TIA. Prior large 10 cm lateral left temporoparietal craniotomy and an additional 3.5 cm mid to inferior left  temporal craniectomy  by history resection of a meningioma. There is encephalomalacia in anterior inferior left temporal lobe which tracks approximately 3 x 2.1 cm. The remainder of the brain parenchyma is normal in signal intensity. Ventricles are normal. No mass effect noted. No midline shift and no extra-axial fluid collecti   • Noncompliance 4/29/2019 April 29, 2019--this patient has been noncompliant with filling medications, obtaining diagnostic studies such as routine mammogram.   • Vitamin D deficiency 4/14/2016         Past Surgical History:   Procedure Laterality Date   • BRAIN MENINGIOMA EXCISION  26 years old    26 years of age--status post resection of benign brain tumor believed to be a meningioma. Located in the anterior aspect of the left temporal lobe. 10/24/2013--MRI of the brain performed for possible TIA revealed surgical changes of a lateral left temporal parietal craniotomy as well as mid to inferior squamosal left temporal craniotomy consistent with patient's previous surgery. There was a 3.5 x   • COLONOSCOPY  08/20/2015 08/20/2015--entirely normal screening colonoscopy.   •  LAPAROSCOPIC HYSTERECTOMY  2010 or so    due to abnormal bleeding due to fibroids, done at [facility], unable to recall physician   • TUBAL ABDOMINAL LIGATION  28 years old    28 years old--tubal ligation.         No Known Allergies        Current Outpatient Medications:   •  aspirin  MG EC tablet, Take  by mouth daily., Disp: , Rfl:   •  Cholecalciferol (VITAMIN D3) 5000 UNITS capsule capsule, 1 by mouth daily as directed, Disp: 30 capsule, Rfl: 11  •  olmesartan-hydrochlorothiazide (Benicar HCT) 40-25 MG per tablet, Take 1 tablet by mouth Daily for blood pressure., Disp: 90 tablet, Rfl: 1  •  propranolol (INDERAL) 20 MG tablet, Take 1 p.o. 3 times a day for migraine headaches, Disp: 270 tablet, Rfl: 1  •  ALPRAZolam (XANAX) 0.5 MG tablet, Take 1 p.o. twice daily as needed anxiety, Disp: 180 tablet, Rfl: 1      Family History   Problem Relation Age of Onset   • Throat cancer Father    • Stroke Father    • Hypertension Other         Benign Essential   • Stroke Brother    • Breast cancer Neg Hx    • Ovarian cancer Neg Hx    • Uterine cancer Neg Hx    • Colon cancer Neg Hx    • Pancreatic cancer Neg Hx          Social History     Socioeconomic History   • Marital status: Legally      Spouse name: Not on file   • Number of children: Not on file   • Years of education: Not on file   • Highest education level: Not on file   Occupational History   • Occupation:  for [employer]   Social Needs   • Financial resource strain: Not hard at all   • Food insecurity     Worry: Never true     Inability: Never true   • Transportation needs     Medical: No     Non-medical: No   Tobacco Use   • Smoking status: Never Smoker   • Smokeless tobacco: Never Used   Substance and Sexual Activity   • Alcohol use: Yes     Frequency: 2-4 times a month     Drinks per session: 1 or 2     Binge frequency: Never     Comment: Socially   • Drug use: No   • Sexual activity: Yes     Partners: Male     Birth control/protection:  "Surgical     Comment: hysterectomy    Lifestyle   • Physical activity     Days per week: 0 days     Minutes per session: 0 min   • Stress: Not at all   Relationships   • Social connections     Talks on phone: Patient refused     Gets together: Patient refused     Attends [employer] service: Patient refused     Active member of club or organization: Patient refused     Attends meetings of clubs or organizations: Patient refused     Relationship status: Patient refused         Vitals:    11/10/20 1525   BP: 114/66   BP Location: Left arm   Pulse: 85   SpO2: 98%   Weight: 91.8 kg (202 lb 6.4 oz)   Height: 157.5 cm (62.01\")        Body mass index is 37.01 kg/m².      Physical Exam:    General: Alert and oriented x 3.  No acute distress.  Normal affect.  Morbidly obese.  HEENT: Pupils equal, round, reactive to light; extraocular movements intact; sclerae nonicteric; pharynx, ear canals and TMs normal.  Neck: Without JVD, thyromegaly, bruit, or adenopathy.  Lungs: Clear to auscultation in all fields.  Heart: Regular rate and rhythm without murmur, rub, gallop, or click.  Abdomen: Soft, nontender, without hepatosplenomegaly or hernia.  Bowel sounds normal.  : Deferred.  Rectal: Deferred.  Extremities: Without clubbing, cyanosis, edema, or pulse deficit.  Neurologic: Intact without focal deficit.  Normal station and gait observed during ingress and egress from the examination room.  Skin: Without significant lesion.  Musculoskeletal: Unremarkable.    Lab/other results:    NMR reveals a total cholesterol of 182.  Triglycerides elevated at 189.  LDL particle number elevated at 1615.  Small LDL particle number elevated at 733.  HDL particle number is low at 20.5.  CMP normal except glucose 132, creatinine mildly elevated at 1.08.  ALT mildly elevated at 34.  CPK elevated at 302.  CBC is normal.  Vitamin D normal.  Urinalysis normal.  Thyroid function test normal.  Hemoglobin A1c 5.75.    Assessment/Plan:     Diagnosis Plan "   1. Routine physical examination     2. Impaired fasting glucose  Hemoglobin A1c   3. Benign essential hypertension  olmesartan-hydrochlorothiazide (Benicar HCT) 40-25 MG per tablet   4. Hyperlipidemia  Comprehensive Metabolic Panel    NMR LipoProfile    CK   5. Carotid artery plaque, 02/01/2018--16-49% bilateral.  10/29/2013--right ICA plaque.  Normal left ICA.     6. Migraine headaches  propranolol (INDERAL) 20 MG tablet   7. Vitamin D deficiency     8. Depression with anxiety  ALPRAZolam (XANAX) 0.5 MG tablet   9. Morbid obesity (CMS/HCC)     10. Noncompliance     11. Therapeutic drug monitoring  Comprehensive Metabolic Panel    Hemoglobin A1c    NMR LipoProfile    CK   12. Need for influenza vaccination     13. Anxiety disorder due to general medical condition  ALPRAZolam (XANAX) 0.5 MG tablet   14. Need for shingles vaccine  Shingrix Vaccine     Patient presents with essentially normal annual physical except for the following issues: She has mild impaired fasting glucose that does not require medication.  Strongly recommend diet and weight loss.  Blood pressure has been under good control.  She has hyperlipidemia which is really bad enough to warrant treatment with medication.  I am worried that compliance would be an issue.  She has carotid artery plaque and had a negative MRA of the head and neck less than 1 year ago.  Her migraine headaches seem to be controlled with propranolol.  Vitamin D is in normal range.  Her depression and anxiety is primarily anxiety and she takes Xanax as needed.  Patient suffers from morbid obesity and likely always will.    Plan is as follows: Strongly recommend low carbohydrate diet, weight loss, and exercise.  Refill medications as needed.  I will have her follow-up in 6 months with lab prior and if her lipid profile is not any better at that time with lifestyle changes, we will strongly consider statin therapy.  Influenza vaccine was given at work about 3 weeks  ago.      Procedures

## 2020-12-11 ENCOUNTER — TELEPHONE (OUTPATIENT)
Dept: INTERNAL MEDICINE | Facility: CLINIC | Age: 55
End: 2020-12-11

## 2020-12-11 NOTE — TELEPHONE ENCOUNTER
Caller: Ruba Ohara    Relationship: Self    Best call back number: [phone]    What test was performed: COVID    When was the test performed: 12/10/20    Additional notes: REQUESTING CALL BACK WITH RESULTS

## 2021-03-06 ENCOUNTER — IMMUNIZATION (OUTPATIENT)
Dept: VACCINE CLINIC | Facility: HOSPITAL | Age: 56
End: 2021-03-06

## 2021-03-06 PROCEDURE — 0001A: CPT | Performed by: INTERNAL MEDICINE

## 2021-03-06 PROCEDURE — 91300 HC SARSCOV02 VAC 30MCG/0.3ML IM: CPT | Performed by: INTERNAL MEDICINE

## 2021-03-27 ENCOUNTER — IMMUNIZATION (OUTPATIENT)
Dept: VACCINE CLINIC | Facility: HOSPITAL | Age: 56
End: 2021-03-27

## 2021-03-27 PROCEDURE — 0002A: CPT | Performed by: INTERNAL MEDICINE

## 2021-03-27 PROCEDURE — 91300 HC SARSCOV02 VAC 30MCG/0.3ML IM: CPT | Performed by: INTERNAL MEDICINE

## 2021-05-03 DIAGNOSIS — I10 BENIGN ESSENTIAL HYPERTENSION: Chronic | ICD-10-CM

## 2021-05-07 RX ORDER — OLMESARTAN MEDOXOMIL AND HYDROCHLOROTHIAZIDE 40/25 40; 25 MG/1; MG/1
1 TABLET ORAL DAILY
Qty: 90 TABLET | Refills: 1 | Status: SHIPPED | OUTPATIENT
Start: 2021-05-07 | End: 2021-11-19 | Stop reason: SDUPTHER

## 2021-05-23 DIAGNOSIS — IMO0002 CHRONIC MIGRAINE: Chronic | ICD-10-CM

## 2021-05-24 RX ORDER — PROPRANOLOL HYDROCHLORIDE 20 MG/1
TABLET ORAL
Qty: 270 TABLET | Refills: 1 | Status: SHIPPED | OUTPATIENT
Start: 2021-05-24 | End: 2022-10-06 | Stop reason: SDUPTHER

## 2021-06-14 ENCOUNTER — LAB (OUTPATIENT)
Dept: LAB | Facility: HOSPITAL | Age: 56
End: 2021-06-14

## 2021-06-14 ENCOUNTER — OFFICE VISIT (OUTPATIENT)
Dept: INTERNAL MEDICINE | Facility: CLINIC | Age: 56
End: 2021-06-14

## 2021-06-14 ENCOUNTER — HOSPITAL ENCOUNTER (OUTPATIENT)
Dept: CT IMAGING | Facility: HOSPITAL | Age: 56
Discharge: HOME | End: 2021-06-14
Admitting: FAMILY MEDICINE

## 2021-06-14 VITALS
BODY MASS INDEX: 37.91 KG/M2 | WEIGHT: 206 LBS | DIASTOLIC BLOOD PRESSURE: 78 MMHG | HEART RATE: 85 BPM | HEIGHT: 62 IN | SYSTOLIC BLOOD PRESSURE: 120 MMHG | TEMPERATURE: 98 F | OXYGEN SATURATION: 99 % | RESPIRATION RATE: 16 BRPM

## 2021-06-14 DIAGNOSIS — Z51.81 THERAPEUTIC DRUG MONITORING: ICD-10-CM

## 2021-06-14 DIAGNOSIS — IMO0002 CHRONIC MIGRAINE: ICD-10-CM

## 2021-06-14 DIAGNOSIS — R53.83 FATIGUE DUE TO DEPRESSION: ICD-10-CM

## 2021-06-14 DIAGNOSIS — F33.0 MAJOR DEPRESSIVE DISORDER, RECURRENT, MILD (HCC): ICD-10-CM

## 2021-06-14 DIAGNOSIS — Z86.018 HISTORY OF MENINGIOMA: ICD-10-CM

## 2021-06-14 DIAGNOSIS — F32.A FATIGUE DUE TO DEPRESSION: ICD-10-CM

## 2021-06-14 DIAGNOSIS — F01.518 VASCULAR DEMENTIA WITH BEHAVIOR DISTURBANCE (HCC): Primary | ICD-10-CM

## 2021-06-14 DIAGNOSIS — Z00.00 HEALTHCARE MAINTENANCE: ICD-10-CM

## 2021-06-14 DIAGNOSIS — R73.01 IMPAIRED FASTING GLUCOSE: Chronic | ICD-10-CM

## 2021-06-14 DIAGNOSIS — F06.4 ANXIETY DISORDER DUE TO GENERAL MEDICAL CONDITION: Chronic | ICD-10-CM

## 2021-06-14 DIAGNOSIS — E66.9 OBESITY (BMI 30-39.9): ICD-10-CM

## 2021-06-14 DIAGNOSIS — F41.8 DEPRESSION WITH ANXIETY: Chronic | ICD-10-CM

## 2021-06-14 DIAGNOSIS — F01.518 VASCULAR DEMENTIA WITH BEHAVIOR DISTURBANCE (HCC): ICD-10-CM

## 2021-06-14 DIAGNOSIS — E78.2 MIXED HYPERLIPIDEMIA: ICD-10-CM

## 2021-06-14 LAB
25(OH)D3 SERPL-MCNC: 71.2 NG/ML (ref 30–100)
ALBUMIN SERPL-MCNC: 4.5 G/DL (ref 3.5–5.2)
ALBUMIN/GLOB SERPL: 1.5 G/DL
ALP SERPL-CCNC: 87 U/L (ref 39–117)
ALT SERPL W P-5'-P-CCNC: 27 U/L (ref 1–33)
ANION GAP SERPL CALCULATED.3IONS-SCNC: 9.3 MMOL/L (ref 5–15)
AST SERPL-CCNC: 22 U/L (ref 1–32)
BILIRUB SERPL-MCNC: 0.3 MG/DL (ref 0–1.2)
BUN SERPL-MCNC: 13 MG/DL (ref 6–20)
BUN/CREAT SERPL: 13.3 (ref 7–25)
CALCIUM SPEC-SCNC: 9.5 MG/DL (ref 8.6–10.5)
CHLORIDE SERPL-SCNC: 101 MMOL/L (ref 98–107)
CK SERPL-CCNC: 235 U/L (ref 20–180)
CO2 SERPL-SCNC: 30.7 MMOL/L (ref 22–29)
CREAT SERPL-MCNC: 0.98 MG/DL (ref 0.57–1)
DEPRECATED RDW RBC AUTO: 45 FL (ref 37–54)
ERYTHROCYTE [DISTWIDTH] IN BLOOD BY AUTOMATED COUNT: 13.6 % (ref 12.3–15.4)
FOLATE SERPL-MCNC: >20 NG/ML (ref 4.78–24.2)
GFR SERPL CREATININE-BSD FRML MDRD: 71 ML/MIN/1.73
GLOBULIN UR ELPH-MCNC: 3 GM/DL
GLUCOSE SERPL-MCNC: 93 MG/DL (ref 65–99)
HBA1C MFR BLD: 5.89 % (ref 4.8–5.6)
HCT VFR BLD AUTO: 39.7 % (ref 34–46.6)
HGB BLD-MCNC: 13.4 G/DL (ref 12–15.9)
MAGNESIUM SERPL-MCNC: 2.2 MG/DL (ref 1.6–2.6)
MCH RBC QN AUTO: 30.4 PG (ref 26.6–33)
MCHC RBC AUTO-ENTMCNC: 33.8 G/DL (ref 31.5–35.7)
MCV RBC AUTO: 90 FL (ref 79–97)
PLATELET # BLD AUTO: 324 10*3/MM3 (ref 140–450)
PMV BLD AUTO: 10.7 FL (ref 6–12)
POTASSIUM SERPL-SCNC: 3.5 MMOL/L (ref 3.5–5.2)
PROT SERPL-MCNC: 7.5 G/DL (ref 6–8.5)
RBC # BLD AUTO: 4.41 10*6/MM3 (ref 3.77–5.28)
SODIUM SERPL-SCNC: 141 MMOL/L (ref 136–145)
VIT B12 BLD-MCNC: 568 PG/ML (ref 211–946)
WBC # BLD AUTO: 7.97 10*3/MM3 (ref 3.4–10.8)

## 2021-06-14 PROCEDURE — 25010000002 IOPAMIDOL 61 % SOLUTION: Performed by: FAMILY MEDICINE

## 2021-06-14 PROCEDURE — 99396 PREV VISIT EST AGE 40-64: CPT | Performed by: FAMILY MEDICINE

## 2021-06-14 PROCEDURE — 82306 VITAMIN D 25 HYDROXY: CPT | Performed by: FAMILY MEDICINE

## 2021-06-14 PROCEDURE — 80053 COMPREHEN METABOLIC PANEL: CPT

## 2021-06-14 PROCEDURE — 85027 COMPLETE CBC AUTOMATED: CPT | Performed by: FAMILY MEDICINE

## 2021-06-14 PROCEDURE — 82550 ASSAY OF CK (CPK): CPT

## 2021-06-14 PROCEDURE — 36415 COLL VENOUS BLD VENIPUNCTURE: CPT

## 2021-06-14 PROCEDURE — 83704 LIPOPROTEIN BLD QUAN PART: CPT

## 2021-06-14 PROCEDURE — 83036 HEMOGLOBIN GLYCOSYLATED A1C: CPT

## 2021-06-14 PROCEDURE — 70470 CT HEAD/BRAIN W/O & W/DYE: CPT

## 2021-06-14 PROCEDURE — 80061 LIPID PANEL: CPT

## 2021-06-14 PROCEDURE — 82746 ASSAY OF FOLIC ACID SERUM: CPT

## 2021-06-14 PROCEDURE — 82607 VITAMIN B-12: CPT

## 2021-06-14 PROCEDURE — 83735 ASSAY OF MAGNESIUM: CPT

## 2021-06-14 RX ORDER — HYDROXYZINE HYDROCHLORIDE 25 MG/1
25 TABLET, FILM COATED ORAL 2 TIMES DAILY
Qty: 60 TABLET | Refills: 8 | Status: SHIPPED | OUTPATIENT
Start: 2021-06-14 | End: 2021-06-15 | Stop reason: SDUPTHER

## 2021-06-14 RX ORDER — VENLAFAXINE HYDROCHLORIDE 37.5 MG/1
37.5 CAPSULE, EXTENDED RELEASE ORAL DAILY
Qty: 60 CAPSULE | Refills: 8 | Status: SHIPPED | OUTPATIENT
Start: 2021-06-14 | End: 2021-06-15 | Stop reason: SDUPTHER

## 2021-06-14 RX ORDER — ALPRAZOLAM 0.5 MG/1
0.5 TABLET ORAL NIGHTLY PRN
Qty: 7 TABLET | Refills: 0 | Status: SHIPPED | OUTPATIENT
Start: 2021-06-14 | End: 2021-06-15 | Stop reason: SDUPTHER

## 2021-06-14 RX ORDER — ATORVASTATIN CALCIUM 80 MG/1
80 TABLET, FILM COATED ORAL DAILY
Qty: 90 TABLET | Refills: 4 | Status: SHIPPED | OUTPATIENT
Start: 2021-06-14 | End: 2021-10-06

## 2021-06-14 RX ADMIN — IOPAMIDOL 100 ML: 612 INJECTION, SOLUTION INTRAVENOUS at 12:30

## 2021-06-14 NOTE — PROGRESS NOTES
06/14/2021    Patient Information  Ruba Ohara                                                                                          [address]  [facility] [address]      Chief Complaint:   Chief Complaint   Patient presents with   • Memory Loss     About a month           History of Present Illness:  Pt is here for memory probs, [id number], worsening these past 2 months.  Pt repots last week she got lsot on her way to usual grocery store. She endorse concentraitn difficulty both at home and at work. she endorses multiple mistakes being made while at work.  She reports of symptoms of depression x 1 yr. She also endorses chronic HA which occurs daily. Her last w/u for HAs was in 2019 which was neg. She endorses some blurry vision b/l which is being managed by ophtho.     Requesting refills. No side effects reported. No further complaints/concerns.    Review of Systems   Constitutional: Positive for malaise/fatigue.   HENT: Negative.    Eyes: Negative.    Cardiovascular: Negative.    Respiratory: Negative.    Endocrine: Negative.    Hematologic/Lymphatic: Negative.    Skin: Negative.    Musculoskeletal: Negative.    Gastrointestinal: Negative.    Genitourinary: Negative.    Neurological: Negative.    Psychiatric/Behavioral: Positive for depression and memory loss. Negative for suicidal ideas. The patient is nervous/anxious.    Allergic/Immunologic: Negative.        Active Problems:    Patient Active Problem List   Diagnosis   • Anxiety disorder due to general medical condition   • Benign essential hypertension   • Carotid artery plaque, 12/30/2019--negative MRA neck.  02/01/2018--16-49% bilateral.  10/29/2013--right ICA plaque.  Normal left ICA.   • Hirsutism   • Hyperlipidemia   • Migraine headaches   • Vitamin D deficiency   • Therapeutic drug monitoring   • Routine physical examination   • History of meningioma   • Depression with anxiety   • Morbid obesity (CMS/HCC)   • Impaired  fasting glucose   • Noncompliance   • Hypoactive sexual desire disorder         Past Medical History:   Diagnosis Date   • Anxiety disorder due to general medical condition 4/14/2016   • Benign essential hypertension 4/12/2006 04/12/2006--treatment for hypertension begun.   • Carotid artery plaque, 12/30/2019--negative MRA neck.  02/01/2018--16-49% bilateral.  10/29/2013--right ICA plaque.  Normal left ICA. 10/29/2013    December 30, 2019--MRA of the head and neck negative.  February 1, 2018--carotid Doppler study revealed 16-49% bilateral carotid artery plaque.  10/29/2013--carotid Doppler revealed plaque without stenosis in the right internal carotid artery. There was no evidence of hemodynamically significant stenosis in the left carotid system. Antegrade flow was present in the left vertebral artery.   • Depression with anxiety 4/26/2016   • Hirsutism 4/14/2016   • History of iron deficiency anemia 07/21/2015 07/21/2015--patient was admitted briefly to the hospital with a hemoglobin of 7.3. This was believed to be due to an episode of rectal bleeding, but primarily due to menorrhagia.   • History of meningioma 08/19/2014 08/19/2014--MRI again performed for left sided facial and arm numbness and tingling.  This reveals status post resection of a meningioma via a left temporoparietal craniotomy.  There is no evidence to suggest residual or recurrent tumor.  Again noted is a focus of encephalomalacia within the anterior portion of the left temporal lobe deep to the craniotomy flap.  10/24/2013--MRI of the brain perfo   • History of Seizure disorder, grand mal 4/14/2016    This was related to a benign brain tumor of the left temporal lobe that is believed to be a meningioma. Status post resection and no recurrence of the seizures.   • Hyperlipidemia 2/7/2012 02/07/2012--treatment for hyperlipidemia begun.   • Hypoactive sexual desire disorder 6/13/2019   • Impaired fasting glucose 4/29/2019 April 29,  2019--routine follow-up.  Fasting serum glucose elevated at 113.  Recommend decreased carbohydrate diet, exercise, weight loss.   • Migraine headaches 10/22/2013    08/19/2014--MRI again performed for left sided facial and arm numbness and tingling.  This reveals status post resection of a meningioma via a left temporoparietal craniotomy.  There is no evidence to suggest residual or recurrent tumor.  Again noted is a focus of encephalomalacia within the anterior portion of the left temporal lobe deep to the craniotomy flap.  10/29/2013--echocardiogram performed for possible TIA symptoms revealed normal left ventricular size thickness and function. There were no significant valvular abnormalities.  10/24/2013--MRI performed for possible TIA. Prior large 10 cm lateral left temporoparietal craniotomy and an additional 3.5 cm mid to inferior left  temporal craniectomy  by history resection of a meningioma. There is encephalomalacia in anterior inferior left temporal lobe which tracks approximately 3 x 2.1 cm. The remainder of the brain parenchyma is normal in signal intensity. Ventricles are normal. No mass effect noted. No midline shift and no extra-axial fluid collecti   • Noncompliance 4/29/2019 April 29, 2019--this patient has been noncompliant with filling medications, obtaining diagnostic studies such as routine mammogram.   • Vitamin D deficiency 4/14/2016         Past Surgical History:   Procedure Laterality Date   • BRAIN MENINGIOMA EXCISION  26 years old    26 years of age--status post resection of benign brain tumor believed to be a meningioma. Located in the anterior aspect of the left temporal lobe. 10/24/2013--MRI of the brain performed for possible TIA revealed surgical changes of a lateral left temporal parietal craniotomy as well as mid to inferior squamosal left temporal craniotomy consistent with patient's previous surgery. There was a 3.5 x   • COLONOSCOPY  08/20/2015 08/20/2015--entirely  normal screening colonoscopy.   • LAPAROSCOPIC HYSTERECTOMY  2010 or so    due to abnormal bleeding due to fibroids, done at [facility], unable to recall physician   • TUBAL ABDOMINAL LIGATION  28 years old    28 years old--tubal ligation.         No Known Allergies        Current Outpatient Medications:   •  ALPRAZolam (XANAX) 0.5 MG tablet, Take 1 tablet by mouth At Night As Needed for Anxiety. 1/2 tab once daily x3days, then 1/2 tab every other day x3 days, then 1/2 tab every 2 days, Disp: 7 tablet, Rfl: 0  •  aspirin  MG EC tablet, Take  by mouth daily., Disp: , Rfl:   •  Cholecalciferol (VITAMIN D3) 5000 UNITS capsule capsule, 1 by mouth daily as directed, Disp: 30 capsule, Rfl: 11  •  olmesartan-hydrochlorothiazide (Benicar HCT) 40-25 MG per tablet, Take 1 tablet by mouth Daily for blood pressure., Disp: 90 tablet, Rfl: 1  •  propranolol (INDERAL) 20 MG tablet, Take 1 tablet by mouth 3 times a day for migraine headaches, Disp: 270 tablet, Rfl: 1  •  atorvastatin (Lipitor) 80 MG tablet, Take 1 tablet by mouth Daily., Disp: 90 tablet, Rfl: 4  •  hydrOXYzine (ATARAX) 25 MG tablet, Take 1 tablet by mouth 2 (Two) Times a Day., Disp: 60 tablet, Rfl: 8  •  venlafaxine XR (Effexor XR) 37.5 MG 24 hr capsule, Take 1 capsule by mouth Daily., Disp: 60 capsule, Rfl: 8      Family History   Problem Relation Age of Onset   • Throat cancer Father    • Stroke Father    • Hypertension Other         Benign Essential   • Stroke Brother    • Breast cancer Neg Hx    • Ovarian cancer Neg Hx    • Uterine cancer Neg Hx    • Colon cancer Neg Hx    • Pancreatic cancer Neg Hx          Social History     Socioeconomic History   • Marital status: Legally      Spouse name: Not on file   • Number of children: Not on file   • Years of education: Not on file   • Highest education level: Not on file   Tobacco Use   • Smoking status: Never Smoker   • Smokeless tobacco: Never Used   Vaping Use   • Vaping Use: Never used   Substance  "and Sexual Activity   • Alcohol use: Yes     Comment: Socially   • Drug use: No   • Sexual activity: Yes     Partners: Male     Birth control/protection: Surgical     Comment: hysterectomy          Physical Exam  Constitutional:       Appearance: Normal appearance. She is obese.   Cardiovascular:      Rate and Rhythm: Normal rate and regular rhythm.      Pulses: Normal pulses.      Heart sounds: Normal heart sounds.   Pulmonary:      Effort: Pulmonary effort is normal.      Breath sounds: Normal breath sounds.   Skin:     General: Skin is warm.      Findings: No rash.   Neurological:      General: No focal deficit present.      Mental Status: She is alert and oriented to person, place, and time. Mental status is at baseline.   Psychiatric:         Judgment: Judgment normal.      Comments: Pt is tearful, sad mood          Vitals:    06/14/21 0753   BP: 120/78   Pulse: 85   Resp: 16   Temp: 98 °F (36.7 °C)   SpO2: 99%   Weight: 93.4 kg (206 lb)   Height: 157.5 cm (62\")       Body mass index is 37.68 kg/m².       Lab/other results:        Assessment/Plan:    Diagnoses and all orders for this visit:    1. Vascular dementia with behavior disturbance (CMS/HCC) (Primary)  -     Vitamin B12; Future  -     Folate; Future  -     Vitamin D 25 Hydroxy  -     CBC (No Diff)  -     CT head w wo contrast; Future    2. Chronic migraine  -     Vitamin B12; Future  -     Folate; Future  -     Vitamin D 25 Hydroxy  -     CBC (No Diff)  -     CT head w wo contrast; Future    3. History of meningioma  -     CT head w wo contrast; Future    4. Fatigue due to depression  -     Magnesium; Future    5. Major depressive disorder, recurrent, mild (CMS/HCC)    6. Healthcare maintenance  -     Mammo Screening Bilateral With CAD    7. Depression with anxiety  -     ALPRAZolam (XANAX) 0.5 MG tablet; Take 1 tablet by mouth At Night As Needed for Anxiety. 1/2 tab once daily x3days, then 1/2 tab every other day x3 days, then 1/2 tab every 2 days  " "Dispense: 7 tablet; Refill: 0    8. Anxiety disorder due to general medical condition  -     ALPRAZolam (XANAX) 0.5 MG tablet; Take 1 tablet by mouth At Night As Needed for Anxiety. 1/2 tab once daily x3days, then 1/2 tab every other day x3 days, then 1/2 tab every 2 days  Dispense: 7 tablet; Refill: 0    9. Obesity (BMI 30-39.9)    Other orders  -     hydrOXYzine (ATARAX) 25 MG tablet; Take 1 tablet by mouth 2 (Two) Times a Day.  Dispense: 60 tablet; Refill: 8  -     venlafaxine XR (Effexor XR) 37.5 MG 24 hr capsule; Take 1 capsule by mouth Daily.  Dispense: 60 capsule; Refill: 8  -     atorvastatin (Lipitor) 80 MG tablet; Take 1 tablet by mouth Daily.  Dispense: 90 tablet; Refill: 4         HM--f/u mammo. Vaccines utd. colonoscopy utd.    Memory probs--most likely due to vascular dementia vs. Pseudodementia due to depression, r/o malignancy due to pt's hx of meningioma--f/u CT head as pt has hx of meningioma, start back on lipitor max dose as pt has hx of cva, f/u labs to r/o other organic causes.    PAULA/MDD--start effexor taper up as tolerated, start hydroxyzine, taper off xanax which could be contributing to severe memory issues, f/u in 1 month.    Fatigue--f/u labs, most likely due to untreated depresison.     obesity--pt refusing any medical help at this point. she reports  \"I like my weight.\"  counseled on healthy diet / exercise. Monitor weights.     Procedures          "

## 2021-06-15 ENCOUNTER — TELEPHONE (OUTPATIENT)
Dept: INTERNAL MEDICINE | Facility: CLINIC | Age: 56
End: 2021-06-15

## 2021-06-15 ENCOUNTER — OFFICE VISIT (OUTPATIENT)
Dept: INTERNAL MEDICINE | Facility: CLINIC | Age: 56
End: 2021-06-15

## 2021-06-15 VITALS
TEMPERATURE: 98 F | RESPIRATION RATE: 16 BRPM | BODY MASS INDEX: 37.1 KG/M2 | HEIGHT: 62 IN | SYSTOLIC BLOOD PRESSURE: 130 MMHG | DIASTOLIC BLOOD PRESSURE: 78 MMHG | WEIGHT: 201.6 LBS | HEART RATE: 76 BPM | OXYGEN SATURATION: 99 %

## 2021-06-15 DIAGNOSIS — R41.82 ACUTE ON CHRONIC ALTERATION IN MENTAL STATUS: ICD-10-CM

## 2021-06-15 DIAGNOSIS — R41.3 MEMORY LOSS: Primary | ICD-10-CM

## 2021-06-15 DIAGNOSIS — F06.4 ANXIETY DISORDER DUE TO GENERAL MEDICAL CONDITION: Chronic | ICD-10-CM

## 2021-06-15 DIAGNOSIS — IMO0002 CHRONIC MIGRAINE: Chronic | ICD-10-CM

## 2021-06-15 DIAGNOSIS — F41.8 DEPRESSION WITH ANXIETY: Chronic | ICD-10-CM

## 2021-06-15 DIAGNOSIS — Z86.018 HISTORY OF MENINGIOMA: Chronic | ICD-10-CM

## 2021-06-15 PROBLEM — Z91.199 NONCOMPLIANCE: Chronic | Status: RESOLVED | Noted: 2019-04-29 | Resolved: 2021-06-15

## 2021-06-15 PROCEDURE — 99215 OFFICE O/P EST HI 40 MIN: CPT | Performed by: INTERNAL MEDICINE

## 2021-06-15 RX ORDER — CITALOPRAM 20 MG/1
20 TABLET ORAL NIGHTLY PRN
Qty: 30 TABLET | Refills: 6 | Status: SHIPPED | OUTPATIENT
Start: 2021-06-15 | End: 2021-10-06

## 2021-06-15 RX ORDER — HYDROXYZINE HYDROCHLORIDE 25 MG/1
25 TABLET, FILM COATED ORAL 2 TIMES DAILY
Qty: 60 TABLET | Refills: 8 | Status: SHIPPED | OUTPATIENT
Start: 2021-06-15 | End: 2021-06-15

## 2021-06-15 RX ORDER — VENLAFAXINE HYDROCHLORIDE 37.5 MG/1
37.5 CAPSULE, EXTENDED RELEASE ORAL DAILY
Qty: 60 CAPSULE | Refills: 8 | Status: SHIPPED | OUTPATIENT
Start: 2021-06-15 | End: 2021-06-15

## 2021-06-15 RX ORDER — ALPRAZOLAM 0.5 MG/1
.25-.5 TABLET ORAL 2 TIMES DAILY PRN
Qty: 60 TABLET | Refills: 3 | Status: SHIPPED | OUTPATIENT
Start: 2021-06-15 | End: 2022-09-26 | Stop reason: SDUPTHER

## 2021-06-15 RX ORDER — ALPRAZOLAM 0.5 MG/1
0.5 TABLET ORAL NIGHTLY PRN
Qty: 7 TABLET | Refills: 0 | Status: SHIPPED | OUTPATIENT
Start: 2021-06-15 | End: 2021-06-15

## 2021-06-15 NOTE — PROGRESS NOTES
06/15/2021    Patient Information  Ruba Ohara                                                                                          [address]  [facility] [address]      1965  [unfilled]  [phone] (work)    Chief Complaint:     Complaining of memory loss, intermittent/episodic confusion, worsening anxiety and depressive symptoms.    History of Present Illness:    Patient with a history of anxiety disorder and depression with anxiety, carotid artery plaque, hypertension, history of meningioma that was resected, history of headaches, impaired fasting glucose and hyperlipidemia.  She presents today with complaints of memory loss, intermittent confusion such as getting lost, worsening depression and anxiety associated with headaches.    The history regarding memory loss and intermittent confusion along with depression and anxiety:    Toña 15, 2021--patient reports at least a 6-month history of intermittent and fluctuating loss of memory/recall and occasionally she gets confused such as lost while driving.  She reports she has been stuttering a lot but does not really have any new neurologic symptoms or focal deficits.  She has a history of migraine headaches and takes Inderal for this and this is helped but not totally eliminated her headaches.  She saw one of my partners yesterday regarding this and reports she felt like she did absolutely nothing to help her.  Lab work was ordered which was drawn yesterday.  Patient was started on Effexor Exar 37.5 mg daily along with hydroxyzine 25 mg twice daily and instructions to start weaning Xanax which she has been on about 2-1/2 years.  Patient reports she is under a lot of stress.  I would not go into details but there is some difficulties at work along with difficulties with family.  The situations are the worst that they have ever been.  Plan is as follows: Discontinue Effexor extended release.  Note that patient has not picked  up this prescription and I will send a message to the pharmacy to cancel it.  Start citalopram 10 mg/day for the first week to 10 days and then increase to 20 mg/day.  Do not start weaning Xanax.  Change Xanax to 0.5 mg, 1/2-1 p.o. twice daily.  Discontinue Atarax prescription.  Discontinue order for CT scan of the brain and instead order MRI of the brain.  Neuropsychological testing ordered.  I do not feel that patient is capable to fulfill the duties of her work at the present time and I think she needs to be off for a 2-week duration initially until we can try to get her issues under control.  She may need light duty in the future for a while as well.  Psychotherapy is also a consideration and at the present time I am strongly recommending this.  However, we will hold off until we get the neuropsychological evaluation back.  It may be that someone in that office can do therapy for her as well.  I will have her follow-up in 2 weeks to reassess the situation and determine whether or not she is fit to go back to work.  Work note given.    Review of Systems   Constitutional: Negative.   Eyes: Negative.    Cardiovascular: Negative.    Respiratory: Negative.    Endocrine: Negative.    Hematologic/Lymphatic: Negative.    Skin: Negative.    Musculoskeletal: Negative.    Gastrointestinal: Negative.    Genitourinary: Negative.    Neurological: Positive for difficulty with concentration and headaches. Negative for aphonia, brief paralysis, focal weakness, light-headedness, loss of balance, numbness, paresthesias, seizures, sensory change, tremors, vertigo and weakness.   Psychiatric/Behavioral: Positive for depression and memory loss. Negative for hallucinations, substance abuse, suicidal ideas and thoughts of violence. The patient has insomnia and is nervous/anxious.    Allergic/Immunologic: Negative.        Active Problems:    Patient Active Problem List   Diagnosis   • Anxiety disorder due to general medical condition    • Benign essential hypertension   • Carotid artery plaque, 12/30/2019--negative MRA neck.  02/01/2018--16-49% bilateral.  10/29/2013--right ICA plaque.  Normal left ICA.   • Hirsutism   • Hyperlipidemia   • Migraine headaches   • Vitamin D deficiency   • Therapeutic drug monitoring   • Routine physical examination   • History of meningioma   • Depression with anxiety   • Morbid obesity (CMS/HCC)   • Impaired fasting glucose   • Hypoactive sexual desire disorder   • Memory loss   • Acute on chronic alteration in mental status         Past Medical History:   Diagnosis Date   • Anxiety disorder due to general medical condition 4/14/2016   • Benign essential hypertension 4/12/2006 04/12/2006--treatment for hypertension begun.   • Carotid artery plaque, 12/30/2019--negative MRA neck.  02/01/2018--16-49% bilateral.  10/29/2013--right ICA plaque.  Normal left ICA. 10/29/2013    December 30, 2019--MRA of the head and neck negative.  February 1, 2018--carotid Doppler study revealed 16-49% bilateral carotid artery plaque.  10/29/2013--carotid Doppler revealed plaque without stenosis in the right internal carotid artery. There was no evidence of hemodynamically significant stenosis in the left carotid system. Antegrade flow was present in the left vertebral artery.   • Depression with anxiety 4/26/2016   • Hirsutism 4/14/2016   • History of iron deficiency anemia 07/21/2015 07/21/2015--patient was admitted briefly to the hospital with a hemoglobin of 7.3. This was believed to be due to an episode of rectal bleeding, but primarily due to menorrhagia.   • History of meningioma 08/19/2014 08/19/2014--MRI again performed for left sided facial and arm numbness and tingling.  This reveals status post resection of a meningioma via a left temporoparietal craniotomy.  There is no evidence to suggest residual or recurrent tumor.  Again noted is a focus of encephalomalacia within the anterior portion of the left temporal lobe  deep to the craniotomy flap.  10/24/2013--MRI of the brain perfo   • History of Seizure disorder, grand mal 4/14/2016    This was related to a benign brain tumor of the left temporal lobe that is believed to be a meningioma. Status post resection and no recurrence of the seizures.   • Hyperlipidemia 2/7/2012 02/07/2012--treatment for hyperlipidemia begun.   • Hypoactive sexual desire disorder 6/13/2019   • Impaired fasting glucose 4/29/2019 April 29, 2019--routine follow-up.  Fasting serum glucose elevated at 113.  Recommend decreased carbohydrate diet, exercise, weight loss.   • Migraine headaches 10/22/2013    08/19/2014--MRI again performed for left sided facial and arm numbness and tingling.  This reveals status post resection of a meningioma via a left temporoparietal craniotomy.  There is no evidence to suggest residual or recurrent tumor.  Again noted is a focus of encephalomalacia within the anterior portion of the left temporal lobe deep to the craniotomy flap.  10/29/2013--echocardiogram performed for possible TIA symptoms revealed normal left ventricular size thickness and function. There were no significant valvular abnormalities.  10/24/2013--MRI performed for possible TIA. Prior large 10 cm lateral left temporoparietal craniotomy and an additional 3.5 cm mid to inferior left  temporal craniectomy  by history resection of a meningioma. There is encephalomalacia in anterior inferior left temporal lobe which tracks approximately 3 x 2.1 cm. The remainder of the brain parenchyma is normal in signal intensity. Ventricles are normal. No mass effect noted. No midline shift and no extra-axial fluid collecti   • Vitamin D deficiency 4/14/2016         Past Surgical History:   Procedure Laterality Date   • BRAIN MENINGIOMA EXCISION  26 years old    26 years of age--status post resection of benign brain tumor believed to be a meningioma. Located in the anterior aspect of the left temporal lobe.  10/24/2013--MRI of the brain performed for possible TIA revealed surgical changes of a lateral left temporal parietal craniotomy as well as mid to inferior squamosal left temporal craniotomy consistent with patient's previous surgery. There was a 3.5 x   • COLONOSCOPY  08/20/2015 08/20/2015--entirely normal screening colonoscopy.   • LAPAROSCOPIC HYSTERECTOMY  2010 or so    due to abnormal bleeding due to fibroids, done at [facility], unable to recall physician   • TUBAL ABDOMINAL LIGATION  28 years old    28 years old--tubal ligation.         No Known Allergies        Current Outpatient Medications:   •  aspirin  MG EC tablet, Take  by mouth daily., Disp: , Rfl:   •  atorvastatin (Lipitor) 80 MG tablet, Take 1 tablet by mouth Daily., Disp: 90 tablet, Rfl: 4  •  Cholecalciferol (VITAMIN D3) 5000 UNITS capsule capsule, 1 by mouth daily as directed, Disp: 30 capsule, Rfl: 11  •  olmesartan-hydrochlorothiazide (Benicar HCT) 40-25 MG per tablet, Take 1 tablet by mouth Daily for blood pressure., Disp: 90 tablet, Rfl: 1  •  propranolol (INDERAL) 20 MG tablet, Take 1 tablet by mouth 3 times a day for migraine headaches, Disp: 270 tablet, Rfl: 1  •  ALPRAZolam (XANAX) 0.5 MG tablet, Take 1/2-1 p.o. twice daily as needed for anxiety, Disp: 60 tablet, Rfl: 3  •  citalopram (CeleXA) 20 MG tablet, Take 1 p.o. nightly for depression and anxiety, Disp: 30 tablet, Rfl: 6  No current facility-administered medications for this visit.      Family History   Problem Relation Age of Onset   • Throat cancer Father    • Stroke Father    • Hypertension Other         Benign Essential   • Stroke Brother    • Breast cancer Neg Hx    • Ovarian cancer Neg Hx    • Uterine cancer Neg Hx    • Colon cancer Neg Hx    • Pancreatic cancer Neg Hx          Social History     Socioeconomic History   • Marital status: Legally      Spouse name: Not on file   • Number of children: Not on file   • Years of education: Not on file   • Highest  "education level: Not on file   Tobacco Use   • Smoking status: Never Smoker   • Smokeless tobacco: Never Used   Vaping Use   • Vaping Use: Never used   Substance and Sexual Activity   • Alcohol use: Yes     Comment: Socially   • Drug use: No   • Sexual activity: Yes     Partners: Male     Birth control/protection: Surgical     Comment: hysterectomy          Vitals:    06/15/21 1122   BP: 130/78   Pulse: 76   Resp: 16   Temp: 98 °F (36.7 °C)   SpO2: 99%   Weight: 91.4 kg (201 lb 9.6 oz)   Height: 157.5 cm (62\")        Body mass index is 36.87 kg/m².      Physical Exam:    General: Alert and oriented x 3.  No acute distress, but very anxious.  Somewhat blunted affect.  HEENT: Pupils equal, round, reactive to light; extraocular movements intact; sclerae nonicteric; pharynx, ear canals and TMs normal.  Neck: Without JVD, thyromegaly, bruit, or adenopathy.  Lungs: Clear to auscultation in all fields.  Heart: Regular rate and rhythm without murmur, rub, gallop, or click.  Abdomen: Soft, nontender, without hepatosplenomegaly or hernia.  Bowel sounds normal.  : Deferred.  Rectal: Deferred.  Extremities: Without clubbing, cyanosis, edema, or pulse deficit.  Neurologic: Intact without focal deficit.  Normal station and gait observed during ingress and egress from the examination room.  Skin: Without significant lesion.  Musculoskeletal: Unremarkable.    Lab/other results:      Assessment/Plan:     Diagnosis Plan   1. Memory loss  MRI Brain Without Contrast    NeuroPsych Testing   2. Acute on chronic alteration in mental status  MRI Brain Without Contrast    NeuroPsych Testing   3. Depression with anxiety  citalopram (CeleXA) 20 MG tablet    NeuroPsych Testing   4. Anxiety disorder due to general medical condition  ALPRAZolam (XANAX) 0.5 MG tablet    citalopram (CeleXA) 20 MG tablet    NeuroPsych Testing   5. History of meningioma  MRI Brain Without Contrast   6. Migraine headaches  MRI Brain Without Contrast     June 15, " 2021--patient reports at least a 6-month history of intermittent and fluctuating loss of memory/recall and occasionally she gets confused such as lost while driving.  She reports she has been stuttering a lot but does not really have any new neurologic symptoms or focal deficits.  She has a history of migraine headaches and takes Inderal for this and this is helped but not totally eliminated her headaches.  She saw one of my partners yesterday regarding this and reports she felt like she did absolutely nothing to help her.  Lab work was ordered which was drawn yesterday.  Patient was started on Effexor Exar 37.5 mg daily along with hydroxyzine 25 mg twice daily and instructions to start weaning Xanax which she has been on about 2-1/2 years.  Patient reports she is under a lot of stress.  I would not go into details but there is some difficulties at work along with difficulties with family.  The situations are the worst that they have ever been.     Plan is as follows: Discontinue Effexor extended release.  Note that patient has not picked up this prescription and I will send a message to the pharmacy to cancel it.  Start citalopram 10 mg/day for the first week to 10 days and then increase to 20 mg/day.  Do not start weaning Xanax.  Change Xanax to 0.5 mg, 1/2-1 p.o. twice daily.  Discontinue Atarax prescription.  Discontinue order for CT scan of the brain and instead order MRI of the brain.  Neuropsychological testing ordered.  I do not feel that patient is capable to fulfill the duties of her work at the present time and I think she needs to be off for a 2-week duration initially until we can try to get her issues under control.  She may need light duty in the future for a while as well.  Psychotherapy is also a consideration and at the present time I am strongly recommending this.  However, we will hold off until we get the neuropsychological evaluation back.  It may be that someone in that office can do therapy  for her as well.  I will have her follow-up in 2 weeks to reassess the situation and determine whether or not she is fit to go back to work.  Work note given.    Note: Greater than 40 minutes was spent today evaluating this patient with a serious acute on chronic issue and greater than 50% of this time was spent counseling this patient regarding this.  [id number] level service justified.    Procedures

## 2021-06-17 ENCOUNTER — APPOINTMENT (OUTPATIENT)
Dept: CT IMAGING | Facility: HOSPITAL | Age: 56
End: 2021-06-17

## 2021-06-17 ENCOUNTER — TELEPHONE (OUTPATIENT)
Dept: INTERNAL MEDICINE | Facility: CLINIC | Age: 56
End: 2021-06-17

## 2021-06-17 LAB
CHOLEST SERPL-MCNC: 201 MG/DL (ref 100–199)
HDL SERPL-SCNC: 24.6 UMOL/L
HDLC SERPL-MCNC: 39 MG/DL
LDL SERPL QN: 20.9 NM
LDL SERPL-SCNC: 1972 NMOL/L
LDL SMALL SERPL-SCNC: 965 NMOL/L
LDLC SERPL CALC-MCNC: 131 MG/DL (ref 0–99)
TRIGL SERPL-MCNC: 173 MG/DL (ref 0–149)

## 2021-06-17 NOTE — TELEPHONE ENCOUNTER
Caller: Ruba Ohara    Relationship: Self    Best call back number: [phone]    What form or medical record are you requesting: FMLA    Who is requesting this form or medical record from you: EMPLOYER    How would you like to receive the form or medical records (pick-up, mail, fax):     Timeframe paperwork needed: ASAP    Additional notes: PATIENTS PAPER WORK WAS FAXED ON 6/16/21. PLEASE REACH OUT IF IT WAS NOT RECIEVED

## 2021-06-25 ENCOUNTER — OFFICE VISIT (OUTPATIENT)
Dept: INTERNAL MEDICINE | Facility: CLINIC | Age: 56
End: 2021-06-25

## 2021-06-25 VITALS
DIASTOLIC BLOOD PRESSURE: 86 MMHG | WEIGHT: 193 LBS | HEART RATE: 87 BPM | RESPIRATION RATE: 16 BRPM | HEIGHT: 62 IN | SYSTOLIC BLOOD PRESSURE: 115 MMHG | BODY MASS INDEX: 35.51 KG/M2 | OXYGEN SATURATION: 96 % | TEMPERATURE: 98 F

## 2021-06-25 DIAGNOSIS — F06.4 ANXIETY DISORDER DUE TO GENERAL MEDICAL CONDITION: Chronic | ICD-10-CM

## 2021-06-25 DIAGNOSIS — F41.8 DEPRESSION WITH ANXIETY: Chronic | ICD-10-CM

## 2021-06-25 DIAGNOSIS — R41.3 MEMORY LOSS: ICD-10-CM

## 2021-06-25 DIAGNOSIS — R41.82 ACUTE ON CHRONIC ALTERATION IN MENTAL STATUS: Primary | ICD-10-CM

## 2021-06-25 PROCEDURE — 99214 OFFICE O/P EST MOD 30 MIN: CPT | Performed by: INTERNAL MEDICINE

## 2021-06-25 NOTE — PROGRESS NOTES
06/25/2021    Patient Information  Ruba Ohara                                                                                          [address]  [place] [address]      1965  [unfilled]  [phone] (work)    Chief Complaint:     Follow-up depression, anxiety, memory loss and acute on chronic alteration in mental status.  No new acute complaints.    History of Present Illness:     Patient with a history of generalized anxiety as well as depression with anxiety and recent deterioration of her mental health due to worsening depression and anxiety and associated with memory loss and alteration in mental status.  The history of this will be described below.    History regarding severe depression and anxiety symptoms, memory loss and alteration in mental status which is likely psychogenic is as follows:    June 25, 2021--patient seen in follow-up and is still obviously depressed and anxious.  She is on 20 mg of citalopram but it is too early to tell if this is more effective than the Effexor.  The Xanax helps with the anxiety as expected.  Patient is still clearly not able to go back to work at this time.  She has her neuropsychiatric evaluation appointment July 7.  The MRI of the brain is to be done July 2.  Plan is as follows: We will add Vraylar 1.25 mg/day.  Patient may increase this to 3 mg/day if after 2 weeks she feels that it is helping but not quite enough.  I explained to her that 1-1/2 to 3 mg his usual dose that should help as an adjunct to depression treatment in this situation.  She should stay on the citalopram and use the Xanax as needed.  I will have her follow-up July 14 which is previously scheduled.  Patient needs to stay off work until further notice.    Toña 15, 2021--patient reports at least a 6-month history of intermittent and fluctuating loss of memory/recall and occasionally she gets confused such as lost while driving.  She reports she has been  stuttering a lot but does not really have any new neurologic symptoms or focal deficits.  She has a history of migraine headaches and takes Inderal for this and this is helped but not totally eliminated her headaches.  She saw one of my partners yesterday regarding this and reports she felt like she did absolutely nothing to help her.  Lab work was ordered which was drawn yesterday.  Patient was started on Effexor Exar 37.5 mg daily along with hydroxyzine 25 mg twice daily and instructions to start weaning Xanax which she has been on about 2-1/2 years.  Patient reports she is under a lot of stress.  I would not go into details but there is some difficulties at work along with difficulties with family.  The situations are the worst that they have ever been.  Plan is as follows: Discontinue Effexor extended release.  Note that patient has not picked up this prescription and I will send a message to the pharmacy to cancel it.  Start citalopram 10 mg/day for the first week to 10 days and then increase to 20 mg/day.  Do not start weaning Xanax.  Change Xanax to 0.5 mg, 1/2-1 p.o. twice daily.  Discontinue Atarax prescription.  Discontinue order for CT scan of the brain and instead order MRI of the brain.  Neuropsychological testing ordered.  I do not feel that patient is capable to fulfill the duties of her work at the present time and I think she needs to be off for a 2-week duration initially until we can try to get her issues under control.  She may need light duty in the future for a while as well.  Psychotherapy is also a consideration and at the present time I am strongly recommending this.  However, we will hold off until we get the neuropsychological evaluation back.  It may be that someone in that office can do therapy for her as well.  I will have her follow-up in 2 weeks to reassess the situation and determine whether or not she is fit to go back to work.  Work note given.    Review of Systems    Constitutional: Negative.   HENT: Negative.    Eyes: Negative.    Cardiovascular: Negative.    Respiratory: Negative.    Endocrine: Negative.    Hematologic/Lymphatic: Negative.    Skin: Negative.    Musculoskeletal: Negative.    Gastrointestinal: Negative.    Genitourinary: Negative.    Neurological: Negative.    Psychiatric/Behavioral: Positive for depression and memory loss. The patient has insomnia.    Allergic/Immunologic: Negative.        Active Problems:    Patient Active Problem List   Diagnosis   • Anxiety disorder due to general medical condition   • Benign essential hypertension   • Carotid artery plaque, 12/30/2019--negative MRA neck.  02/01/2018--16-49% bilateral.  10/29/2013--right ICA plaque.  Normal left ICA.   • Hirsutism   • Hyperlipidemia   • Migraine headaches   • Vitamin D deficiency   • Therapeutic drug monitoring   • Routine physical examination   • History of meningioma   • Depression with anxiety   • Morbid obesity (CMS/HCC)   • Impaired fasting glucose   • Hypoactive sexual desire disorder   • Memory loss   • Acute on chronic alteration in mental status         Past Medical History:   Diagnosis Date   • Anxiety disorder due to general medical condition 4/14/2016   • Benign essential hypertension 4/12/2006 04/12/2006--treatment for hypertension begun.   • Carotid artery plaque, 12/30/2019--negative MRA neck.  02/01/2018--16-49% bilateral.  10/29/2013--right ICA plaque.  Normal left ICA. 10/29/2013    December 30, 2019--MRA of the head and neck negative.  February 1, 2018--carotid Doppler study revealed 16-49% bilateral carotid artery plaque.  10/29/2013--carotid Doppler revealed plaque without stenosis in the right internal carotid artery. There was no evidence of hemodynamically significant stenosis in the left carotid system. Antegrade flow was present in the left vertebral artery.   • Depression with anxiety 4/26/2016   • Hirsutism 4/14/2016   • History of iron deficiency anemia  07/21/2015 07/21/2015--patient was admitted briefly to the hospital with a hemoglobin of 7.3. This was believed to be due to an episode of rectal bleeding, but primarily due to menorrhagia.   • History of meningioma 08/19/2014 08/19/2014--MRI again performed for left sided facial and arm numbness and tingling.  This reveals status post resection of a meningioma via a left temporoparietal craniotomy.  There is no evidence to suggest residual or recurrent tumor.  Again noted is a focus of encephalomalacia within the anterior portion of the left temporal lobe deep to the craniotomy flap.  10/24/2013--MRI of the brain perfo   • History of Seizure disorder, grand mal 4/14/2016    This was related to a benign brain tumor of the left temporal lobe that is believed to be a meningioma. Status post resection and no recurrence of the seizures.   • Hyperlipidemia 2/7/2012 02/07/2012--treatment for hyperlipidemia begun.   • Hypoactive sexual desire disorder 6/13/2019   • Impaired fasting glucose 4/29/2019 April 29, 2019--routine follow-up.  Fasting serum glucose elevated at 113.  Recommend decreased carbohydrate diet, exercise, weight loss.   • Migraine headaches 10/22/2013    08/19/2014--MRI again performed for left sided facial and arm numbness and tingling.  This reveals status post resection of a meningioma via a left temporoparietal craniotomy.  There is no evidence to suggest residual or recurrent tumor.  Again noted is a focus of encephalomalacia within the anterior portion of the left temporal lobe deep to the craniotomy flap.  10/29/2013--echocardiogram performed for possible TIA symptoms revealed normal left ventricular size thickness and function. There were no significant valvular abnormalities.  10/24/2013--MRI performed for possible TIA. Prior large 10 cm lateral left temporoparietal craniotomy and an additional 3.5 cm mid to inferior left  temporal craniectomy  by history resection of a meningioma.  There is encephalomalacia in anterior inferior left temporal lobe which tracks approximately 3 x 2.1 cm. The remainder of the brain parenchyma is normal in signal intensity. Ventricles are normal. No mass effect noted. No midline shift and no extra-axial fluid collecti   • Vitamin D deficiency 4/14/2016         Past Surgical History:   Procedure Laterality Date   • BRAIN MENINGIOMA EXCISION  26 years old    26 years of age--status post resection of benign brain tumor believed to be a meningioma. Located in the anterior aspect of the left temporal lobe. 10/24/2013--MRI of the brain performed for possible TIA revealed surgical changes of a lateral left temporal parietal craniotomy as well as mid to inferior squamosal left temporal craniotomy consistent with patient's previous surgery. There was a 3.5 x   • COLONOSCOPY  08/20/2015 08/20/2015--entirely normal screening colonoscopy.   • LAPAROSCOPIC HYSTERECTOMY  2010 or so    due to abnormal bleeding due to fibroids, done at [facility], unable to recall physician   • TUBAL ABDOMINAL LIGATION  28 years old    28 years old--tubal ligation.         No Known Allergies        Current Outpatient Medications:   •  ALPRAZolam (XANAX) 0.5 MG tablet, Take 0.5-1 tablets by mouth 2 (Two) Times a Day As Needed for anxiety, Disp: 60 tablet, Rfl: 3  •  aspirin  MG EC tablet, Take  by mouth daily., Disp: , Rfl:   •  atorvastatin (Lipitor) 80 MG tablet, Take 1 tablet by mouth Daily., Disp: 90 tablet, Rfl: 4  •  Cholecalciferol (VITAMIN D3) 5000 UNITS capsule capsule, 1 by mouth daily as directed, Disp: 30 capsule, Rfl: 11  •  citalopram (CeleXA) 20 MG tablet, Take 1 tablet by mouth nightly for depression and anxiety., Disp: 30 tablet, Rfl: 6  •  olmesartan-hydrochlorothiazide (Benicar HCT) 40-25 MG per tablet, Take 1 tablet by mouth Daily for blood pressure., Disp: 90 tablet, Rfl: 1  •  propranolol (INDERAL) 20 MG tablet, Take 1 tablet by mouth 3 times a day for migraine  "headaches, Disp: 270 tablet, Rfl: 1      Family History   Problem Relation Age of Onset   • Throat cancer Father    • Stroke Father    • Hypertension Other         Benign Essential   • Stroke Brother    • Breast cancer Neg Hx    • Ovarian cancer Neg Hx    • Uterine cancer Neg Hx    • Colon cancer Neg Hx    • Pancreatic cancer Neg Hx          Social History     Socioeconomic History   • Marital status: Legally      Spouse name: Not on file   • Number of children: Not on file   • Years of education: Not on file   • Highest education level: Not on file   Tobacco Use   • Smoking status: Never Smoker   • Smokeless tobacco: Never Used   Vaping Use   • Vaping Use: Never used   Substance and Sexual Activity   • Alcohol use: Yes     Comment: Socially   • Drug use: No   • Sexual activity: Yes     Partners: Male     Birth control/protection: Surgical     Comment: hysterectomy          Vitals:    06/25/21 1051   BP: 115/86   Pulse: 87   Resp: 16   Temp: 98 °F (36.7 °C)   SpO2: 96%   Weight: 87.5 kg (193 lb)   Height: 157.5 cm (62\")        Body mass index is 35.3 kg/m².      Physical Exam:    General: Alert and oriented x 3.  No acute distress, but she is tearful.  Normal affect.  HEENT: Pupils equal, round, reactive to light; extraocular movements intact; sclerae nonicteric; pharynx, ear canals and TMs normal.  Neck: Without JVD, thyromegaly, bruit, or adenopathy.  Lungs: Clear to auscultation in all fields.  Heart: Regular rate and rhythm without murmur, rub, gallop, or click.  Abdomen: Soft, nontender, without hepatosplenomegaly or hernia.  Bowel sounds normal.  : Deferred.  Rectal: Deferred.  Extremities: Without clubbing, cyanosis, edema, or pulse deficit.  Neurologic: Intact without focal deficit.  Normal station and gait observed during ingress and egress from the examination room.  Skin: Without significant lesion.  Musculoskeletal: Unremarkable.    Lab/other results:      Assessment/Plan:     Diagnosis Plan "   1. Acute on chronic alteration in mental status     2. Memory loss     3. Anxiety disorder due to general medical condition     4. Depression with anxiety       June 25, 2021--patient seen in follow-up and is still obviously depressed and anxious.  She is on 20 mg of citalopram but it is too early to tell if this is more effective than the Effexor.  The Xanax helps with the anxiety as expected.  Patient is still clearly not able to go back to work at this time.  She has her neuropsychiatric evaluation appointment July 7.  The MRI of the brain is to be done July 2.  Plan is as follows: We will add Vraylar 1.25 mg/day.  Patient may increase this to 3 mg/day if after 2 weeks she feels that it is helping but not quite enough.  I explained to her that 1-1/2 to 3 mg his usual dose that should help as an adjunct to depression treatment in this situation.  She should stay on the citalopram and use the Xanax as needed.  I will have her follow-up July 14 which is previously scheduled.  Patient needs to stay off work until further notice.  FMLA papers received today and will be completed ASAP.    Toña 15, 2021--patient reports at least a 6-month history of intermittent and fluctuating loss of memory/recall and occasionally she gets confused such as lost while driving.  She reports she has been stuttering a lot but does not really have any new neurologic symptoms or focal deficits.  She has a history of migraine headaches and takes Inderal for this and this is helped but not totally eliminated her headaches.  She saw one of my partners yesterday regarding this and reports she felt like she did absolutely nothing to help her.  Lab work was ordered which was drawn yesterday.  Patient was started on Effexor Exar 37.5 mg daily along with hydroxyzine 25 mg twice daily and instructions to start weaning Xanax which she has been on about 2-1/2 years.  Patient reports she is under a lot of stress.  I would not go into details but  there is some difficulties at work along with difficulties with family.  The situations are the worst that they have ever been.  Plan is as follows: Discontinue Effexor extended release.  Note that patient has not picked up this prescription and I will send a message to the pharmacy to cancel it.  Start citalopram 10 mg/day for the first week to 10 days and then increase to 20 mg/day.  Do not start weaning Xanax.  Change Xanax to 0.5 mg, 1/2-1 p.o. twice daily.  Discontinue Atarax prescription.  Discontinue order for CT scan of the brain and instead order MRI of the brain.  Neuropsychological testing ordered.  I do not feel that patient is capable to fulfill the duties of her work at the present time and I think she needs to be off for a 2-week duration initially until we can try to get her issues under control.  She may need light duty in the future for a while as well.  Psychotherapy is also a consideration and at the present time I am strongly recommending this.  However, we will hold off until we get the neuropsychological evaluation back.  It may be that someone in that office can do therapy for her as well.  I will have her follow-up in 2 weeks to reassess the situation and determine whether or not she is fit to go back to work.  Work note given.      Procedures

## 2021-07-02 ENCOUNTER — HOSPITAL ENCOUNTER (OUTPATIENT)
Dept: MAMMOGRAPHY | Facility: HOSPITAL | Age: 56
Discharge: HOME | End: 2021-07-02
Admitting: FAMILY MEDICINE

## 2021-07-02 PROCEDURE — 77067 SCR MAMMO BI INCL CAD: CPT

## 2021-07-13 ENCOUNTER — HOSPITAL ENCOUNTER (OUTPATIENT)
Dept: MRI IMAGING | Facility: HOSPITAL | Age: 56
Discharge: HOME | End: 2021-07-13
Admitting: INTERNAL MEDICINE

## 2021-07-13 DIAGNOSIS — Z86.018 HISTORY OF MENINGIOMA: Chronic | ICD-10-CM

## 2021-07-13 DIAGNOSIS — R41.3 MEMORY LOSS: ICD-10-CM

## 2021-07-13 DIAGNOSIS — R41.82 ACUTE ON CHRONIC ALTERATION IN MENTAL STATUS: ICD-10-CM

## 2021-07-13 DIAGNOSIS — IMO0002 CHRONIC MIGRAINE: Chronic | ICD-10-CM

## 2021-07-13 PROCEDURE — 0 GADOBENATE DIMEGLUMINE 529 MG/ML SOLUTION: Performed by: INTERNAL MEDICINE

## 2021-07-13 PROCEDURE — A9577 INJ MULTIHANCE: HCPCS | Performed by: INTERNAL MEDICINE

## 2021-07-13 PROCEDURE — 70553 MRI BRAIN STEM W/O & W/DYE: CPT

## 2021-07-13 RX ADMIN — GADOBENATE DIMEGLUMINE 19 ML: 529 INJECTION, SOLUTION INTRAVENOUS at 09:00

## 2021-07-14 ENCOUNTER — OFFICE VISIT (OUTPATIENT)
Dept: INTERNAL MEDICINE | Facility: CLINIC | Age: 56
End: 2021-07-14

## 2021-07-14 VITALS
WEIGHT: 199 LBS | RESPIRATION RATE: 16 BRPM | HEIGHT: 62 IN | TEMPERATURE: 98 F | HEART RATE: 59 BPM | OXYGEN SATURATION: 96 % | DIASTOLIC BLOOD PRESSURE: 74 MMHG | SYSTOLIC BLOOD PRESSURE: 133 MMHG | BODY MASS INDEX: 36.62 KG/M2

## 2021-07-14 DIAGNOSIS — R41.82 ACUTE ON CHRONIC ALTERATION IN MENTAL STATUS: ICD-10-CM

## 2021-07-14 DIAGNOSIS — R41.3 MEMORY LOSS: ICD-10-CM

## 2021-07-14 DIAGNOSIS — F41.8 DEPRESSION WITH ANXIETY: Primary | Chronic | ICD-10-CM

## 2021-07-14 PROCEDURE — 99214 OFFICE O/P EST MOD 30 MIN: CPT | Performed by: INTERNAL MEDICINE

## 2021-07-14 NOTE — PROGRESS NOTES
07/14/2021    Patient Information  Ruba Ohara                                                                                          [address]  [place] [id number]      1965  [unfilled]  [phone] (work)    Chief Complaint:     Follow-up depression, memory loss, acute on chronic alteration in mental status/confusion.    History of Present Illness:    Patient with history of medical problems as outlined in chief complaint presents today for a follow-up and history regarding this is as below:    July 14, 2021--patient seen in follow-up.  MRI of the brain reveals no evidence of residual or recurrent meningioma.  Encephalomalacia involving left temporal lobe anteriorly which is similar in appearance compared to the prior examination.  Patient may be some better.  She is sleeping better.  She still somewhat tearful and depressed.  She thinks that the Vraylar may be helping some but has not increased it as far as dosing is concerned.  No one has contacted her regarding neuropsychological testing.  I am going to reorder this.  Plan is as follows: Increase Vraylar to a total of 3 mg/day.  Reorder neuropsychological testing.  Stay on citalopram 20 mg/day.  No other changes.  She can stay on the Xanax as needed.  I will have her follow-up in about 3 to 4 weeks to reassess the situation.  I contacted [facility] and patient can call herself to schedule therapy.  I think she needs that before any definite neuropsychological evaluation.  I do not think were having physical or neurologic issues here.  Patient agrees she thinks that she needs to talk to someone first.    June 25, 2021--patient seen in follow-up and is still obviously depressed and anxious.  She is on 20 mg of citalopram but it is too early to tell if this is more effective than the Effexor.  The Xanax helps with the anxiety as expected.  Patient is still clearly not able to go back to work at this time.  She  has her neuropsychiatric evaluation appointment July 7.  The MRI of the brain is to be done July 2.  Plan is as follows: We will add Vraylar 1.25 mg/day.  Patient may increase this to 3 mg/day if after 2 weeks she feels that it is helping but not quite enough.  I explained to her that 1-1/2 to 3 mg his usual dose that should help as an adjunct to depression treatment in this situation.  She should stay on the citalopram and use the Xanax as needed.  I will have her follow-up July 14 which is previously scheduled.  Patient needs to stay off work until further notice.    Toña 15, 2021--patient reports at least a 6-month history of intermittent and fluctuating loss of memory/recall and occasionally she gets confused such as lost while driving.  She reports she has been stuttering a lot but does not really have any new neurologic symptoms or focal deficits.  She has a history of migraine headaches and takes Inderal for this and this is helped but not totally eliminated her headaches.  She saw one of my partners yesterday regarding this and reports she felt like she did absolutely nothing to help her.  Lab work was ordered which was drawn yesterday.  Patient was started on Effexor Exar 37.5 mg daily along with hydroxyzine 25 mg twice daily and instructions to start weaning Xanax which she has been on about 2-1/2 years.  Patient reports she is under a lot of stress.  I would not go into details but there is some difficulties at work along with difficulties with family.  The situations are the worst that they have ever been.  Plan is as follows: Discontinue Effexor extended release.  Note that patient has not picked up this prescription and I will send a message to the pharmacy to cancel it.  Start citalopram 10 mg/day for the first week to 10 days and then increase to 20 mg/day.  Do not start weaning Xanax.  Change Xanax to 0.5 mg, 1/2-1 p.o. twice daily.  Discontinue Atarax prescription.  Discontinue order for CT scan of  the brain and instead order MRI of the brain.  Neuropsychological testing ordered.  I do not feel that patient is capable to fulfill the duties of her work at the present time and I think she needs to be off for a 2-week duration initially until we can try to get her issues under control.  She may need light duty in the future for a while as well.  Psychotherapy is also a consideration and at the present time I am strongly recommending this.  However, we will hold off until we get the neuropsychological evaluation back.  It may be that someone in that office can do therapy for her as well.  I will have her follow-up in 2 weeks to reassess the situation and determine whether or not she is fit to go back to work.  Work note given.    Review of Systems   Constitutional: Negative.   HENT: Negative.    Eyes: Negative.    Cardiovascular: Negative.    Respiratory: Negative.    Endocrine: Negative.    Hematologic/Lymphatic: Negative.    Skin: Negative.    Musculoskeletal: Negative.    Gastrointestinal: Negative.    Genitourinary: Negative.    Neurological: Negative.    Psychiatric/Behavioral: Positive for depression and memory loss. Negative for substance abuse, suicidal ideas and thoughts of violence. The patient is nervous/anxious.    Allergic/Immunologic: Negative.        Active Problems:    Patient Active Problem List   Diagnosis   • Anxiety disorder due to general medical condition   • Benign essential hypertension   • Carotid artery plaque, 12/30/2019--negative MRA neck.  02/01/2018--16-49% bilateral.  10/29/2013--right ICA plaque.  Normal left ICA.   • Hirsutism   • Hyperlipidemia   • Migraine headaches   • Vitamin D deficiency   • Therapeutic drug monitoring   • Routine physical examination   • History of meningioma   • Depression with anxiety   • Morbid obesity (CMS/HCC)   • Impaired fasting glucose   • Hypoactive sexual desire disorder   • Memory loss   • Acute on chronic alteration in mental status         Past  Medical History:   Diagnosis Date   • Anxiety disorder due to general medical condition 4/14/2016   • Benign essential hypertension 4/12/2006 04/12/2006--treatment for hypertension begun.   • Carotid artery plaque, 12/30/2019--negative MRA neck.  02/01/2018--16-49% bilateral.  10/29/2013--right ICA plaque.  Normal left ICA. 10/29/2013    December 30, 2019--MRA of the head and neck negative.  February 1, 2018--carotid Doppler study revealed 16-49% bilateral carotid artery plaque.  10/29/2013--carotid Doppler revealed plaque without stenosis in the right internal carotid artery. There was no evidence of hemodynamically significant stenosis in the left carotid system. Antegrade flow was present in the left vertebral artery.   • Depression with anxiety 4/26/2016   • Hirsutism 4/14/2016   • History of iron deficiency anemia 07/21/2015 07/21/2015--patient was admitted briefly to the hospital with a hemoglobin of 7.3. This was believed to be due to an episode of rectal bleeding, but primarily due to menorrhagia.   • History of meningioma 08/19/2014 08/19/2014--MRI again performed for left sided facial and arm numbness and tingling.  This reveals status post resection of a meningioma via a left temporoparietal craniotomy.  There is no evidence to suggest residual or recurrent tumor.  Again noted is a focus of encephalomalacia within the anterior portion of the left temporal lobe deep to the craniotomy flap.  10/24/2013--MRI of the brain perfo   • History of Seizure disorder, grand mal 4/14/2016    This was related to a benign brain tumor of the left temporal lobe that is believed to be a meningioma. Status post resection and no recurrence of the seizures.   • Hyperlipidemia 2/7/2012 02/07/2012--treatment for hyperlipidemia begun.   • Hypoactive sexual desire disorder 6/13/2019   • Impaired fasting glucose 4/29/2019 April 29, 2019--routine follow-up.  Fasting serum glucose elevated at 113.  Recommend decreased  carbohydrate diet, exercise, weight loss.   • Migraine headaches 10/22/2013    08/19/2014--MRI again performed for left sided facial and arm numbness and tingling.  This reveals status post resection of a meningioma via a left temporoparietal craniotomy.  There is no evidence to suggest residual or recurrent tumor.  Again noted is a focus of encephalomalacia within the anterior portion of the left temporal lobe deep to the craniotomy flap.  10/29/2013--echocardiogram performed for possible TIA symptoms revealed normal left ventricular size thickness and function. There were no significant valvular abnormalities.  10/24/2013--MRI performed for possible TIA. Prior large 10 cm lateral left temporoparietal craniotomy and an additional 3.5 cm mid to inferior left  temporal craniectomy  by history resection of a meningioma. There is encephalomalacia in anterior inferior left temporal lobe which tracks approximately 3 x 2.1 cm. The remainder of the brain parenchyma is normal in signal intensity. Ventricles are normal. No mass effect noted. No midline shift and no extra-axial fluid collecti   • Vitamin D deficiency 4/14/2016         Past Surgical History:   Procedure Laterality Date   • BRAIN MENINGIOMA EXCISION  26 years old    26 years of age--status post resection of benign brain tumor believed to be a meningioma. Located in the anterior aspect of the left temporal lobe. 10/24/2013--MRI of the brain performed for possible TIA revealed surgical changes of a lateral left temporal parietal craniotomy as well as mid to inferior squamosal left temporal craniotomy consistent with patient's previous surgery. There was a 3.5 x   • COLONOSCOPY  08/20/2015 08/20/2015--entirely normal screening colonoscopy.   • LAPAROSCOPIC HYSTERECTOMY  2010 or so    due to abnormal bleeding due to fibroids, done at [facility], unable to recall physician   • TUBAL ABDOMINAL LIGATION  28 years old    28 years old--tubal ligation.         No Known  Allergies        Current Outpatient Medications:   •  ALPRAZolam (XANAX) 0.5 MG tablet, Take 0.5-1 tablets by mouth 2 (Two) Times a Day As Needed for anxiety, Disp: 60 tablet, Rfl: 3  •  aspirin  MG EC tablet, Take  by mouth daily., Disp: , Rfl:   •  atorvastatin (Lipitor) 80 MG tablet, Take 1 tablet by mouth Daily., Disp: 90 tablet, Rfl: 4  •  Cholecalciferol (VITAMIN D3) 5000 UNITS capsule capsule, 1 by mouth daily as directed, Disp: 30 capsule, Rfl: 11  •  citalopram (CeleXA) 20 MG tablet, Take 1 tablet by mouth nightly for depression and anxiety., Disp: 30 tablet, Rfl: 6  •  olmesartan-hydrochlorothiazide (Benicar HCT) 40-25 MG per tablet, Take 1 tablet by mouth Daily for blood pressure., Disp: 90 tablet, Rfl: 1  •  propranolol (INDERAL) 20 MG tablet, Take 1 tablet by mouth 3 times a day for migraine headaches, Disp: 270 tablet, Rfl: 1  •  Cariprazine HCl (Vraylar) 3 MG capsule capsule, Take 1 capsule by mouth Daily., Disp: 30 capsule, Rfl: 6      Family History   Problem Relation Age of Onset   • Throat cancer Father    • Stroke Father    • Hypertension Other         Benign Essential   • Stroke Brother    • Breast cancer Neg Hx    • Ovarian cancer Neg Hx    • Uterine cancer Neg Hx    • Colon cancer Neg Hx    • Pancreatic cancer Neg Hx          Social History     Socioeconomic History   • Marital status: Legally      Spouse name: Not on file   • Number of children: Not on file   • Years of education: Not on file   • Highest education level: Not on file   Tobacco Use   • Smoking status: Never Smoker   • Smokeless tobacco: Never Used   Vaping Use   • Vaping Use: Never used   Substance and Sexual Activity   • Alcohol use: Yes     Comment: Socially   • Drug use: No   • Sexual activity: Yes     Partners: Male     Birth control/protection: Surgical     Comment: hysterectomy          Vitals:    07/14/21 1428   BP: 133/74   Pulse: 59   Resp: 16   Temp: 98 °F (36.7 °C)   SpO2: 96%   Weight: 90.3 kg (199  "lb)   Height: 157.5 cm (62\")        Body mass index is 36.4 kg/m².      Physical Exam:    General: Alert and oriented x 3.  No acute distress.  Patient appears calmer and less tearful.  Affect seems better.  HEENT: Pupils equal, round, reactive to light; extraocular movements intact; sclerae nonicteric; pharynx, ear canals and TMs normal.  Neck: Without JVD, thyromegaly, bruit, or adenopathy.  Lungs: Clear to auscultation in all fields.  Heart: Regular rate and rhythm without murmur, rub, gallop, or click.  Abdomen: Soft, nontender, without hepatosplenomegaly or hernia.  Bowel sounds normal.  : Deferred.  Rectal: Deferred.  Extremities: Without clubbing, cyanosis, edema, or pulse deficit.  Neurologic: Intact without focal deficit.  Normal station and gait observed during ingress and egress from the examination room.  Skin: Without significant lesion.  Musculoskeletal: Unremarkable.    Lab/other results:  I reviewed the results of the MRI of the brain with the patient  And this reveals no evidence of residual or recurrent meningioma.  There is encephalomalacia involving the left temporal lobe anteriorly which is similar to the previous evaluation.    Assessment/Plan:     Diagnosis Plan   1. Depression with anxiety  NeuroPsych Testing    Cariprazine HCl (Vraylar) 3 MG capsule capsule   2. Acute on chronic alteration in mental status  NeuroPsych Testing   3. Memory loss  NeuroPsych Testing     July 14, 2021--patient seen in follow-up.  MRI of the brain reveals no evidence of residual or recurrent meningioma.  Encephalomalacia involving left temporal lobe anteriorly which is similar in appearance compared to the prior examination.  Patient may be some better.  She is sleeping better.  She still somewhat tearful and depressed.  She thinks that the Vraylar may be helping some but has not increased it as far as dosing is concerned.  No one has contacted her regarding neuropsychological testing.  I am going to reorder " this.  Plan is as follows: Increase Vraylar to a total of 3 mg/day.  Reorder neuropsychological testing.  Stay on citalopram 20 mg/day.  No other changes.  She can stay on the Xanax as needed.  I will have her follow-up in about 3 to 4 weeks to reassess the situation.  I contacted [facility] and patient can call herself to schedule therapy.  I think she needs that before any definite neuropsychological evaluation.  I do not think were having physical or neurologic issues here.  Patient agrees she thinks that she needs to talk to someone first.    June 25, 2021--patient seen in follow-up and is still obviously depressed and anxious.  She is on 20 mg of citalopram but it is too early to tell if this is more effective than the Effexor.  The Xanax helps with the anxiety as expected.  Patient is still clearly not able to go back to work at this time.  She has her neuropsychiatric evaluation appointment July 7.  The MRI of the brain is to be done July 2.  Plan is as follows: We will add Vraylar 1.25 mg/day.  Patient may increase this to 3 mg/day if after 2 weeks she feels that it is helping but not quite enough.  I explained to her that 1-1/2 to 3 mg his usual dose that should help as an adjunct to depression treatment in this situation.  She should stay on the citalopram and use the Xanax as needed.  I will have her follow-up July 14 which is previously scheduled.  Patient needs to stay off work until further notice.    Toña 15, 2021--patient reports at least a 6-month history of intermittent and fluctuating loss of memory/recall and occasionally she gets confused such as lost while driving.  She reports she has been stuttering a lot but does not really have any new neurologic symptoms or focal deficits.  She has a history of migraine headaches and takes Inderal for this and this is helped but not totally eliminated her headaches.  She saw one of my partners yesterday regarding this and reports she felt like  she did absolutely nothing to help her.  Lab work was ordered which was drawn yesterday.  Patient was started on Effexor Exar 37.5 mg daily along with hydroxyzine 25 mg twice daily and instructions to start weaning Xanax which she has been on about 2-1/2 years.  Patient reports she is under a lot of stress.  I would not go into details but there is some difficulties at work along with difficulties with family.  The situations are the worst that they have ever been.  Plan is as follows: Discontinue Effexor extended release.  Note that patient has not picked up this prescription and I will send a message to the pharmacy to cancel it.  Start citalopram 10 mg/day for the first week to 10 days and then increase to 20 mg/day.  Do not start weaning Xanax.  Change Xanax to 0.5 mg, 1/2-1 p.o. twice daily.  Discontinue Atarax prescription.  Discontinue order for CT scan of the brain and instead order MRI of the brain.  Neuropsychological testing ordered.  I do not feel that patient is capable to fulfill the duties of her work at the present time and I think she needs to be off for a 2-week duration initially until we can try to get her issues under control.  She may need light duty in the future for a while as well.  Psychotherapy is also a consideration and at the present time I am strongly recommending this.  However, we will hold off until we get the neuropsychological evaluation back.  It may be that someone in that office can do therapy for her as well.  I will have her follow-up in 2 weeks to reassess the situation and determine whether or not she is fit to go back to work.  Work note given.      Procedures

## 2021-07-15 ENCOUNTER — APPOINTMENT (OUTPATIENT)
Dept: MRI IMAGING | Facility: HOSPITAL | Age: 56
End: 2021-07-15

## 2021-08-11 ENCOUNTER — OFFICE VISIT (OUTPATIENT)
Dept: INTERNAL MEDICINE | Facility: CLINIC | Age: 56
End: 2021-08-11

## 2021-08-11 VITALS
WEIGHT: 203 LBS | RESPIRATION RATE: 15 BRPM | BODY MASS INDEX: 37.36 KG/M2 | OXYGEN SATURATION: 99 % | HEART RATE: 89 BPM | DIASTOLIC BLOOD PRESSURE: 80 MMHG | SYSTOLIC BLOOD PRESSURE: 130 MMHG | HEIGHT: 62 IN

## 2021-08-11 DIAGNOSIS — F41.8 DEPRESSION WITH ANXIETY: Primary | Chronic | ICD-10-CM

## 2021-08-11 DIAGNOSIS — F06.4 ANXIETY DISORDER DUE TO GENERAL MEDICAL CONDITION: Chronic | ICD-10-CM

## 2021-08-11 DIAGNOSIS — R41.3 MEMORY LOSS: ICD-10-CM

## 2021-08-11 DIAGNOSIS — R41.82 ACUTE ON CHRONIC ALTERATION IN MENTAL STATUS: ICD-10-CM

## 2021-08-11 PROCEDURE — 99213 OFFICE O/P EST LOW 20 MIN: CPT | Performed by: INTERNAL MEDICINE

## 2021-08-11 NOTE — PROGRESS NOTES
08/11/2021    Patient Information  Ruba Ohara                                                                                          [address]  [facility] [address]      1965  [unfilled]  [phone] (work)    Chief Complaint:     Follow-up depression with anxiety, memory loss and acute on chronic alteration in mental status.  No new acute complaints.    History of Present Illness:    Patient with a history of depression and anxiety as well as generalized anxiety, hypertension, carotid artery plaque, migraine headaches, history of meningioma, morbid obesity, impaired fasting glucose, hyperlipidemia.  She presents today to follow-up on treatment for acute deterioration of her depression and anxiety as described below.  Past medical history reviewed and updated were necessary including health maintenance parameters.  This reveals she is up-to-date or else accounted for after today's visit.    The history regarding depression anxiety and problems with memory:    August 11, 2021--patient seen in follow-up.  Patient is doing better and she reports she has a good therapist.  She tolerated the medication increase well.  She is also had a long talk with her family about some issues that I will go into here.  Overall seems to be making progress.  We do not feel she is quite ready to go back to work.  We will continue with the therapy and reassess the situation and reassess the situation in about a month.  I am not going to make any changes to her regimen right now.      July 14, 2021--patient seen in follow-up.  MRI of the brain reveals no evidence of residual or recurrent meningioma.  Encephalomalacia involving left temporal lobe anteriorly which is similar in appearance compared to the prior examination.  Patient may be some better.  She is sleeping better.  She still somewhat tearful and depressed.  She thinks that the Vraylar may be helping some but has not increased it as far as  dosing is concerned.  No one has contacted her regarding neuropsychological testing.  I am going to reorder this.  Plan is as follows: Increase Vraylar to a total of 3 mg/day.  Reorder neuropsychological testing.  Stay on citalopram 20 mg/day.  No other changes.  She can stay on the Xanax as needed.  I will have her follow-up in about 3 to 4 weeks to reassess the situation.  I contacted [facility] and patient can call herself to schedule therapy.  I think she needs that before any definite neuropsychological evaluation.  I do not think were having physical or neurologic issues here.  Patient agrees she thinks that she needs to talk to someone first.    June 25, 2021--patient seen in follow-up and is still obviously depressed and anxious.  She is on 20 mg of citalopram but it is too early to tell if this is more effective than the Effexor.  The Xanax helps with the anxiety as expected.  Patient is still clearly not able to go back to work at this time.  She has her neuropsychiatric evaluation appointment July 7.  The MRI of the brain is to be done July 2.  Plan is as follows: We will add Vraylar 1.25 mg/day.  Patient may increase this to 3 mg/day if after 2 weeks she feels that it is helping but not quite enough.  I explained to her that 1-1/2 to 3 mg his usual dose that should help as an adjunct to depression treatment in this situation.  She should stay on the citalopram and use the Xanax as needed.  I will have her follow-up July 14 which is previously scheduled.  Patient needs to stay off work until further notice.    Toña 15, 2021--patient reports at least a 6-month history of intermittent and fluctuating loss of memory/recall and occasionally she gets confused such as lost while driving.  She reports she has been stuttering a lot but does not really have any new neurologic symptoms or focal deficits.  She has a history of migraine headaches and takes Inderal for this and this is helped but not totally  eliminated her headaches.  She saw one of my partners yesterday regarding this and reports she felt like she did absolutely nothing to help her.  Lab work was ordered which was drawn yesterday.  Patient was started on Effexor Exar 37.5 mg daily along with hydroxyzine 25 mg twice daily and instructions to start weaning Xanax which she has been on about 2-1/2 years.  Patient reports she is under a lot of stress.  I would not go into details but there is some difficulties at work along with difficulties with family.  The situations are the worst that they have ever been.  Plan is as follows: Discontinue Effexor extended release.  Note that patient has not picked up this prescription and I will send a message to the pharmacy to cancel it.  Start citalopram 10 mg/day for the first week to 10 days and then increase to 20 mg/day.  Do not start weaning Xanax.  Change Xanax to 0.5 mg, 1/2-1 p.o. twice daily.  Discontinue Atarax prescription.  Discontinue order for CT scan of the brain and instead order MRI of the brain.  Neuropsychological testing ordered.  I do not feel that patient is capable to fulfill the duties of her work at the present time and I think she needs to be off for a 2-week duration initially until we can try to get her issues under control.  She may need light duty in the future for a while as well.  Psychotherapy is also a consideration and at the present time I am strongly recommending this.  However, we will hold off until we get the neuropsychological evaluation back.  It may be that someone in that office can do therapy for her as well.  I will have her follow-up in 2 weeks to reassess the situation and determine whether or not she is fit to go back to work.  Work note given.    Review of Systems   Constitutional: Negative.   HENT: Negative.    Eyes: Negative.    Cardiovascular: Negative.    Respiratory: Negative.    Endocrine: Negative.    Hematologic/Lymphatic: Negative.    Skin: Negative.     Musculoskeletal: Negative.    Gastrointestinal: Negative.    Genitourinary: Negative.    Neurological: Negative.    Psychiatric/Behavioral: Positive for depression and memory loss. The patient has insomnia and is nervous/anxious.    Allergic/Immunologic: Negative.        Active Problems:    Patient Active Problem List   Diagnosis   • Anxiety disorder due to general medical condition   • Benign essential hypertension   • Carotid artery plaque, 12/30/2019--negative MRA neck.  02/01/2018--16-49% bilateral.  10/29/2013--right ICA plaque.  Normal left ICA.   • Hirsutism   • Hyperlipidemia   • Migraine headaches   • Vitamin D deficiency   • Therapeutic drug monitoring   • Routine physical examination   • History of meningioma   • Depression with anxiety   • Morbid obesity (CMS/HCC)   • Impaired fasting glucose   • Hypoactive sexual desire disorder   • Memory loss   • Acute on chronic alteration in mental status         Past Medical History:   Diagnosis Date   • Anxiety disorder due to general medical condition 4/14/2016   • Benign essential hypertension 4/12/2006 04/12/2006--treatment for hypertension begun.   • Carotid artery plaque, 12/30/2019--negative MRA neck.  02/01/2018--16-49% bilateral.  10/29/2013--right ICA plaque.  Normal left ICA. 10/29/2013    December 30, 2019--MRA of the head and neck negative.  February 1, 2018--carotid Doppler study revealed 16-49% bilateral carotid artery plaque.  10/29/2013--carotid Doppler revealed plaque without stenosis in the right internal carotid artery. There was no evidence of hemodynamically significant stenosis in the left carotid system. Antegrade flow was present in the left vertebral artery.   • Depression with anxiety 4/26/2016   • Hirsutism 4/14/2016   • History of iron deficiency anemia 07/21/2015 07/21/2015--patient was admitted briefly to the hospital with a hemoglobin of 7.3. This was believed to be due to an episode of rectal bleeding, but primarily due to  menorrhagia.   • History of meningioma 08/19/2014 08/19/2014--MRI again performed for left sided facial and arm numbness and tingling.  This reveals status post resection of a meningioma via a left temporoparietal craniotomy.  There is no evidence to suggest residual or recurrent tumor.  Again noted is a focus of encephalomalacia within the anterior portion of the left temporal lobe deep to the craniotomy flap.  10/24/2013--MRI of the brain perfo   • History of Seizure disorder, grand mal 4/14/2016    This was related to a benign brain tumor of the left temporal lobe that is believed to be a meningioma. Status post resection and no recurrence of the seizures.   • Hyperlipidemia 2/7/2012 02/07/2012--treatment for hyperlipidemia begun.   • Hypoactive sexual desire disorder 6/13/2019   • Impaired fasting glucose 4/29/2019 April 29, 2019--routine follow-up.  Fasting serum glucose elevated at 113.  Recommend decreased carbohydrate diet, exercise, weight loss.   • Migraine headaches 10/22/2013    08/19/2014--MRI again performed for left sided facial and arm numbness and tingling.  This reveals status post resection of a meningioma via a left temporoparietal craniotomy.  There is no evidence to suggest residual or recurrent tumor.  Again noted is a focus of encephalomalacia within the anterior portion of the left temporal lobe deep to the craniotomy flap.  10/29/2013--echocardiogram performed for possible TIA symptoms revealed normal left ventricular size thickness and function. There were no significant valvular abnormalities.  10/24/2013--MRI performed for possible TIA. Prior large 10 cm lateral left temporoparietal craniotomy and an additional 3.5 cm mid to inferior left  temporal craniectomy  by history resection of a meningioma. There is encephalomalacia in anterior inferior left temporal lobe which tracks approximately 3 x 2.1 cm. The remainder of the brain parenchyma is normal in signal intensity.  Ventricles are normal. No mass effect noted. No midline shift and no extra-axial fluid collecti   • Vitamin D deficiency 4/14/2016         Past Surgical History:   Procedure Laterality Date   • BRAIN MENINGIOMA EXCISION  26 years old    26 years of age--status post resection of benign brain tumor believed to be a meningioma. Located in the anterior aspect of the left temporal lobe. 10/24/2013--MRI of the brain performed for possible TIA revealed surgical changes of a lateral left temporal parietal craniotomy as well as mid to inferior squamosal left temporal craniotomy consistent with patient's previous surgery. There was a 3.5 x   • COLONOSCOPY  08/20/2015 08/20/2015--entirely normal screening colonoscopy.   • LAPAROSCOPIC HYSTERECTOMY  2010 or so    due to abnormal bleeding due to fibroids, done at [facility], unable to recall physician   • TUBAL ABDOMINAL LIGATION  28 years old    28 years old--tubal ligation.         No Known Allergies        Current Outpatient Medications:   •  ALPRAZolam (XANAX) 0.5 MG tablet, Take 0.5-1 tablets by mouth 2 (Two) Times a Day As Needed for anxiety, Disp: 60 tablet, Rfl: 3  •  aspirin  MG EC tablet, Take  by mouth daily., Disp: , Rfl:   •  atorvastatin (Lipitor) 80 MG tablet, Take 1 tablet by mouth Daily., Disp: 90 tablet, Rfl: 4  •  Cariprazine HCl (Vraylar) 3 MG capsule capsule, Take 1 capsule by mouth Daily., Disp: 30 capsule, Rfl: 6  •  Cholecalciferol (VITAMIN D3) 5000 UNITS capsule capsule, 1 by mouth daily as directed, Disp: 30 capsule, Rfl: 11  •  citalopram (CeleXA) 20 MG tablet, Take 1 tablet by mouth nightly for depression and anxiety., Disp: 30 tablet, Rfl: 6  •  olmesartan-hydrochlorothiazide (Benicar HCT) 40-25 MG per tablet, Take 1 tablet by mouth Daily for blood pressure., Disp: 90 tablet, Rfl: 1  •  propranolol (INDERAL) 20 MG tablet, Take 1 tablet by mouth 3 times a day for migraine headaches, Disp: 270 tablet, Rfl: 1      Family History   Problem Relation  "Age of Onset   • Throat cancer Father    • Stroke Father    • Hypertension Other         Benign Essential   • Stroke Brother    • Breast cancer Neg Hx    • Ovarian cancer Neg Hx    • Uterine cancer Neg Hx    • Colon cancer Neg Hx    • Pancreatic cancer Neg Hx          Social History     Socioeconomic History   • Marital status: Legally      Spouse name: Not on file   • Number of children: Not on file   • Years of education: Not on file   • Highest education level: Not on file   Tobacco Use   • Smoking status: Never Smoker   • Smokeless tobacco: Never Used   Vaping Use   • Vaping Use: Never used   Substance and Sexual Activity   • Alcohol use: Yes     Comment: Socially   • Drug use: No   • Sexual activity: Yes     Partners: Male     Birth control/protection: Surgical     Comment: hysterectomy          Vitals:    08/11/21 1007   Resp: 15   Weight: 92.1 kg (203 lb)   Height: 157.5 cm (62\")        Body mass index is 37.13 kg/m².      Physical Exam:    General: Alert and oriented x 3.  No acute distress.  Obese.  Normal affect.  HEENT: Pupils equal, round, reactive to light; extraocular movements intact; sclerae nonicteric; pharynx, ear canals and TMs normal.  Neck: Without JVD, thyromegaly, bruit, or adenopathy.  Lungs: Clear to auscultation in all fields.  Heart: Regular rate and rhythm without murmur, rub, gallop, or click.  Abdomen: Soft, nontender, without hepatosplenomegaly or hernia.  Bowel sounds normal.  : Deferred.  Rectal: Deferred.  Extremities: Without clubbing, cyanosis, edema, or pulse deficit.  Neurologic: Intact without focal deficit.  Normal station and gait observed during ingress and egress from the examination room.  Skin: Without significant lesion.  Musculoskeletal: Unremarkable.    Lab/other results:      Assessment/Plan:     Diagnosis Plan   1. Depression with anxiety     2. Anxiety disorder due to general medical condition     3. Memory loss     4. Acute on chronic alteration in " mental status       August 11, 2021--patient seen in follow-up.  Patient is doing better and she reports she has a good therapist.  She tolerated the medication increase well.  She is also had a long talk with her family about some issues that I will go into here.  Overall seems to be making progress.  We do not feel she is quite ready to go back to work.  We will continue with the therapy and reassess the situation and reassess the situation in about a month.  I am not going to make any changes to her regimen right now.      July 14, 2021--patient seen in follow-up.  MRI of the brain reveals no evidence of residual or recurrent meningioma.  Encephalomalacia involving left temporal lobe anteriorly which is similar in appearance compared to the prior examination.  Patient may be some better.  She is sleeping better.  She still somewhat tearful and depressed.  She thinks that the Vraylar may be helping some but has not increased it as far as dosing is concerned.  No one has contacted her regarding neuropsychological testing.  I am going to reorder this.  Plan is as follows: Increase Vraylar to a total of 3 mg/day.  Reorder neuropsychological testing.  Stay on citalopram 20 mg/day.  No other changes.  She can stay on the Xanax as needed.  I will have her follow-up in about 3 to 4 weeks to reassess the situation.  I contacted [facility] and patient can call herself to schedule therapy.  I think she needs that before any definite neuropsychological evaluation.  I do not think were having physical or neurologic issues here.  Patient agrees she thinks that she needs to talk to someone first.    June 25, 2021--patient seen in follow-up and is still obviously depressed and anxious.  She is on 20 mg of citalopram but it is too early to tell if this is more effective than the Effexor.  The Xanax helps with the anxiety as expected.  Patient is still clearly not able to go back to work at this time.  She has her  neuropsychiatric evaluation appointment July 7.  The MRI of the brain is to be done July 2.  Plan is as follows: We will add Vraylar 1.25 mg/day.  Patient may increase this to 3 mg/day if after 2 weeks she feels that it is helping but not quite enough.  I explained to her that 1-1/2 to 3 mg his usual dose that should help as an adjunct to depression treatment in this situation.  She should stay on the citalopram and use the Xanax as needed.  I will have her follow-up July 14 which is previously scheduled.  Patient needs to stay off work until further notice.    Toña 15, 2021--patient reports at least a 6-month history of intermittent and fluctuating loss of memory/recall and occasionally she gets confused such as lost while driving.  She reports she has been stuttering a lot but does not really have any new neurologic symptoms or focal deficits.  She has a history of migraine headaches and takes Inderal for this and this is helped but not totally eliminated her headaches.  She saw one of my partners yesterday regarding this and reports she felt like she did absolutely nothing to help her.  Lab work was ordered which was drawn yesterday.  Patient was started on Effexor Exar 37.5 mg daily along with hydroxyzine 25 mg twice daily and instructions to start weaning Xanax which she has been on about 2-1/2 years.  Patient reports she is under a lot of stress.  I would not go into details but there is some difficulties at work along with difficulties with family.  The situations are the worst that they have ever been.  Plan is as follows: Discontinue Effexor extended release.  Note that patient has not picked up this prescription and I will send a message to the pharmacy to cancel it.  Start citalopram 10 mg/day for the first week to 10 days and then increase to 20 mg/day.  Do not start weaning Xanax.  Change Xanax to 0.5 mg, 1/2-1 p.o. twice daily.  Discontinue Atarax prescription.  Discontinue order for CT scan of the  brain and instead order MRI of the brain.  Neuropsychological testing ordered.  I do not feel that patient is capable to fulfill the duties of her work at the present time and I think she needs to be off for a 2-week duration initially until we can try to get her issues under control.  She may need light duty in the future for a while as well.  Psychotherapy is also a consideration and at the present time I am strongly recommending this.  However, we will hold off until we get the neuropsychological evaluation back.  It may be that someone in that office can do therapy for her as well.  I will have her follow-up in 2 weeks to reassess the situation and determine whether or not she is fit to go back to work.  Work note given.            Procedures

## 2021-09-10 ENCOUNTER — OFFICE VISIT (OUTPATIENT)
Dept: INTERNAL MEDICINE | Facility: CLINIC | Age: 56
End: 2021-09-10

## 2021-09-10 VITALS
DIASTOLIC BLOOD PRESSURE: 72 MMHG | OXYGEN SATURATION: 98 % | BODY MASS INDEX: 37.17 KG/M2 | HEIGHT: 62 IN | WEIGHT: 202 LBS | HEART RATE: 79 BPM | RESPIRATION RATE: 15 BRPM | SYSTOLIC BLOOD PRESSURE: 134 MMHG

## 2021-09-10 DIAGNOSIS — F06.4 ANXIETY DISORDER DUE TO GENERAL MEDICAL CONDITION: Chronic | ICD-10-CM

## 2021-09-10 DIAGNOSIS — R41.3 MEMORY LOSS: ICD-10-CM

## 2021-09-10 DIAGNOSIS — R41.82 ACUTE ON CHRONIC ALTERATION IN MENTAL STATUS: ICD-10-CM

## 2021-09-10 DIAGNOSIS — F41.8 DEPRESSION WITH ANXIETY: Primary | Chronic | ICD-10-CM

## 2021-09-10 PROCEDURE — 99214 OFFICE O/P EST MOD 30 MIN: CPT | Performed by: INTERNAL MEDICINE

## 2021-09-10 NOTE — PROGRESS NOTES
09/10/2021    Patient Information  Ruba Ohara                                                                                          [address] [place] [address]      1965  [unfilled]  [phone] (work)    Chief Complaint:     Follow-up severe episode of depression with anxiety, problems with memory, alteration in mental status.  No new acute complaints.    History of Present Illness:    Patient with a history of hypertension, carotid artery plaque, migraine headaches, history of meningioma, depression with anxiety and generalized anxiety, impaired fasting glucose, hyperlipidemia.  She presents today to follow-up on treatment for severe worsening of depression and anxiety and this will be described below.  Her past medical history reviewed and updated were necessary including health maintenance parameters.  This reveals she is currently up-to-date or else accounted for.    The history regarding severe episode of depression and anxiety initially associated with problems with memory and acute mental status changes:    September 10, 2021--patient seen in follow-up.  The problems with memory and alteration in mental status have resolved.  She is continuing to go to her therapy session every 2 weeks.  This seems to be helping.  She is still not ready to go back to work.  She is currently on short-term disability which will rollover into long-term disability in the near future.  We discussed adjusting her medications, perhaps increasing the right lower and she feels that her medication doses about right at the present time.  I think we need more time and therapy.  Plan is as follows: No change in current medical regimen.  I will have patient follow-up in December with fasting lab prior and we can assess her chronic medical issues at that time.    August 11, 2021--patient seen in follow-up.  Patient is doing better and she reports she has a good therapist.  She tolerated the  medication increase well.  She is also had a long talk with her family about some issues that I will go into here.  Overall seems to be making progress.  We do not feel she is quite ready to go back to work.  We will continue with the therapy and reassess the situation and reassess the situation in about a month.  I am not going to make any changes to her regimen right now.      July 14, 2021--patient seen in follow-up.  MRI of the brain reveals no evidence of residual or recurrent meningioma.  Encephalomalacia involving left temporal lobe anteriorly which is similar in appearance compared to the prior examination.  Patient may be some better.  She is sleeping better.  She still somewhat tearful and depressed.  She thinks that the Vraylar may be helping some but has not increased it as far as dosing is concerned.  No one has contacted her regarding neuropsychological testing.  I am going to reorder this.  Plan is as follows: Increase Vraylar to a total of 3 mg/day.  Reorder neuropsychological testing.  Stay on citalopram 20 mg/day.  No other changes.  She can stay on the Xanax as needed.  I will have her follow-up in about 3 to 4 weeks to reassess the situation.  I contacted [facility] and patient can call herself to schedule therapy.  I think she needs that before any definite neuropsychological evaluation.  I do not think were having physical or neurologic issues here.  Patient agrees she thinks that she needs to talk to someone first.    June 25, 2021--patient seen in follow-up and is still obviously depressed and anxious.  She is on 20 mg of citalopram but it is too early to tell if this is more effective than the Effexor.  The Xanax helps with the anxiety as expected.  Patient is still clearly not able to go back to work at this time.  She has her neuropsychiatric evaluation appointment July 7.  The MRI of the brain is to be done July 2.  Plan is as follows: We will add Vraylar 1.25 mg/day.  Patient  may increase this to 3 mg/day if after 2 weeks she feels that it is helping but not quite enough.  I explained to her that 1-1/2 to 3 mg his usual dose that should help as an adjunct to depression treatment in this situation.  She should stay on the citalopram and use the Xanax as needed.  I will have her follow-up July 14 which is previously scheduled.  Patient needs to stay off work until further notice.    Toña 15, 2021--patient reports at least a 6-month history of intermittent and fluctuating loss of memory/recall and occasionally she gets confused such as lost while driving.  She reports she has been stuttering a lot but does not really have any new neurologic symptoms or focal deficits.  She has a history of migraine headaches and takes Inderal for this and this is helped but not totally eliminated her headaches.  She saw one of my partners yesterday regarding this and reports she felt like she did absolutely nothing to help her.  Lab work was ordered which was drawn yesterday.  Patient was started on Effexor Exar 37.5 mg daily along with hydroxyzine 25 mg twice daily and instructions to start weaning Xanax which she has been on about 2-1/2 years.  Patient reports she is under a lot of stress.  I would not go into details but there is some difficulties at work along with difficulties with family.  The situations are the worst that they have ever been.  Plan is as follows: Discontinue Effexor extended release.  Note that patient has not picked up this prescription and I will send a message to the pharmacy to cancel it.  Start citalopram 10 mg/day for the first week to 10 days and then increase to 20 mg/day.  Do not start weaning Xanax.  Change Xanax to 0.5 mg, 1/2-1 p.o. twice daily.  Discontinue Atarax prescription.  Discontinue order for CT scan of the brain and instead order MRI of the brain.  Neuropsychological testing ordered.  I do not feel that patient is capable to fulfill the duties of her work at the  present time and I think she needs to be off for a 2-week duration initially until we can try to get her issues under control.  She may need light duty in the future for a while as well.  Psychotherapy is also a consideration and at the present time I am strongly recommending this.  However, we will hold off until we get the neuropsychological evaluation back.  It may be that someone in that office can do therapy for her as well.  I will have her follow-up in 2 weeks to reassess the situation and determine whether or not she is fit to go back to work.  Work note given.    Review of Systems   Constitutional: Negative.   HENT: Negative.    Eyes: Negative.    Cardiovascular: Negative.    Respiratory: Negative.    Endocrine: Negative.    Hematologic/Lymphatic: Negative.    Skin: Negative.    Musculoskeletal: Negative.    Gastrointestinal: Negative.    Genitourinary: Negative.    Neurological: Negative.    Psychiatric/Behavioral: Positive for altered mental status and depression. Negative for memory loss. The patient is nervous/anxious.    Allergic/Immunologic: Negative.        Active Problems:    Patient Active Problem List   Diagnosis   • Anxiety disorder due to general medical condition   • Benign essential hypertension   • Carotid artery plaque, 12/30/2019--negative MRA neck.  02/01/2018--16-49% bilateral.  10/29/2013--right ICA plaque.  Normal left ICA.   • Hirsutism   • Hyperlipidemia   • Migraine headaches   • Vitamin D deficiency   • Therapeutic drug monitoring   • Routine physical examination   • History of meningioma   • Depression with anxiety   • Morbid obesity (CMS/HCC)   • Impaired fasting glucose   • Hypoactive sexual desire disorder         Past Medical History:   Diagnosis Date   • Anxiety disorder due to general medical condition 4/14/2016   • Benign essential hypertension 4/12/2006 04/12/2006--treatment for hypertension begun.   • Carotid artery plaque, 12/30/2019--negative MRA neck.   02/01/2018--16-49% bilateral.  10/29/2013--right ICA plaque.  Normal left ICA. 10/29/2013    December 30, 2019--MRA of the head and neck negative.  February 1, 2018--carotid Doppler study revealed 16-49% bilateral carotid artery plaque.  10/29/2013--carotid Doppler revealed plaque without stenosis in the right internal carotid artery. There was no evidence of hemodynamically significant stenosis in the left carotid system. Antegrade flow was present in the left vertebral artery.   • Depression with anxiety 4/26/2016   • Hirsutism 4/14/2016   • History of iron deficiency anemia 07/21/2015 07/21/2015--patient was admitted briefly to the hospital with a hemoglobin of 7.3. This was believed to be due to an episode of rectal bleeding, but primarily due to menorrhagia.   • History of meningioma 08/19/2014 08/19/2014--MRI again performed for left sided facial and arm numbness and tingling.  This reveals status post resection of a meningioma via a left temporoparietal craniotomy.  There is no evidence to suggest residual or recurrent tumor.  Again noted is a focus of encephalomalacia within the anterior portion of the left temporal lobe deep to the craniotomy flap.  10/24/2013--MRI of the brain perfo   • History of Seizure disorder, grand mal 4/14/2016    This was related to a benign brain tumor of the left temporal lobe that is believed to be a meningioma. Status post resection and no recurrence of the seizures.   • Hyperlipidemia 2/7/2012 02/07/2012--treatment for hyperlipidemia begun.   • Hypoactive sexual desire disorder 6/13/2019   • Impaired fasting glucose 4/29/2019 April 29, 2019--routine follow-up.  Fasting serum glucose elevated at 113.  Recommend decreased carbohydrate diet, exercise, weight loss.   • Migraine headaches 10/22/2013    08/19/2014--MRI again performed for left sided facial and arm numbness and tingling.  This reveals status post resection of a meningioma via a left temporoparietal  craniotomy.  There is no evidence to suggest residual or recurrent tumor.  Again noted is a focus of encephalomalacia within the anterior portion of the left temporal lobe deep to the craniotomy flap.  10/29/2013--echocardiogram performed for possible TIA symptoms revealed normal left ventricular size thickness and function. There were no significant valvular abnormalities.  10/24/2013--MRI performed for possible TIA. Prior large 10 cm lateral left temporoparietal craniotomy and an additional 3.5 cm mid to inferior left  temporal craniectomy  by history resection of a meningioma. There is encephalomalacia in anterior inferior left temporal lobe which tracks approximately 3 x 2.1 cm. The remainder of the brain parenchyma is normal in signal intensity. Ventricles are normal. No mass effect noted. No midline shift and no extra-axial fluid collecti   • Vitamin D deficiency 4/14/2016         Past Surgical History:   Procedure Laterality Date   • BRAIN MENINGIOMA EXCISION  26 years old    26 years of age--status post resection of benign brain tumor believed to be a meningioma. Located in the anterior aspect of the left temporal lobe. 10/24/2013--MRI of the brain performed for possible TIA revealed surgical changes of a lateral left temporal parietal craniotomy as well as mid to inferior squamosal left temporal craniotomy consistent with patient's previous surgery. There was a 3.5 x   • COLONOSCOPY  08/20/2015 08/20/2015--entirely normal screening colonoscopy.   • LAPAROSCOPIC HYSTERECTOMY  2010 or so    due to abnormal bleeding due to fibroids, done at [facility], unable to recall physician   • TUBAL ABDOMINAL LIGATION  28 years old    28 years old--tubal ligation.         No Known Allergies        Current Outpatient Medications:   •  ALPRAZolam (XANAX) 0.5 MG tablet, Take 0.5-1 tablets by mouth 2 (Two) Times a Day As Needed for anxiety, Disp: 60 tablet, Rfl: 3  •  aspirin  MG EC tablet, Take  by mouth daily., Disp:  ", Rfl:   •  atorvastatin (Lipitor) 80 MG tablet, Take 1 tablet by mouth Daily., Disp: 90 tablet, Rfl: 4  •  Cariprazine HCl (Vraylar) 3 MG capsule capsule, Take 1 capsule by mouth Daily., Disp: 30 capsule, Rfl: 5  •  Cholecalciferol (VITAMIN D3) 5000 UNITS capsule capsule, 1 by mouth daily as directed, Disp: 30 capsule, Rfl: 11  •  citalopram (CeleXA) 20 MG tablet, Take 1 tablet by mouth nightly for depression and anxiety., Disp: 30 tablet, Rfl: 6  •  olmesartan-hydrochlorothiazide (Benicar HCT) 40-25 MG per tablet, Take 1 tablet by mouth Daily for blood pressure., Disp: 90 tablet, Rfl: 1  •  propranolol (INDERAL) 20 MG tablet, Take 1 tablet by mouth 3 times a day for migraine headaches, Disp: 270 tablet, Rfl: 1      Family History   Problem Relation Age of Onset   • Throat cancer Father    • Stroke Father    • Hypertension Other         Benign Essential   • Stroke Brother    • Breast cancer Neg Hx    • Ovarian cancer Neg Hx    • Uterine cancer Neg Hx    • Colon cancer Neg Hx    • Pancreatic cancer Neg Hx          Social History     Socioeconomic History   • Marital status: Legally      Spouse name: Not on file   • Number of children: Not on file   • Years of education: Not on file   • Highest education level: Not on file   Tobacco Use   • Smoking status: Never Smoker   • Smokeless tobacco: Never Used   Vaping Use   • Vaping Use: Never used   Substance and Sexual Activity   • Alcohol use: Yes     Comment: Socially   • Drug use: No   • Sexual activity: Yes     Partners: Male     Birth control/protection: Surgical     Comment: hysterectomy          Vitals:    09/10/21 0852   BP: 134/72   Pulse: 79   Resp: 15   SpO2: 98%   Weight: 91.6 kg (202 lb)   Height: 157.5 cm (62\")        Body mass index is 36.95 kg/m².      Physical Exam:    General: Alert and oriented x 3.  No acute distress.  Obese.  Normal affect.  HEENT: Pupils equal, round, reactive to light; extraocular movements intact; sclerae nonicteric; " pharynx, ear canals and TMs normal.  Neck: Without JVD, thyromegaly, bruit, or adenopathy.  Lungs: Clear to auscultation in all fields.  Heart: Regular rate and rhythm without murmur, rub, gallop, or click.  Abdomen: Soft, nontender, without hepatosplenomegaly or hernia.  Bowel sounds normal.  : Deferred.  Rectal: Deferred.  Extremities: Without clubbing, cyanosis, edema, or pulse deficit.  Neurologic: Intact without focal deficit.  Normal station and gait observed during ingress and egress from the examination room.  Skin: Without significant lesion.  Musculoskeletal: Unremarkable.    Lab/other results:      Assessment/Plan:     Diagnosis Plan   1. Depression with anxiety     2. Anxiety disorder due to general medical condition     3. Memory loss     4. Acute on chronic alteration in mental status       September 10, 2021--patient seen in follow-up.  The problems with memory and alteration in mental status have resolved.  She is continuing to go to her therapy session every 2 weeks.  This seems to be helping.  She is still not ready to go back to work.  She is currently on short-term disability which will rollover into long-term disability in the near future.  We discussed adjusting her medications, perhaps increasing the right lower and she feels that her medication doses about right at the present time.  I think we need more time and therapy.  Plan is as follows: No change in current medical regimen.  I will have patient follow-up in December with fasting lab prior and we can assess her chronic medical issues at that time.    August 11, 2021--patient seen in follow-up.  Patient is doing better and she reports she has a good therapist.  She tolerated the medication increase well.  She is also had a long talk with her family about some issues that I will go into here.  Overall seems to be making progress.  We do not feel she is quite ready to go back to work.  We will continue with the therapy and reassess the  situation and reassess the situation in about a month.  I am not going to make any changes to her regimen right now.      July 14, 2021--patient seen in follow-up.  MRI of the brain reveals no evidence of residual or recurrent meningioma.  Encephalomalacia involving left temporal lobe anteriorly which is similar in appearance compared to the prior examination.  Patient may be some better.  She is sleeping better.  She still somewhat tearful and depressed.  She thinks that the Vraylar may be helping some but has not increased it as far as dosing is concerned.  No one has contacted her regarding neuropsychological testing.  I am going to reorder this.  Plan is as follows: Increase Vraylar to a total of 3 mg/day.  Reorder neuropsychological testing.  Stay on citalopram 20 mg/day.  No other changes.  She can stay on the Xanax as needed.  I will have her follow-up in about 3 to 4 weeks to reassess the situation.  I contacted [facility] and patient can call herself to schedule therapy.  I think she needs that before any definite neuropsychological evaluation.  I do not think were having physical or neurologic issues here.  Patient agrees she thinks that she needs to talk to someone first.    June 25, 2021--patient seen in follow-up and is still obviously depressed and anxious.  She is on 20 mg of citalopram but it is too early to tell if this is more effective than the Effexor.  The Xanax helps with the anxiety as expected.  Patient is still clearly not able to go back to work at this time.  She has her neuropsychiatric evaluation appointment July 7.  The MRI of the brain is to be done July 2.  Plan is as follows: We will add Vraylar 1.25 mg/day.  Patient may increase this to 3 mg/day if after 2 weeks she feels that it is helping but not quite enough.  I explained to her that 1-1/2 to 3 mg his usual dose that should help as an adjunct to depression treatment in this situation.  She should stay on the citalopram  and use the Xanax as needed.  I will have her follow-up July 14 which is previously scheduled.  Patient needs to stay off work until further notice.    Toña 15, 2021--patient reports at least a 6-month history of intermittent and fluctuating loss of memory/recall and occasionally she gets confused such as lost while driving.  She reports she has been stuttering a lot but does not really have any new neurologic symptoms or focal deficits.  She has a history of migraine headaches and takes Inderal for this and this is helped but not totally eliminated her headaches.  She saw one of my partners yesterday regarding this and reports she felt like she did absolutely nothing to help her.  Lab work was ordered which was drawn yesterday.  Patient was started on Effexor Exar 37.5 mg daily along with hydroxyzine 25 mg twice daily and instructions to start weaning Xanax which she has been on about 2-1/2 years.  Patient reports she is under a lot of stress.  I would not go into details but there is some difficulties at work along with difficulties with family.  The situations are the worst that they have ever been.  Plan is as follows: Discontinue Effexor extended release.  Note that patient has not picked up this prescription and I will send a message to the pharmacy to cancel it.  Start citalopram 10 mg/day for the first week to 10 days and then increase to 20 mg/day.  Do not start weaning Xanax.  Change Xanax to 0.5 mg, 1/2-1 p.o. twice daily.  Discontinue Atarax prescription.  Discontinue order for CT scan of the brain and instead order MRI of the brain.  Neuropsychological testing ordered.  I do not feel that patient is capable to fulfill the duties of her work at the present time and I think she needs to be off for a 2-week duration initially until we can try to get her issues under control.  She may need light duty in the future for a while as well.  Psychotherapy is also a consideration and at the present time I am  strongly recommending this.  However, we will hold off until we get the neuropsychological evaluation back.  It may be that someone in that office can do therapy for her as well.  I will have her follow-up in 2 weeks to reassess the situation and determine whether or not she is fit to go back to work.  Work note given.        Procedures

## 2021-10-06 ENCOUNTER — HOSPITAL ENCOUNTER (EMERGENCY)
Facility: HOSPITAL | Age: 56
Discharge: HOME | End: 2021-10-06
Attending: EMERGENCY MEDICINE | Admitting: EMERGENCY MEDICINE

## 2021-10-06 ENCOUNTER — APPOINTMENT (OUTPATIENT)
Dept: CT IMAGING | Facility: HOSPITAL | Age: 56
End: 2021-10-06

## 2021-10-06 VITALS
RESPIRATION RATE: 16 BRPM | HEIGHT: 62 IN | DIASTOLIC BLOOD PRESSURE: 67 MMHG | BODY MASS INDEX: 37.54 KG/M2 | OXYGEN SATURATION: 97 % | SYSTOLIC BLOOD PRESSURE: 119 MMHG | WEIGHT: 204 LBS | HEART RATE: 61 BPM | TEMPERATURE: 96.9 F

## 2021-10-06 DIAGNOSIS — R20.2 PARESTHESIA OF LEFT UPPER AND LOWER EXTREMITY: Primary | ICD-10-CM

## 2021-10-06 LAB
ALBUMIN SERPL-MCNC: 4.6 G/DL (ref 3.5–5.2)
ALBUMIN/GLOB SERPL: 1.6 G/DL
ALP SERPL-CCNC: 80 U/L (ref 39–117)
ALT SERPL W P-5'-P-CCNC: 28 U/L (ref 1–33)
ANION GAP SERPL CALCULATED.3IONS-SCNC: 11.3 MMOL/L (ref 5–15)
AST SERPL-CCNC: 20 U/L (ref 1–32)
BASOPHILS # BLD AUTO: 0.06 10*3/MM3 (ref 0–0.2)
BASOPHILS NFR BLD AUTO: 0.8 % (ref 0–1.5)
BILIRUB SERPL-MCNC: 0.5 MG/DL (ref 0–1.2)
BUN SERPL-MCNC: 12 MG/DL (ref 6–20)
BUN/CREAT SERPL: 12.9 (ref 7–25)
CALCIUM SPEC-SCNC: 10.1 MG/DL (ref 8.6–10.5)
CHLORIDE SERPL-SCNC: 103 MMOL/L (ref 98–107)
CO2 SERPL-SCNC: 27.7 MMOL/L (ref 22–29)
CREAT SERPL-MCNC: 0.93 MG/DL (ref 0.57–1)
DEPRECATED RDW RBC AUTO: 42.3 FL (ref 37–54)
EOSINOPHIL # BLD AUTO: 0.09 10*3/MM3 (ref 0–0.4)
EOSINOPHIL NFR BLD AUTO: 1.3 % (ref 0.3–6.2)
ERYTHROCYTE [DISTWIDTH] IN BLOOD BY AUTOMATED COUNT: 13.1 % (ref 12.3–15.4)
GFR SERPL CREATININE-BSD FRML MDRD: 75 ML/MIN/1.73
GLOBULIN UR ELPH-MCNC: 2.9 GM/DL
GLUCOSE BLDC GLUCOMTR-MCNC: 116 MG/DL (ref 70–130)
GLUCOSE SERPL-MCNC: 103 MG/DL (ref 65–99)
HCT VFR BLD AUTO: 40.6 % (ref 34–46.6)
HGB BLD-MCNC: 13.9 G/DL (ref 12–15.9)
IMM GRANULOCYTES # BLD AUTO: 0.01 10*3/MM3 (ref 0–0.05)
IMM GRANULOCYTES NFR BLD AUTO: 0.1 % (ref 0–0.5)
LYMPHOCYTES # BLD AUTO: 2.29 10*3/MM3 (ref 0.7–3.1)
LYMPHOCYTES NFR BLD AUTO: 32 % (ref 19.6–45.3)
MCH RBC QN AUTO: 30.3 PG (ref 26.6–33)
MCHC RBC AUTO-ENTMCNC: 34.2 G/DL (ref 31.5–35.7)
MCV RBC AUTO: 88.5 FL (ref 79–97)
MONOCYTES # BLD AUTO: 0.49 10*3/MM3 (ref 0.1–0.9)
MONOCYTES NFR BLD AUTO: 6.8 % (ref 5–12)
NEUTROPHILS NFR BLD AUTO: 4.22 10*3/MM3 (ref 1.7–7)
NEUTROPHILS NFR BLD AUTO: 59 % (ref 42.7–76)
NRBC BLD AUTO-RTO: 0 /100 WBC (ref 0–0.2)
PLATELET # BLD AUTO: 325 10*3/MM3 (ref 140–450)
PMV BLD AUTO: 10.5 FL (ref 6–12)
POTASSIUM SERPL-SCNC: 3.8 MMOL/L (ref 3.5–5.2)
PROT SERPL-MCNC: 7.5 G/DL (ref 6–8.5)
QT INTERVAL: 403 MS
RBC # BLD AUTO: 4.59 10*6/MM3 (ref 3.77–5.28)
SARS-COV-2 RNA PNL SPEC NAA+PROBE: NOT DETECTED
SODIUM SERPL-SCNC: 142 MMOL/L (ref 136–145)
TROPONIN T SERPL-MCNC: <0.01 NG/ML (ref 0–0.03)
WBC # BLD AUTO: 7.16 10*3/MM3 (ref 3.4–10.8)

## 2021-10-06 PROCEDURE — 85025 COMPLETE CBC W/AUTO DIFF WBC: CPT | Performed by: PHYSICIAN ASSISTANT

## 2021-10-06 PROCEDURE — 93005 ELECTROCARDIOGRAM TRACING: CPT | Performed by: PHYSICIAN ASSISTANT

## 2021-10-06 PROCEDURE — 25010000002 LEVETIRACETAM IN NACL 0.82% 500 MG/100ML SOLUTION: Performed by: PHYSICIAN ASSISTANT

## 2021-10-06 PROCEDURE — 84484 ASSAY OF TROPONIN QUANT: CPT | Performed by: PHYSICIAN ASSISTANT

## 2021-10-06 PROCEDURE — 25010000002 MAGNESIUM SULFATE 2 GM/50ML SOLUTION: Performed by: PHYSICIAN ASSISTANT

## 2021-10-06 PROCEDURE — 96365 THER/PROPH/DIAG IV INF INIT: CPT

## 2021-10-06 PROCEDURE — 25010000002 PROCHLORPERAZINE 10 MG/2ML SOLUTION: Performed by: PHYSICIAN ASSISTANT

## 2021-10-06 PROCEDURE — 80053 COMPREHEN METABOLIC PANEL: CPT | Performed by: PHYSICIAN ASSISTANT

## 2021-10-06 PROCEDURE — 99283 EMERGENCY DEPT VISIT LOW MDM: CPT | Performed by: PSYCHIATRY & NEUROLOGY

## 2021-10-06 PROCEDURE — 87635 SARS-COV-2 COVID-19 AMP PRB: CPT | Performed by: PHYSICIAN ASSISTANT

## 2021-10-06 PROCEDURE — 96366 THER/PROPH/DIAG IV INF ADDON: CPT

## 2021-10-06 PROCEDURE — 25010000002 DIPHENHYDRAMINE PER 50 MG: Performed by: PHYSICIAN ASSISTANT

## 2021-10-06 PROCEDURE — 99284 EMERGENCY DEPT VISIT MOD MDM: CPT

## 2021-10-06 PROCEDURE — 96375 TX/PRO/DX INJ NEW DRUG ADDON: CPT

## 2021-10-06 PROCEDURE — 93010 ELECTROCARDIOGRAM REPORT: CPT | Performed by: INTERNAL MEDICINE

## 2021-10-06 PROCEDURE — 82962 GLUCOSE BLOOD TEST: CPT

## 2021-10-06 RX ORDER — MAGNESIUM SULFATE HEPTAHYDRATE 40 MG/ML
2 INJECTION, SOLUTION INTRAVENOUS ONCE
Status: COMPLETED | OUTPATIENT
Start: 2021-10-06 | End: 2021-10-06

## 2021-10-06 RX ORDER — DIPHENHYDRAMINE HYDROCHLORIDE 50 MG/ML
25 INJECTION INTRAMUSCULAR; INTRAVENOUS ONCE
Status: COMPLETED | OUTPATIENT
Start: 2021-10-06 | End: 2021-10-06

## 2021-10-06 RX ORDER — PROCHLORPERAZINE EDISYLATE 5 MG/ML
10 INJECTION INTRAMUSCULAR; INTRAVENOUS ONCE
Status: COMPLETED | OUTPATIENT
Start: 2021-10-06 | End: 2021-10-06

## 2021-10-06 RX ORDER — SODIUM CHLORIDE 0.9 % (FLUSH) 0.9 %
10 SYRINGE (ML) INJECTION AS NEEDED
Status: DISCONTINUED | OUTPATIENT
Start: 2021-10-06 | End: 2021-10-06 | Stop reason: HOSPADM

## 2021-10-06 RX ORDER — HEPARIN SODIUM 10000 [USP'U]/100ML
10.8 INJECTION, SOLUTION INTRAVENOUS
Status: DISCONTINUED | OUTPATIENT
Start: 2021-10-06 | End: 2021-10-06

## 2021-10-06 RX ORDER — LEVETIRACETAM 5 MG/ML
500 INJECTION INTRAVASCULAR EVERY 12 HOURS SCHEDULED
Status: DISCONTINUED | OUTPATIENT
Start: 2021-10-06 | End: 2021-10-06 | Stop reason: HOSPADM

## 2021-10-06 RX ADMIN — PROCHLORPERAZINE EDISYLATE 10 MG: 5 INJECTION INTRAMUSCULAR; INTRAVENOUS at 14:45

## 2021-10-06 RX ADMIN — LEVETIRACETAM 500 MG: 5 INJECTION INTRAVASCULAR at 14:43

## 2021-10-06 RX ADMIN — DIPHENHYDRAMINE HYDROCHLORIDE 25 MG: 50 INJECTION, SOLUTION INTRAMUSCULAR; INTRAVENOUS at 14:44

## 2021-10-06 RX ADMIN — MAGNESIUM SULFATE HEPTAHYDRATE 2 G: 40 INJECTION, SOLUTION INTRAVENOUS at 14:43

## 2021-10-06 NOTE — ED PROVIDER NOTES
EMERGENCY DEPARTMENT ENCOUNTER    Room Number:  06/06  Date of encounter:  10/6/2021  PCP: Víctor Pike MD  Historian: Patient, spouse      I used full protective equipment while examining this patient.  This includes face mask, gloves and protective eyewear.  I washed my hands before entering the room and immediately upon leaving the room      HPI:  Chief Complaint: Left-sided tingling, memory issues  A complete HPI/ROS/PMH/PSH/SH/FH are unobtainable due to: Nothing    Context: Ruba Ohara is a 56 y.o. female who presents to the ED c/o 2-week history of left upper extremity and left lower extremity tingling, paresthesias.  Patient states the symptoms have been intermittent without any precipitating or alleviating factors.  She does have a history of a TIA, anxiety, hyperlipidemia, hypertension.  In addition patient states she is had several months worth of memory issues.  She does note mild weakness in the left arm.    Review of Medical Records  Reviewed his CTA of the head with and without contrast from 6/14/2021.  This was normal.    PAST MEDICAL HISTORY  Active Ambulatory Problems     Diagnosis Date Noted   • Anxiety disorder due to general medical condition 04/14/2016   • Benign essential hypertension 04/12/2006   • Carotid artery plaque, 12/30/2019--negative MRA neck.  02/01/2018--16-49% bilateral.  10/29/2013--right ICA plaque.  Normal left ICA. 10/29/2013   • Hirsutism 04/14/2016   • Hyperlipidemia 02/07/2012   • Migraine headaches 10/22/2013   • Vitamin D deficiency 04/14/2016   • Therapeutic drug monitoring 04/25/2016   • Routine physical examination 04/25/2016   • History of meningioma 04/26/2016   • Depression with anxiety 04/26/2016   • Morbid obesity (HCC) 02/25/2019   • Impaired fasting glucose 04/29/2019   • Hypoactive sexual desire disorder 06/13/2019     Resolved Ambulatory Problems     Diagnosis Date Noted   • History of transient cerebral ischemia 04/14/2016   • History of Doppler  ultrasound of Artery: Carotid 04/14/2016   • History of echocardiogram 04/14/2016   • History of mammogram 04/14/2016   • History of Iron deficiency anemia due to chronic blood loss 04/14/2016   • History of Seizure disorder, grand mal 04/14/2016   • Complicated migraine 10/22/2013   • Family history of throat cancer 11/21/2016   • Noncompliance 04/29/2019   • Benign paroxysmal positional vertigo due to bilateral vestibular disorder 07/09/2019   • New daily persistent headache 07/09/2019   • Dizziness 07/09/2019   • Bilious vomiting with nausea 07/09/2019   • Dysarthria 12/09/2019   • Acute left-sided weakness 12/09/2019   • Bicipital tendinitis of left shoulder 12/09/2019   • Memory loss 06/15/2021   • Acute on chronic alteration in mental status 06/15/2021     Past Medical History:   Diagnosis Date   • History of iron deficiency anemia 07/21/2015   • Hyperlipidemia 2/7/2012         PAST SURGICAL HISTORY  Past Surgical History:   Procedure Laterality Date   • BRAIN MENINGIOMA EXCISION  26 years old    26 years of age--status post resection of benign brain tumor believed to be a meningioma. Located in the anterior aspect of the left temporal lobe. 10/24/2013--MRI of the brain performed for possible TIA revealed surgical changes of a lateral left temporal parietal craniotomy as well as mid to inferior squamosal left temporal craniotomy consistent with patient's previous surgery. There was a 3.5 x   • COLONOSCOPY  08/20/2015 08/20/2015--entirely normal screening colonoscopy.   • LAPAROSCOPIC HYSTERECTOMY  2010 or so    due to abnormal bleeding due to fibroids, done at [facility], unable to recall physician   • TUBAL ABDOMINAL LIGATION  28 years old    28 years old--tubal ligation.         FAMILY HISTORY  Family History   Problem Relation Age of Onset   • Throat cancer Father    • Stroke Father    • Hypertension Other         Benign Essential   • Stroke Brother    • Breast cancer Neg Hx    • Ovarian cancer Neg Hx    •  Uterine cancer Neg Hx    • Colon cancer Neg Hx    • Pancreatic cancer Neg Hx          SOCIAL HISTORY  Social History     Socioeconomic History   • Marital status: Legally      Spouse name: Not on file   • Number of children: Not on file   • Years of education: Not on file   • Highest education level: Not on file   Tobacco Use   • Smoking status: Never Smoker   • Smokeless tobacco: Never Used   Vaping Use   • Vaping Use: Never used   Substance and Sexual Activity   • Alcohol use: Yes     Comment: Socially   • Drug use: No   • Sexual activity: Yes     Partners: Male     Birth control/protection: Surgical     Comment: hysterectomy          ALLERGIES  Patient has no known allergies.        REVIEW OF SYSTEMS  All systems reviewed and negative except for those discussed in HPI.       PHYSICAL EXAM    I have reviewed the triage vital signs and nursing notes.    ED Triage Vitals   Temp Heart Rate Resp BP SpO2   10/06/21 1017 10/06/21 1017 10/06/21 1017 10/06/21 1017 10/06/21 1017   96.9 °F (36.1 °C) 57 16 157/96 100 %      Temp src Heart Rate Source Patient Position BP Location FiO2 (%)   -- 10/06/21 1047 10/06/21 1047 10/06/21 1047 --    Monitor Lying Right arm        Physical Exam  GENERAL: Alert, oriented, not distressed  HENT: head atraumatic, no nuchal rigidity  EYES: no scleral icterus, EOMI  CV: regular rhythm, regular rate, no murmur  RESPIRATORY: normal effort, CTA  ABDOMEN: soft, nontender  MUSCULOSKELETAL: no deformity, FROM, no calf swelling or tenderness  NEURO: alert, normal cerebellar function, cranial nerves II through XII grossly intact.  Mildly decreased sensation to left upper and lower extremities.  4/5 strength in handgrip to left.  SKIN: warm, dry        LAB RESULTS  Recent Results (from the past 24 hour(s))   POC Glucose Once    Collection Time: 10/06/21 10:44 AM    Specimen: Blood   Result Value Ref Range    Glucose 116 70 - 130 mg/dL   Comprehensive Metabolic Panel    Collection Time:  10/06/21 11:41 AM    Specimen: Blood   Result Value Ref Range    Glucose 103 (H) 65 - 99 mg/dL    BUN 12 6 - 20 mg/dL    Creatinine 0.93 0.57 - 1.00 mg/dL    Sodium 142 136 - 145 mmol/L    Potassium 3.8 3.5 - 5.2 mmol/L    Chloride 103 98 - 107 mmol/L    CO2 27.7 22.0 - 29.0 mmol/L    Calcium 10.1 8.6 - 10.5 mg/dL    Total Protein 7.5 6.0 - 8.5 g/dL    Albumin 4.60 3.50 - 5.20 g/dL    ALT (SGPT) 28 1 - 33 U/L    AST (SGOT) 20 1 - 32 U/L    Alkaline Phosphatase 80 39 - 117 U/L    Total Bilirubin 0.5 0.0 - 1.2 mg/dL    eGFR  African Amer 75 >60 mL/min/1.73    Globulin 2.9 gm/dL    A/G Ratio 1.6 g/dL    BUN/Creatinine Ratio 12.9 7.0 - 25.0    Anion Gap 11.3 5.0 - 15.0 mmol/L   Troponin    Collection Time: 10/06/21 11:41 AM    Specimen: Blood   Result Value Ref Range    Troponin T <0.010 0.000 - 0.030 ng/mL   CBC Auto Differential    Collection Time: 10/06/21 11:41 AM    Specimen: Blood   Result Value Ref Range    WBC 7.16 3.40 - 10.80 10*3/mm3    RBC 4.59 3.77 - 5.28 10*6/mm3    Hemoglobin 13.9 12.0 - 15.9 g/dL    Hematocrit 40.6 34.0 - 46.6 %    MCV 88.5 79.0 - 97.0 fL    MCH 30.3 26.6 - 33.0 pg    MCHC 34.2 31.5 - 35.7 g/dL    RDW 13.1 12.3 - 15.4 %    RDW-SD 42.3 37.0 - 54.0 fl    MPV 10.5 6.0 - 12.0 fL    Platelets 325 140 - 450 10*3/mm3    Neutrophil % 59.0 42.7 - 76.0 %    Lymphocyte % 32.0 19.6 - 45.3 %    Monocyte % 6.8 5.0 - 12.0 %    Eosinophil % 1.3 0.3 - 6.2 %    Basophil % 0.8 0.0 - 1.5 %    Immature Grans % 0.1 0.0 - 0.5 %    Neutrophils, Absolute 4.22 1.70 - 7.00 10*3/mm3    Lymphocytes, Absolute 2.29 0.70 - 3.10 10*3/mm3    Monocytes, Absolute 0.49 0.10 - 0.90 10*3/mm3    Eosinophils, Absolute 0.09 0.00 - 0.40 10*3/mm3    Basophils, Absolute 0.06 0.00 - 0.20 10*3/mm3    Immature Grans, Absolute 0.01 0.00 - 0.05 10*3/mm3    nRBC 0.0 0.0 - 0.2 /100 WBC   COVID-19,[facility] IN-HOUSE CEPHEID/CHRIS NP SWAB IN TRANSPORT MEDIA 8-12 HR TAT - Swab, Nasopharynx    Collection Time: 10/06/21 11:42 AM    Specimen:  Nasopharynx; Swab   Result Value Ref Range    COVID19 Not Detected Not Detected - Ref. Range   ECG 12 Lead    Collection Time: 10/06/21 12:28 PM   Result Value Ref Range    QT Interval 403 ms       Ordered the above labs and independently reviewed the results.        RADIOLOGY  No Radiology Exams Resulted Within Past 24 Hours    I ordered the above noted radiological studies. Reviewed by me and discussed with radiologist.  See dictation for official radiology interpretation.      MEDICATIONS GIVEN IN ER    Medications   sodium chloride 0.9 % flush 10 mL (has no administration in time range)   levETIRAcetam in NaCl 0.82% (KEPPRA) IVPB 500 mg (0 mg Intravenous Stopped 10/6/21 1458)   prochlorperazine (COMPAZINE) injection 10 mg (10 mg Intravenous Given 10/6/21 1445)   diphenhydrAMINE (BENADRYL) injection 25 mg (25 mg Intravenous Given 10/6/21 1444)   magnesium sulfate 2g/50 mL (PREMIX) infusion (2 g Intravenous New Bag 10/6/21 1443)         PROGRESS, DATA ANALYSIS, CONSULTS, AND MEDICAL DECISION MAKING    All labs have been independently reviewed by me.  All radiology studies have been reviewed by me and discussed with radiologist dictating the report.   EKG's independently viewed and interpreted by me.  Discussion below represents my analysis of pertinent findings related to patient's condition, differential diagnosis, treatment plan and final disposition.    I have discussed case with Dr. Garcia, emergency room physician.  He has performed his own bedside examination and agrees with treatment plan.    ED Course as of Oct 06 1704   Wed Oct 06, 2021   1110 Patient presents with 2-week history of left arm and left leg tingling.  Patient is not a TPA or team D candidate given duration of symptoms.  I plan to discuss with stroke neurology.    [EE]   1125 I discussed patient with Dr. Elise, stroke neurologist.  He requests that we order head and neck CTA.      [EE]   1214 WBC: 7.16 [EE]   1214 Hemoglobin: 13.9 [EE]    1214 Creatinine: 0.93 [EE]   1214 Troponin T: <0.010 [EE]   1214 NIH 1 (decreased sensation on left side)    [EE]   1236 COVID19: Not Detected [EE]   1405 Dr. Elise at bedside.  He believes patient symptoms may be more migrainous in nature.  He recommends a migraine cocktail.  He would like to hold off on the CTA of the head neck at this time.  If her symptoms resolve he is comfortable with her being discharged.    [EE]   1530 Recheck of patient.  She states her left arm numbness has resolved, her left leg is still numb.  We will order CTA of the head neck.  Patient does not want to be admitted.    [EE]   1659 Care turned over to Dr. Garcia pending CTA findings.    [EE]      ED Course User Index  [EE] Duglas Frank PA       AS OF 17:04 EDT VITALS:    BP - 129/77  HR - 71  TEMP - 96.9 °F (36.1 °C)  O2 SATS - 98%        DIAGNOSIS  Final diagnoses:   Paresthesia of left upper and lower extremity         DISPOSITION  Pending           Duglas Frank PA  10/06/21 1704

## 2021-10-06 NOTE — ED NOTES
Patient was wearing a face mask throughout our encounter.  I wore a CAPR throughout the encounter.  Hand hygiene was performed before and after patient encounter.        Víctor Covarrubias RN  10/06/21 1039

## 2021-10-06 NOTE — ED TRIAGE NOTES
Pt to ER via EMS from [facility]. Per EMS, pt c/o dizziness x1 week, numbness in left side x2 days that is intermittent. Pt c/o memory issues x6 months as well.       Patient in mask. This RN in appropriate PPE throughout the patient's entire encounter.

## 2021-10-06 NOTE — CONSULTS
Neurology Consult Note    Consult Date: 10/6/2021    Referring MD: Dr. Garcia    Reason for Consult I have been asked to see the patient in neurological consultation to render advice and opinion regarding left sided paresthesias    Ruba Ohara is a 56 y.o. female with past medical history of carotid stenosis, depression, anxiety, reported prior seizure disorder related to left temporal meningioma that was subsequently resected, seizure-free since that time.  She also has longstanding history of migraine without aura.  Currently she has headaches about every other month on prophylactic propranolol.  About 2 months ago she developed intermittent left-sided paresthesias.  She typically experiences this as a tingling sensation in her left leg that gradually radiates up to involve her left face and arm.  Typically the radiation occurs over the course of 1 to 2 minutes.  She feels that the spells are somewhat correlating to an increase in her stress level.  Unfortunately she has had multiple family members and friends passed away this year.  She was worked up for this recently with MRI of the brain which was found to be essentially normal.  She presented to the hospital due to persistence of these episodes without a clear explanation for the etiology.    Past Medical History:   Diagnosis Date   • Anxiety disorder due to general medical condition 4/14/2016   • Benign essential hypertension 4/12/2006 04/12/2006--treatment for hypertension begun.   • Carotid artery plaque, 12/30/2019--negative MRA neck.  02/01/2018--16-49% bilateral.  10/29/2013--right ICA plaque.  Normal left ICA. 10/29/2013    December 30, 2019--MRA of the head and neck negative.  February 1, 2018--carotid Doppler study revealed 16-49% bilateral carotid artery plaque.  10/29/2013--carotid Doppler revealed plaque without stenosis in the right internal carotid artery. There was no evidence of hemodynamically significant stenosis in the left carotid  system. Antegrade flow was present in the left vertebral artery.   • Depression with anxiety 4/26/2016   • Hirsutism 4/14/2016   • History of iron deficiency anemia 07/21/2015 07/21/2015--patient was admitted briefly to the hospital with a hemoglobin of 7.3. This was believed to be due to an episode of rectal bleeding, but primarily due to menorrhagia.   • History of meningioma 08/19/2014 08/19/2014--MRI again performed for left sided facial and arm numbness and tingling.  This reveals status post resection of a meningioma via a left temporoparietal craniotomy.  There is no evidence to suggest residual or recurrent tumor.  Again noted is a focus of encephalomalacia within the anterior portion of the left temporal lobe deep to the craniotomy flap.  10/24/2013--MRI of the brain perfo   • History of Seizure disorder, grand mal 4/14/2016    This was related to a benign brain tumor of the left temporal lobe that is believed to be a meningioma. Status post resection and no recurrence of the seizures.   • Hyperlipidemia 2/7/2012 02/07/2012--treatment for hyperlipidemia begun.   • Hypoactive sexual desire disorder 6/13/2019   • Impaired fasting glucose 4/29/2019 April 29, 2019--routine follow-up.  Fasting serum glucose elevated at 113.  Recommend decreased carbohydrate diet, exercise, weight loss.   • Migraine headaches 10/22/2013    08/19/2014--MRI again performed for left sided facial and arm numbness and tingling.  This reveals status post resection of a meningioma via a left temporoparietal craniotomy.  There is no evidence to suggest residual or recurrent tumor.  Again noted is a focus of encephalomalacia within the anterior portion of the left temporal lobe deep to the craniotomy flap.  10/29/2013--echocardiogram performed for possible TIA symptoms revealed normal left ventricular size thickness and function. There were no significant valvular abnormalities.  10/24/2013--MRI performed for possible TIA.  "Prior large 10 cm lateral left temporoparietal craniotomy and an additional 3.5 cm mid to inferior left  temporal craniectomy  by history resection of a meningioma. There is encephalomalacia in anterior inferior left temporal lobe which tracks approximately 3 x 2.1 cm. The remainder of the brain parenchyma is normal in signal intensity. Ventricles are normal. No mass effect noted. No midline shift and no extra-axial fluid collecti   • Vitamin D deficiency 4/14/2016       ROS:  No fevers, chills  No weakness, + numbness  + Chest pain, occasional palpitations    Exam  /77   Pulse 71   Temp 96.9 °F (36.1 °C)   Resp 16   Ht 157.5 cm (62\")   Wt 92.5 kg (204 lb)   SpO2 98%   BMI 37.31 kg/m²   Gen: NAD, vitals reviewed  Eyes: Fundus normal bilaterally  MS: oriented x3, recent/remote memory intact, normal attention/concentration, language intact, no neglect.  CN: visual acuity grossly normal, PERRL, EOMI, no facial droop, no dysarthria  Motor: 5/5 throughout upper and lower extremities, normal tone  Sensory: Slightly diminished to cold temperature left upper extremity, intact to vibration and light touch throughout  Reflexes: 2+ throughout    DATA:    Lab Results   Component Value Date    GLUCOSE 103 (H) 10/06/2021    CALCIUM 10.1 10/06/2021     10/06/2021    K 3.8 10/06/2021    CO2 27.7 10/06/2021     10/06/2021    BUN 12 10/06/2021    CREATININE 0.93 10/06/2021    EGFRIFAFRI 75 10/06/2021    EGFRIFNONA 56 (L) 04/02/2019    BCR 12.9 10/06/2021    ANIONGAP 11.3 10/06/2021     Lab Results   Component Value Date    WBC 7.16 10/06/2021    HGB 13.9 10/06/2021    HCT 40.6 10/06/2021    MCV 88.5 10/06/2021     10/06/2021       Lab review: CBC, BMP unremarkable    Imaging review: I personally reviewed her most recent brain MRI performed in July which looks essentially normal apart from a single right sided juxtacortical lesion.  She has minimal small vessel disease and evidence of previous " meningioma resection.  Radiology report reviewed    Diagnoses:  Migraine with sensory aura, without status migrainosus, not intractable    Comment: Suspect migraine with sensory aura.  I would recommend trying migraine cocktails in the emergency department.  If symptoms do not resolve I would repeat CTA and MRI.    PLAN:  Migraine cocktail x1 with Compazine, Keppra, Benadryl, magnesium  If symptoms do not resolve CTA and MRI with and without    Discussed with Duglas Frank

## 2021-10-06 NOTE — ED NOTES
Pt wearing mask. Myself had mask, and eye wear/PPE when present with pt. Hand hygiene performed before and after pt care.     Maria De Jesus Tapia, PCT  10/06/21 8454

## 2021-10-06 NOTE — ED PROVIDER NOTES
Brief history of present illness: 56-year-old female with a history of hypertension, hyperlipidemia, anxiety, and TIA presents the emergency department complaining of several days to almost a weeks worth of left-sided numbness, intermittent dizziness with now 2 days of some left-sided arm  strength deficit.  No clear exacerbating or relieving factors identified.  Denies headache at this time.    Physical exam:    ED Triage Vitals   Temp Heart Rate Resp BP SpO2   10/06/21 1017 10/06/21 1017 10/06/21 1017 10/06/21 1017 10/06/21 1017   96.9 °F (36.1 °C) 57 16 157/96 100 %      Temp src Heart Rate Source Patient Position BP Location FiO2 (%)   -- 10/06/21 1047 10/06/21 1047 10/06/21 1047 --    Monitor Lying Right arm      Alert and talkative.  Face symmetric.  Patient is moving all 4 extremities spontaneously but notes sensation change to the left side as compared to the right to light touch.  No respiratory distress or tachypnea.  Pink warm and perfusing.  Normal mood and affect noted.    MDM: Agree with plan for consultation with stroke neurology, laboratory and radiologic evaluation in the ED to assess for acute life threats, potential for admission for further evaluation and treatment.    I have seen and personally evaluated this patient, discussed the case with the treating advanced practice provider, and reviewed their note. I was involved in the medical decision making during the evaluation, testing and disposition planning for this patient.     Jensen Garcia MD  10/06/21 [id number]

## 2021-10-06 NOTE — PROGRESS NOTES
Clinical Pharmacy Services: Medication History    Ruba Ohara is a 56 y.o. female presenting to [facility] for   Chief Complaint   Patient presents with   • Dizziness   • Numbness       She  has a past medical history of Anxiety disorder due to general medical condition (4/14/2016), Benign essential hypertension (4/12/2006), Carotid artery plaque, 12/30/2019--negative MRA neck.  02/01/2018--16-49% bilateral.  10/29/2013--right ICA plaque.  Normal left ICA. (10/29/2013), Depression with anxiety (4/26/2016), Hirsutism (4/14/2016), History of iron deficiency anemia (07/21/2015), History of meningioma (08/19/2014), History of Seizure disorder, grand mal (4/14/2016), Hyperlipidemia (2/7/2012), Hypoactive sexual desire disorder (6/13/2019), Impaired fasting glucose (4/29/2019), Migraine headaches (10/22/2013), and Vitamin D deficiency (4/14/2016).    Allergies as of 10/06/2021   • (No Known Allergies)       Medication information was obtained from: Patient  Pharmacy and Phone Number:     Prior to Admission Medications     Prescriptions Last Dose Informant Patient Reported? Taking?    ALPRAZolam (XANAX) 0.5 MG tablet Past Week Self No Yes    Take 0.5-1 tablets by mouth 2 (Two) Times a Day As Needed for anxiety    aspirin  MG EC tablet 10/6/2021 Self Yes Yes    Take 325 mg by mouth Daily.    Cariprazine HCl (Vraylar) 3 MG capsule capsule 10/6/2021 Self No Yes    Take 1 capsule by mouth Daily.    Cholecalciferol (VITAMIN D3) 5000 UNITS capsule capsule 10/6/2021 Self No Yes    1 by mouth daily as directed    Patient taking differently:  Take 5,000 Units by mouth Daily.    olmesartan-hydrochlorothiazide (Benicar HCT) 40-25 MG per tablet 10/6/2021 Self No Yes    Take 1 tablet by mouth Daily for blood pressure.    propranolol (INDERAL) 20 MG tablet 10/6/2021 Self No Yes    Take 1 tablet by mouth 3 times a day for migraine headaches    Patient taking differently:  Take 20 mg by mouth 3 (Three) Times a  Day.            Medication notes:     This medication list is complete to the best of my knowledge as of 10/6/2021    Please call if questions.    Michael Montero  Medication History Technician  [phone]    10/6/2021 12:38 EDT

## 2021-11-19 DIAGNOSIS — I10 BENIGN ESSENTIAL HYPERTENSION: Chronic | ICD-10-CM

## 2021-11-19 RX ORDER — OLMESARTAN MEDOXOMIL AND HYDROCHLOROTHIAZIDE 40/25 40; 25 MG/1; MG/1
1 TABLET ORAL DAILY
Qty: 90 TABLET | Refills: 1 | Status: SHIPPED | OUTPATIENT
Start: 2021-11-19 | End: 2022-09-08 | Stop reason: SDUPTHER

## 2022-09-08 DIAGNOSIS — I10 BENIGN ESSENTIAL HYPERTENSION: Chronic | ICD-10-CM

## 2022-09-08 RX ORDER — OLMESARTAN MEDOXOMIL AND HYDROCHLOROTHIAZIDE 40/25 40; 25 MG/1; MG/1
1 TABLET ORAL DAILY
Qty: 15 TABLET | Refills: 0 | Status: SHIPPED | OUTPATIENT
Start: 2022-09-08

## 2022-09-26 DIAGNOSIS — I10 BENIGN ESSENTIAL HYPERTENSION: Primary | Chronic | ICD-10-CM

## 2022-09-26 DIAGNOSIS — R73.01 IMPAIRED FASTING GLUCOSE: Chronic | ICD-10-CM

## 2022-09-26 DIAGNOSIS — E78.2 MIXED HYPERLIPIDEMIA: Chronic | ICD-10-CM

## 2022-09-26 DIAGNOSIS — E55.9 VITAMIN D DEFICIENCY: Chronic | ICD-10-CM

## 2022-09-26 DIAGNOSIS — F06.4 ANXIETY DISORDER DUE TO GENERAL MEDICAL CONDITION: Chronic | ICD-10-CM

## 2022-09-26 DIAGNOSIS — Z51.81 THERAPEUTIC DRUG MONITORING: ICD-10-CM

## 2022-09-26 DIAGNOSIS — Z00.00 ROUTINE PHYSICAL EXAMINATION: ICD-10-CM

## 2022-09-26 DIAGNOSIS — I10 BENIGN ESSENTIAL HYPERTENSION: Chronic | ICD-10-CM

## 2022-09-26 PROBLEM — E66.9 NON MORBID OBESITY: Status: ACTIVE | Noted: 2019-02-25

## 2022-09-26 RX ORDER — ALPRAZOLAM 0.5 MG/1
.25-.5 TABLET ORAL 2 TIMES DAILY PRN
Qty: 60 TABLET | Refills: 0 | Status: SHIPPED | OUTPATIENT
Start: 2022-09-26 | End: 2022-09-28 | Stop reason: SDUPTHER

## 2022-09-28 DIAGNOSIS — F06.4 ANXIETY DISORDER DUE TO GENERAL MEDICAL CONDITION: Chronic | ICD-10-CM

## 2022-09-28 LAB
25(OH)D3+25(OH)D2 SERPL-MCNC: 52.2 NG/ML (ref 30–100)
ALBUMIN SERPL-MCNC: 4.3 G/DL (ref 3.8–4.9)
ALBUMIN/GLOB SERPL: 1.7 {RATIO} (ref 1.2–2.2)
ALP SERPL-CCNC: 104 IU/L (ref 44–121)
ALT SERPL-CCNC: 18 IU/L (ref 0–32)
APPEARANCE UR: CLEAR
AST SERPL-CCNC: 16 IU/L (ref 0–40)
BILIRUB SERPL-MCNC: 0.3 MG/DL (ref 0–1.2)
BILIRUB UR QL STRIP: NEGATIVE
BUN SERPL-MCNC: 13 MG/DL (ref 6–24)
BUN/CREAT SERPL: 14 (ref 9–23)
CALCIUM SERPL-MCNC: 9.6 MG/DL (ref 8.7–10.2)
CHLORIDE SERPL-SCNC: 106 MMOL/L (ref 96–106)
CHOLEST SERPL-MCNC: 190 MG/DL (ref 100–199)
CK SERPL-CCNC: 195 U/L (ref 32–182)
CO2 SERPL-SCNC: 22 MMOL/L (ref 20–29)
COLOR UR: YELLOW
CREAT SERPL-MCNC: 0.92 MG/DL (ref 0.57–1)
EGFRCR SERPLBLD CKD-EPI 2021: 72 ML/MIN/1.73
ERYTHROCYTE [DISTWIDTH] IN BLOOD BY AUTOMATED COUNT: 13 % (ref 11.7–15.4)
GLOBULIN SER CALC-MCNC: 2.6 G/DL (ref 1.5–4.5)
GLUCOSE SERPL-MCNC: 98 MG/DL (ref 70–99)
GLUCOSE UR QL STRIP: NEGATIVE
HBA1C MFR BLD: 6 % (ref 4.8–5.6)
HCT VFR BLD AUTO: 39.8 % (ref 34–46.6)
HDL SERPL-SCNC: 20.9 UMOL/L
HDLC SERPL-MCNC: 33 MG/DL
HGB BLD-MCNC: 13.7 G/DL (ref 11.1–15.9)
HGB UR QL STRIP: NEGATIVE
KETONES UR QL STRIP: NEGATIVE
LDL SERPL QN: 21.6 NM
LDL SERPL-SCNC: 1636 NMOL/L
LDL SMALL SERPL-SCNC: 433 NMOL/L
LDLC SERPL CALC-MCNC: 124 MG/DL (ref 0–99)
LEUKOCYTE ESTERASE UR QL STRIP: NEGATIVE
MCH RBC QN AUTO: 30 PG (ref 26.6–33)
MCHC RBC AUTO-ENTMCNC: 34.4 G/DL (ref 31.5–35.7)
MCV RBC AUTO: 87 FL (ref 79–97)
MICRO URNS: NORMAL
NITRITE UR QL STRIP: NEGATIVE
PH UR STRIP: 5.5 [PH] (ref 5–7.5)
PLATELET # BLD AUTO: 280 X10E3/UL (ref 150–450)
POTASSIUM SERPL-SCNC: 4 MMOL/L (ref 3.5–5.2)
PROT SERPL-MCNC: 6.9 G/DL (ref 6–8.5)
PROT UR QL STRIP: NORMAL
RBC # BLD AUTO: 4.56 X10E6/UL (ref 3.77–5.28)
SODIUM SERPL-SCNC: 143 MMOL/L (ref 134–144)
SP GR UR STRIP: 1.03 (ref 1–1.03)
T3FREE SERPL-MCNC: 2.4 PG/ML (ref 2–4.4)
T4 FREE SERPL-MCNC: 1.24 NG/DL (ref 0.82–1.77)
TRIGL SERPL-MCNC: 185 MG/DL (ref 0–149)
TSH SERPL DL<=0.005 MIU/L-ACNC: 0.88 UIU/ML (ref 0.45–4.5)
UROBILINOGEN UR STRIP-MCNC: 0.2 MG/DL (ref 0.2–1)
WBC # BLD AUTO: 6.9 X10E3/UL (ref 3.4–10.8)

## 2022-09-28 RX ORDER — ALPRAZOLAM 0.5 MG/1
.25-.5 TABLET ORAL 2 TIMES DAILY PRN
Qty: 60 TABLET | Refills: 0 | Status: SHIPPED | OUTPATIENT
Start: 2022-09-28 | End: 2022-10-06

## 2022-09-28 NOTE — TELEPHONE ENCOUNTER
Caller: Ruba Ohraa    Relationship: Self    Best call back number: [phone]    Requested Prescriptions:   Requested Prescriptions     Pending Prescriptions Disp Refills   • ALPRAZolam (XANAX) 0.5 MG tablet 60 tablet 0     Sig: Take 0.5-1 tablets by mouth 2 (Two) Times a Day As Needed for anxiety        Pharmacy where request should be sent: [facility] [id number] [facility] [id number] [facility] AT [facility] [phone] [facility] [phone] FX     Does the patient have less than a 3 day supply:  [x] Yes  [] No    Rosenda Barker Rep   09/28/22 15:20 EDT

## 2022-09-28 NOTE — TELEPHONE ENCOUNTER
Caller: Ruba Ohara    Relationship: Self    Best call back number: [phone]     Requested Prescriptions:   Requested Prescriptions     Signed Prescriptions Disp Refills   • ALPRAZolam (XANAX) 0.5 MG tablet 60 tablet 0     Sig: Take 0.5-1 tablets by mouth 2 (Two) Times a Day As Needed for anxiety     Authorizing Provider: YOSELIN OTOOLE        Pharmacy where request should be sent: [facility] [id number] [address] AT [facility] [phone] [facility] [phone] FX     Additional details provided by patient: PATIENT CALLING STATING THAT SHE NO LONGER USES [facility] SHE WOULD LIKE FOR THIS TO BE SENT TO [facility]     Does the patient have less than a 3 day supply:  [] Yes  [x] No    Rosenda Pemberton Rep   09/28/22 08:56 EDT

## 2022-10-06 ENCOUNTER — OFFICE VISIT (OUTPATIENT)
Dept: INTERNAL MEDICINE | Facility: CLINIC | Age: 57
End: 2022-10-06

## 2022-10-06 VITALS
BODY MASS INDEX: 38.46 KG/M2 | WEIGHT: 209 LBS | HEIGHT: 62 IN | HEART RATE: 71 BPM | DIASTOLIC BLOOD PRESSURE: 64 MMHG | OXYGEN SATURATION: 98 % | SYSTOLIC BLOOD PRESSURE: 126 MMHG | RESPIRATION RATE: 18 BRPM

## 2022-10-06 DIAGNOSIS — Z23 NEED FOR INFLUENZA VACCINATION: ICD-10-CM

## 2022-10-06 DIAGNOSIS — Z86.018 HISTORY OF MENINGIOMA: Chronic | ICD-10-CM

## 2022-10-06 DIAGNOSIS — F06.4 ANXIETY DISORDER DUE TO GENERAL MEDICAL CONDITION: Chronic | ICD-10-CM

## 2022-10-06 DIAGNOSIS — Z00.00 ROUTINE PHYSICAL EXAMINATION: Primary | ICD-10-CM

## 2022-10-06 DIAGNOSIS — I65.21 ATHEROSCLEROSIS OF RIGHT CAROTID ARTERY: Chronic | ICD-10-CM

## 2022-10-06 DIAGNOSIS — G43.709 CHRONIC MIGRAINE W/O AURA W/O STATUS MIGRAINOSUS, NOT INTRACTABLE: Chronic | ICD-10-CM

## 2022-10-06 DIAGNOSIS — F41.8 DEPRESSION WITH ANXIETY: Chronic | ICD-10-CM

## 2022-10-06 DIAGNOSIS — Z86.16 HISTORY OF 2019 NOVEL CORONAVIRUS DISEASE (COVID-19): Chronic | ICD-10-CM

## 2022-10-06 DIAGNOSIS — E66.9 NON MORBID OBESITY: Chronic | ICD-10-CM

## 2022-10-06 DIAGNOSIS — I10 BENIGN ESSENTIAL HYPERTENSION: Chronic | ICD-10-CM

## 2022-10-06 DIAGNOSIS — E78.2 MIXED HYPERLIPIDEMIA: Chronic | ICD-10-CM

## 2022-10-06 DIAGNOSIS — Z51.81 THERAPEUTIC DRUG MONITORING: ICD-10-CM

## 2022-10-06 DIAGNOSIS — L68.0 HIRSUTISM: Chronic | ICD-10-CM

## 2022-10-06 DIAGNOSIS — E55.9 VITAMIN D DEFICIENCY: Chronic | ICD-10-CM

## 2022-10-06 DIAGNOSIS — R73.01 IMPAIRED FASTING GLUCOSE: Chronic | ICD-10-CM

## 2022-10-06 DIAGNOSIS — F52.0 HYPOACTIVE SEXUAL DESIRE DISORDER: Chronic | ICD-10-CM

## 2022-10-06 PROCEDURE — 99214 OFFICE O/P EST MOD 30 MIN: CPT | Performed by: INTERNAL MEDICINE

## 2022-10-06 PROCEDURE — 99396 PREV VISIT EST AGE 40-64: CPT | Performed by: INTERNAL MEDICINE

## 2022-10-06 RX ORDER — EZETIMIBE AND SIMVASTATIN 10; 20 MG/1; MG/1
TABLET ORAL
Qty: 90 TABLET | Refills: 2 | Status: SHIPPED | OUTPATIENT
Start: 2022-10-06

## 2022-10-06 RX ORDER — PROPRANOLOL HYDROCHLORIDE 20 MG/1
TABLET ORAL
Qty: 270 TABLET | Refills: 1
Start: 2022-10-06 | End: 2022-10-06

## 2022-10-06 RX ORDER — EZETIMIBE AND SIMVASTATIN 10; 40 MG/1; MG/1
TABLET ORAL
Qty: 90 TABLET | Refills: 3 | Status: SHIPPED | OUTPATIENT
Start: 2022-10-06 | End: 2022-10-06

## 2022-10-06 RX ORDER — LORAZEPAM 1 MG/1
TABLET ORAL
Qty: 30 TABLET | Refills: 1 | Status: SHIPPED | OUTPATIENT
Start: 2022-10-06 | End: 2023-02-14

## 2022-10-06 RX ORDER — PROPRANOLOL HYDROCHLORIDE 120 MG/1
CAPSULE, EXTENDED RELEASE ORAL
Qty: 90 CAPSULE | Refills: 3 | Status: SHIPPED | OUTPATIENT
Start: 2022-10-06

## 2022-10-06 NOTE — PROGRESS NOTES
10/06/2022    Patient Information  Kelli Ohara                                                                                          [address] [facility] [address]      1965  [unfilled]  [phone] (work)    Chief Complaint:     Routine annual physical examination follow-up blood work.  No new acute complaints.    History of Present Illness:    Patient with impaired fasting glucose, hyperlipidemia, hypertension, carotid artery plaque, anxiety disorder, hirsutism, hypoactive sexual desire disorder, depression with anxiety that is stable, vitamin D deficiency, chronic migraine, history of meningioma, nonmorbid obesity.  She presents today for routine annual physical exam and follow-up blood work.  Her past medical history reviewed and updated were necessary including health maintenance parameters.  This reveals she is up-to-date with the exception of influenza and possibly COVID booster.  See below.    Review of Systems   Constitutional: Negative.   HENT: Negative.    Eyes: Negative.    Cardiovascular: Negative.    Respiratory: Negative.    Endocrine: Negative.    Hematologic/Lymphatic: Negative.    Skin: Negative.    Musculoskeletal: Negative.    Gastrointestinal: Negative.    Genitourinary: Negative.    Neurological: Negative.    Psychiatric/Behavioral: Negative.    Allergic/Immunologic: Negative.        Active Problems:    Patient Active Problem List   Diagnosis   • Anxiety disorder due to general medical condition   • Benign essential hypertension   • Carotid artery plaque, 12/30/2019--negative MRA neck.  02/01/2018--16-49% bilateral.  10/29/2013--right ICA plaque.  Normal left ICA.   • Hirsutism   • Hyperlipidemia   • Chronic migraine w/o aura w/o status migrainosus, not intractable   • Vitamin D deficiency   • Therapeutic drug monitoring   • Routine physical examination   • History of meningioma   • Depression with anxiety   • Non morbid obesity   • Impaired fasting  "glucose   • Hypoactive sexual desire disorder   • History of 2019 novel coronavirus disease (COVID-19)         Past Medical History:   Diagnosis Date   • Anxiety disorder due to general medical condition 4/14/2016   • Benign essential hypertension 4/12/2006 04/12/2006--treatment for hypertension begun.   • Carotid artery plaque, 12/30/2019--negative MRA neck.  02/01/2018--16-49% bilateral.  10/29/2013--right ICA plaque.  Normal left ICA. 10/29/2013    December 30, 2019--MRA of the head and neck negative.  February 1, 2018--carotid Doppler study revealed 16-49% bilateral carotid artery plaque.  10/29/2013--carotid Doppler revealed plaque without stenosis in the right internal carotid artery. There was no evidence of hemodynamically significant stenosis in the left carotid system. Antegrade flow was present in the left vertebral artery.   • Chronic migraine w/o aura w/o status migrainosus, not intractable 10/22/2013    December 9, 2019--patient presents with concerns that she may be having \"more TIAs.\".  She is describing numbness in her left foot but no weakness or paresthesias.  No loss of coordination.  She reports that her left upper extremity is \"killing her\" and what she is describing is shooting pain in the left biceps region.  No numbness or paresthesias in the left upper extremity.  She is had no visual   • Depression with anxiety 4/26/2016   • Hirsutism 4/14/2016   • History of 2019 novel coronavirus disease (COVID-19) 10/6/2022    August 2022--patient tested positive for COVID.  Symptoms were very mild and resolved without residual.   • History of iron deficiency anemia 07/21/2015 07/21/2015--patient was admitted briefly to the hospital with a hemoglobin of 7.3. This was believed to be due to an episode of rectal bleeding, but primarily due to menorrhagia.   • History of meningioma 08/19/2014 08/19/2014--MRI again performed for left sided facial and arm numbness and tingling.  This reveals status " post resection of a meningioma via a left temporoparietal craniotomy.  There is no evidence to suggest residual or recurrent tumor.  Again noted is a focus of encephalomalacia within the anterior portion of the left temporal lobe deep to the craniotomy flap.  10/24/2013--MRI of the brain perfo   • History of Seizure disorder, grand mal 4/14/2016    This was related to a benign brain tumor of the left temporal lobe that is believed to be a meningioma. Status post resection and no recurrence of the seizures.   • Hyperlipidemia 2/7/2012 02/07/2012--treatment for hyperlipidemia begun.   • Hypoactive sexual desire disorder 6/13/2019   • Impaired fasting glucose 4/29/2019 April 29, 2019--routine follow-up.  Fasting serum glucose elevated at 113.  Recommend decreased carbohydrate diet, exercise, weight loss.   • Migraine headaches 10/22/2013    08/19/2014--MRI again performed for left sided facial and arm numbness and tingling.  This reveals status post resection of a meningioma via a left temporoparietal craniotomy.  There is no evidence to suggest residual or recurrent tumor.  Again noted is a focus of encephalomalacia within the anterior portion of the left temporal lobe deep to the craniotomy flap.  10/29/2013--echocardiogram performed for possible TIA symptoms revealed normal left ventricular size thickness and function. There were no significant valvular abnormalities.  10/24/2013--MRI performed for possible TIA. Prior large 10 cm lateral left temporoparietal craniotomy and an additional 3.5 cm mid to inferior left  temporal craniectomy  by history resection of a meningioma. There is encephalomalacia in anterior inferior left temporal lobe which tracks approximately 3 x 2.1 cm. The remainder of the brain parenchyma is normal in signal intensity. Ventricles are normal. No mass effect noted. No midline shift and no extra-axial fluid collecti   • Non morbid obesity 2/25/2019   • Vitamin D deficiency 4/14/2016          Past Surgical History:   Procedure Laterality Date   • BRAIN MENINGIOMA EXCISION  26 years old    26 years of age--status post resection of benign brain tumor believed to be a meningioma. Located in the anterior aspect of the left temporal lobe. 10/24/2013--MRI of the brain performed for possible TIA revealed surgical changes of a lateral left temporal parietal craniotomy as well as mid to inferior squamosal left temporal craniotomy consistent with patient's previous surgery. There was a 3.5 x   • COLONOSCOPY  08/20/2015 08/20/2015--entirely normal screening colonoscopy.   • LAPAROSCOPIC HYSTERECTOMY  2010 or so    due to abnormal bleeding due to fibroids, done at [facility], unable to recall physician   • TUBAL ABDOMINAL LIGATION  28 years old    28 years old--tubal ligation.         No Known Allergies        Current Outpatient Medications:   •  aspirin  MG EC tablet, Take 325 mg by mouth Daily., Disp: , Rfl:   •  olmesartan-hydrochlorothiazide (Benicar HCT) 40-25 MG per tablet, Take 1 tablet by mouth Daily for blood pressure., Disp: 15 tablet, Rfl: 0  •  vitamin D3 125 MCG (5000 UT) capsule capsule, Take 5000 units of vitamin D daily as directed, Disp: 30 capsule, Rfl: 11  •  ezetimibe-simvastatin (Vytorin) 10-20 MG per tablet, Take 1 p.o. daily for high cholesterol as directed, Disp: 90 tablet, Rfl: 2  •  LORazepam (Ativan) 1 MG tablet, Take 1 p.o. daily as directed for anxiety/panic, Disp: 30 tablet, Rfl: 1  •  propranolol LA (Inderal LA) 120 MG 24 hr capsule, Take 1 p.o. daily for migraine headache prevention and blood pressure., Disp: 90 capsule, Rfl: 3      Family History   Problem Relation Age of Onset   • Throat cancer Father    • Stroke Father    • Hypertension Other         Benign Essential   • Stroke Brother    • Breast cancer Neg Hx    • Ovarian cancer Neg Hx    • Uterine cancer Neg Hx    • Colon cancer Neg Hx    • Pancreatic cancer Neg Hx          Social History     Socioeconomic History  "  • Marital status: Legally    Tobacco Use   • Smoking status: Never Smoker   • Smokeless tobacco: Never Used   Vaping Use   • Vaping Use: Never used   Substance and Sexual Activity   • Alcohol use: Yes     Comment: Socially   • Drug use: No   • Sexual activity: Yes     Partners: Male     Birth control/protection: Surgical     Comment: hysterectomy          Vitals:    10/06/22 1530   BP: 126/64   Pulse: 71   Resp: 18   SpO2: 98%   Weight: 94.8 kg (209 lb)   Height: 157.5 cm (62\")        Body mass index is 38.23 kg/m².      Physical Exam:    General: Alert and oriented x 3.  No acute distress.  Obese.  Normal affect.  HEENT: Pupils equal, round, reactive to light; extraocular movements intact; sclerae nonicteric; pharynx, ear canals and TMs normal.  Neck: Without JVD, thyromegaly, bruit, or adenopathy.  Lungs: Clear to auscultation in all fields.  Heart: Regular rate and rhythm without murmur, rub, gallop, or click.  Abdomen: Soft, nontender, without hepatosplenomegaly or hernia.  Bowel sounds normal.  : Deferred.  Rectal: Deferred.  Extremities: Without clubbing, cyanosis, edema, or pulse deficit.  Neurologic: Intact without focal deficit.  Normal station and gait observed during ingress and egress from the examination room.  Skin: Without significant lesion.  Musculoskeletal: Unremarkable.    Lab/other results:    CBC is normal.  CPK mildly elevated 195.  CMP normal.  Hemoglobin A1c 6.0.  Total cholesterol is 190, triglycerides elevated 185, LDL particle number elevated 1636, small LDL particle number normal at 433, HDL particle number low at 20.9.  Thyroid function tests are normal.  Vitamin D normal at 52.2.  Urinalysis normal.    Assessment/Plan:     Diagnosis Plan   1. Routine physical examination     2. Impaired fasting glucose  Comprehensive Metabolic Panel   3. Hyperlipidemia  CK    Comprehensive Metabolic Panel    NMR LipoProfile    ezetimibe-simvastatin (Vytorin) 10-20 MG per tablet   4. Benign " essential hypertension  propranolol LA (Inderal LA) 120 MG 24 hr capsule   5. Carotid artery plaque, 12/30/2019--negative MRA neck.  02/01/2018--16-49% bilateral.  10/29/2013--right ICA plaque.  Normal left ICA.  Duplex Carotid Ultrasound CAR   6. Anxiety disorder due to general medical condition  LORazepam (Ativan) 1 MG tablet   7. Hirsutism     8. Hypoactive sexual desire disorder     9. Depression with anxiety  LORazepam (Ativan) 1 MG tablet   10. Vitamin D deficiency  vitamin D3 125 MCG (5000 UT) capsule capsule   11. Chronic migraine w/o aura w/o status migrainosus, not intractable  propranolol LA (Inderal LA) 120 MG 24 hr capsule   12. History of meningioma     13. Non morbid obesity     14. Therapeutic drug monitoring  CK    Comprehensive Metabolic Panel    NMR LipoProfile   15. Need for influenza vaccination     16. History of 2019 novel coronavirus disease (COVID-19)       Patient presents with essentially normal annual physical/preventative visit except for the following issues: She has impaired fasting glucose which is mild and does not require medication.  She has nonmorbid obesity not strongly recommended low carbohydrate diet, exercise, and attainment of ideal body weight.  She is aware she is at risk for development of overt diabetes.  Patient has hyperlipidemia that needs to be treated with medication.  See below.  Her blood pressure has been controlled with Benicar HCT.  She has carotid artery plaque and needs a carotid Doppler study.  Her anxiety and depression seem to be well controlled with Vraylar.  She also takes Xanax and there is no evidence that she is abusing this medication.  Hirsutism and hypoactive sexual desire is being addressed by her gynecologist.  Her vitamin D is in the normal range with supplementation which is important given her postmenopausal status.  Her migraines seem to be stable on propranolol.  She has a history of meningioma without recurrence.    Several preventative  health issues discussed including review of vaccinations and recommendations, including dietary issues, exercise and weight loss.  Safe sex practices discussed.  Patient advised to wear seatbelt whenever driving and avoid texting and driving.  Also advised to look both ways before crossing the street.  Colon cancer prevention discussed and is up-to-date with colonoscopy.  Advised to avoid tobacco products and minimize alcohol consumption.    Plan is as follows: Recommend influenza vaccine.  COVID-vaccine discussed.  Strongly recommend low carbohydrate diet, exercise, and weight loss.  Carotid Doppler study ordered.  Start generic Vytorin 10/20, 1 p.o. daily.    Addendum: Patient reports that the alprazolam is really not lasting long enough to get her through the day.  She would like to have something that she could take once daily and control her anxiety symptoms.  Lorazepam might do the job.  I will change her to lorazepam 1 mg, 1/2-1 p.o. daily.    Addendum #2: Patient reports that the propranolol does help for the migraines but she really would like to have something she could take just 1 a day.  I think we had her on generic Inderal due to insurance/cost constraints.  It appears that Inderal LA will be covered and I will change her to Inderal  mg, 1 p.o. daily.  Discontinue the short acting Inderal.  Patient should contact me for any problems with obtaining this medicine.    Procedures

## 2022-11-10 DIAGNOSIS — F06.4 ANXIETY DISORDER DUE TO GENERAL MEDICAL CONDITION: Chronic | ICD-10-CM

## 2022-11-11 RX ORDER — ALPRAZOLAM 0.5 MG/1
TABLET ORAL
Qty: 60 TABLET | Refills: 0 | Status: SHIPPED | OUTPATIENT
Start: 2022-11-11 | End: 2023-02-24 | Stop reason: SDUPTHER

## 2023-01-31 ENCOUNTER — TELEPHONE (OUTPATIENT)
Dept: INTERNAL MEDICINE | Facility: CLINIC | Age: 58
End: 2023-01-31

## 2023-01-31 NOTE — TELEPHONE ENCOUNTER
Caller: Kelli Ohara    Relationship: Self    Best call back number:    [phone]          Requested Prescriptions: GOUT MEDICATION       Pharmacy where request should be sent:    [facility] - [address] AT [facility] - [phone]  - [phone] FX  [phone]    Additional details provided by patient: PATIENT IS CALLING TO STATE SHE IS HAVING PAIN IN THE ARCH OF LEFT FOOT.  SHE IS WANTING TO KNOW IF DR. OTOOLE WOULD BE WILLING TO PRESCRIBE A MEDICATION.    Does the patient have less than a 3 day supply:  [x] Yes  [] No    Would you like a call back once the refill request has been completed: [x] Yes [] No    If the office needs to give you a call back, can they leave a voicemail: [x] Yes [] No    Josefa Ramirez, Amadoued Rep   01/31/23 08:40 EST     PLEASE ADVISE.

## 2023-02-09 DIAGNOSIS — F41.8 DEPRESSION WITH ANXIETY: Chronic | ICD-10-CM

## 2023-02-09 DIAGNOSIS — F06.4 ANXIETY DISORDER DUE TO GENERAL MEDICAL CONDITION: Chronic | ICD-10-CM

## 2023-02-14 RX ORDER — LORAZEPAM 1 MG/1
TABLET ORAL
Qty: 30 TABLET | Refills: 0 | Status: SHIPPED | OUTPATIENT
Start: 2023-02-14

## 2023-02-24 DIAGNOSIS — F06.4 ANXIETY DISORDER DUE TO GENERAL MEDICAL CONDITION: Chronic | ICD-10-CM

## 2023-03-20 NOTE — TELEPHONE ENCOUNTER
Last ov 10/6/22    Lorazepam 2/14/23 (dr velázquez)    Xanax 11/11/22 #60 no rf     Terrance,     She said she needs to see him anyway and is asking to be worked in, if he approves.    Thanks

## 2023-03-20 NOTE — TELEPHONE ENCOUNTER
"Patient states the 1mg lorazepam was making her feel \"dizzy\" so she started to break those in half and \"felt better.\"    Requesting rf for both medications. Lorazepam was sent by  2/2023; xanax has been on her med list.    Patient is asking if she can be scheduled to see if you will not authorize refill. Also states she likely needs to see you anyway... I have attached  personnel as well in case they are able to work her in asap.  "

## 2023-03-21 RX ORDER — ALPRAZOLAM 0.5 MG/1
.25-.5 TABLET ORAL 2 TIMES DAILY PRN
Qty: 60 TABLET | Refills: 0 | Status: SHIPPED | OUTPATIENT
Start: 2023-03-21

## 2023-04-05 ENCOUNTER — TELEPHONE (OUTPATIENT)
Dept: INTERNAL MEDICINE | Facility: CLINIC | Age: 58
End: 2023-04-05

## 2023-04-05 NOTE — TELEPHONE ENCOUNTER
Provider: YOSELIN OTOOLE    PATIENT WAS SCHEDULED FOR MEDICATION FOLLOW UP ON 4-10-23 WITH ANG KRISHNAN AT HER REQUEST DUE TO LACK OF AVAILIABILTY OF DR. OTOOLE.

## 2023-04-10 ENCOUNTER — OFFICE VISIT (OUTPATIENT)
Dept: INTERNAL MEDICINE | Facility: CLINIC | Age: 58
End: 2023-04-10
Payer: COMMERCIAL

## 2023-04-10 VITALS
WEIGHT: 210 LBS | RESPIRATION RATE: 16 BRPM | TEMPERATURE: 97.2 F | OXYGEN SATURATION: 97 % | DIASTOLIC BLOOD PRESSURE: 80 MMHG | HEIGHT: 62 IN | BODY MASS INDEX: 38.64 KG/M2 | SYSTOLIC BLOOD PRESSURE: 136 MMHG | HEART RATE: 69 BPM

## 2023-04-10 DIAGNOSIS — E78.2 MIXED HYPERLIPIDEMIA: Primary | Chronic | ICD-10-CM

## 2023-04-10 NOTE — PROGRESS NOTES
"Chief Complaint  Follow-up (Labs)    Subjective        Kelli Ohara presents to [facility]  History of Present Illness    Patient is a pleasant 57-year-old female who typically sees Dr. Pike here in the office.  Patient is new to me and presents to the clinic today wanting to do a follow-up examination on laboratory results from December 2022.  History of hyperlipidemia patient previously taking Vytorin 10-20 mg/tab.  Patient has not been taking this medication in quite some time.  Lipid profile to be completed.    Objective   Vital Signs:  /80   Pulse 69   Temp 97.2 °F (36.2 °C)   Resp 16   Ht 157.5 cm (62\")   Wt 95.3 kg (210 lb)   SpO2 97%   BMI 38.41 kg/m²   Estimated body mass index is 38.41 kg/m² as calculated from the following:    Height as of this encounter: 157.5 cm (62\").    Weight as of this encounter: 95.3 kg (210 lb).             Physical Exam  Vitals and nursing note reviewed.   Constitutional:       Appearance: Normal appearance.   HENT:      Head: Normocephalic.      Nose: Nose normal.      Mouth/Throat:      Mouth: Mucous membranes are moist.   Eyes:      Pupils: Pupils are equal, round, and reactive to light.   Cardiovascular:      Rate and Rhythm: Normal rate and regular rhythm.      Pulses: Normal pulses.      Heart sounds: Normal heart sounds.      Comments: No peripheral edema  Pulmonary:      Effort: Pulmonary effort is normal. No respiratory distress.      Breath sounds: Normal breath sounds. No stridor. No wheezing, rhonchi or rales.      Comments: Denies shortness of breath  Chest:      Chest wall: No tenderness.   Musculoskeletal:         General: Normal range of motion.   Skin:     General: Skin is warm.      Capillary Refill: Capillary refill takes less than 2 seconds.   Neurological:      Mental Status: She is alert and oriented to person, place, and time.   Psychiatric:         Behavior: Behavior normal.        Result Review :    Common " labs        9/26/2022    14:48 12/7/2022    15:07   Common Labs   Glucose 98   94     BUN 13   8     Creatinine 0.92   0.96     Sodium 143   142     Potassium 4.0   3.9     Chloride 106   104     Calcium 9.6   9.8     Total Protein 6.9   6.9     Albumin 4.3   4.4     Total Bilirubin 0.3   0.7     Alkaline Phosphatase 104   107     AST (SGOT) 16   18     ALT (SGPT) 18   19     WBC 6.9      Hemoglobin 13.7      Hematocrit 39.8      Platelets 280      Total Cholesterol 190   136     Triglycerides 185   95     Hemoglobin A1C 6.0                     Assessment and Plan   Diagnoses and all orders for this visit:    1. Hyperlipidemia (Primary)  Assessment & Plan:  Chronic/unstable  Was taking Vytorin for this problem.  Patient has not taken this medication for quite some time.  Patient will eat green leafy veggies, increase her water intake, and increase exercise at this time.  Patient will see Dr. Pike and get labs drawn on her next office visit.             Follow Up   Return in about 3 months (around 7/10/2023) for Recheck.  Patient was given instructions and counseling regarding her condition or for health maintenance advice. Please see specific information pulled into the AVS if appropriate.

## 2023-04-10 NOTE — ASSESSMENT & PLAN NOTE
Chronic/unstable  Was taking Vytorin for this problem.  Patient has not taken this medication for quite some time.  Patient will eat green leafy veggies, increase her water intake, and increase exercise at this time.  Patient will see Dr. Pike and get labs drawn on her next office visit.

## 2023-04-14 DIAGNOSIS — E78.2 MIXED HYPERLIPIDEMIA: Chronic | ICD-10-CM

## 2023-04-14 DIAGNOSIS — I10 BENIGN ESSENTIAL HYPERTENSION: Chronic | ICD-10-CM

## 2023-04-14 DIAGNOSIS — G43.709 CHRONIC MIGRAINE W/O AURA W/O STATUS MIGRAINOSUS, NOT INTRACTABLE: Chronic | ICD-10-CM

## 2023-04-14 RX ORDER — EZETIMIBE AND SIMVASTATIN 10; 20 MG/1; MG/1
TABLET ORAL
Qty: 90 TABLET | Refills: 2 | Status: SHIPPED | OUTPATIENT
Start: 2023-04-14

## 2023-04-14 RX ORDER — OLMESARTAN MEDOXOMIL AND HYDROCHLOROTHIAZIDE 40/25 40; 25 MG/1; MG/1
1 TABLET ORAL DAILY
Qty: 15 TABLET | Refills: 0 | Status: SHIPPED | OUTPATIENT
Start: 2023-04-14

## 2023-04-14 RX ORDER — PROPRANOLOL HYDROCHLORIDE 120 MG/1
CAPSULE, EXTENDED RELEASE ORAL
Qty: 90 CAPSULE | Refills: 3 | Status: SHIPPED | OUTPATIENT
Start: 2023-04-14

## 2023-04-14 NOTE — TELEPHONE ENCOUNTER
Caller: Kelli Ohara    Relationship: Self    Requested Prescriptions:   Requested Prescriptions     Pending Prescriptions Disp Refills   • propranolol LA (Inderal LA) 120 MG 24 hr capsule 90 capsule 3     Sig: Take 1 p.o. daily for migraine headache prevention and blood pressure.   • olmesartan-hydrochlorothiazide (Benicar HCT) 40-25 MG per tablet 15 tablet 0     Sig: Take 1 tablet by mouth Daily for blood pressure.   • ezetimibe-simvastatin (Vytorin) 10-20 MG per tablet 90 tablet 2     Sig: Take 1 p.o. daily for high cholesterol as directed        Pharmacy where request should be sent: [address] AT [place] [phone] [place], [place] [phone] FX     Last office visit with prescribing clinician: 10/6/2022   Last telemedicine visit with prescribing clinician: 7/24/2023   Next office visit with prescribing clinician: 7/24/2023     Additional details provided by patient: PATIENT HAS ONE DAY LEFT.     Does the patient have less than a 3 day supply:  [x] Yes  [] No    Would you like a call back once the refill request has been completed: [] Yes [x] No    If the office needs to give you a call back, can they leave a voicemail: [] Yes [x] No    Rosenda Forrester Rep   04/14/23 11:09 EDT

## 2023-05-04 DIAGNOSIS — I10 BENIGN ESSENTIAL HYPERTENSION: Chronic | ICD-10-CM

## 2023-05-04 RX ORDER — OLMESARTAN MEDOXOMIL AND HYDROCHLOROTHIAZIDE 40/25 40; 25 MG/1; MG/1
1 TABLET ORAL DAILY
Qty: 30 TABLET | Refills: 0 | Status: SHIPPED | OUTPATIENT
Start: 2023-05-04

## 2023-05-17 ENCOUNTER — HOSPITAL ENCOUNTER (EMERGENCY)
Facility: HOSPITAL | Age: 58
Discharge: HOME | End: 2023-05-17
Attending: EMERGENCY MEDICINE
Payer: COMMERCIAL

## 2023-05-17 ENCOUNTER — OFFICE VISIT (OUTPATIENT)
Dept: INTERNAL MEDICINE | Facility: CLINIC | Age: 58
End: 2023-05-17
Payer: COMMERCIAL

## 2023-05-17 VITALS
DIASTOLIC BLOOD PRESSURE: 52 MMHG | HEIGHT: 62 IN | HEART RATE: 73 BPM | OXYGEN SATURATION: 93 % | WEIGHT: 214 LBS | TEMPERATURE: 98.3 F | BODY MASS INDEX: 39.38 KG/M2 | RESPIRATION RATE: 20 BRPM | SYSTOLIC BLOOD PRESSURE: 84 MMHG

## 2023-05-17 VITALS
BODY MASS INDEX: 39.38 KG/M2 | WEIGHT: 214 LBS | TEMPERATURE: 96.9 F | HEART RATE: 75 BPM | RESPIRATION RATE: 16 BRPM | HEIGHT: 62 IN | OXYGEN SATURATION: 96 % | DIASTOLIC BLOOD PRESSURE: 85 MMHG | SYSTOLIC BLOOD PRESSURE: 118 MMHG

## 2023-05-17 DIAGNOSIS — Z86.69 HISTORY OF MIGRAINE HEADACHES: ICD-10-CM

## 2023-05-17 DIAGNOSIS — I95.9 HYPOTENSIVE EPISODE: Primary | ICD-10-CM

## 2023-05-17 DIAGNOSIS — G43.911 INTRACTABLE MIGRAINE WITH STATUS MIGRAINOSUS, UNSPECIFIED MIGRAINE TYPE: ICD-10-CM

## 2023-05-17 DIAGNOSIS — H53.8 BLURRY VISION, BILATERAL: ICD-10-CM

## 2023-05-17 DIAGNOSIS — G43.909 MIGRAINE SYNDROME: Primary | ICD-10-CM

## 2023-05-17 DIAGNOSIS — R42 DIZZINESS: ICD-10-CM

## 2023-05-17 PROCEDURE — 25010000002 KETOROLAC TROMETHAMINE PER 15 MG: Performed by: EMERGENCY MEDICINE

## 2023-05-17 PROCEDURE — 96372 THER/PROPH/DIAG INJ SC/IM: CPT

## 2023-05-17 PROCEDURE — 99283 EMERGENCY DEPT VISIT LOW MDM: CPT

## 2023-05-17 PROCEDURE — 25010000002 PROCHLORPERAZINE 10 MG/2ML SOLUTION: Performed by: EMERGENCY MEDICINE

## 2023-05-17 PROCEDURE — 63710000001 DIPHENHYDRAMINE PER 50 MG: Performed by: EMERGENCY MEDICINE

## 2023-05-17 RX ORDER — KETOROLAC TROMETHAMINE 30 MG/ML
30 INJECTION, SOLUTION INTRAMUSCULAR; INTRAVENOUS ONCE
Status: COMPLETED | OUTPATIENT
Start: 2023-05-17 | End: 2023-05-17

## 2023-05-17 RX ORDER — PROCHLORPERAZINE EDISYLATE 5 MG/ML
10 INJECTION INTRAMUSCULAR; INTRAVENOUS ONCE
Status: COMPLETED | OUTPATIENT
Start: 2023-05-17 | End: 2023-05-17

## 2023-05-17 RX ORDER — DIPHENHYDRAMINE HCL 25 MG
50 CAPSULE ORAL ONCE
Status: COMPLETED | OUTPATIENT
Start: 2023-05-17 | End: 2023-05-17

## 2023-05-17 RX ADMIN — DIPHENHYDRAMINE HYDROCHLORIDE 50 MG: 25 CAPSULE ORAL at 15:23

## 2023-05-17 RX ADMIN — PROCHLORPERAZINE EDISYLATE 10 MG: 5 INJECTION INTRAMUSCULAR; INTRAVENOUS at 15:24

## 2023-05-17 RX ADMIN — KETOROLAC TROMETHAMINE 30 MG: 30 INJECTION, SOLUTION INTRAMUSCULAR; INTRAVENOUS at 15:24

## 2023-05-17 NOTE — ED PROVIDER NOTES
EMERGENCY DEPARTMENT ENCOUNTER    Room Number:  18/18  Date of encounter:  5/17/2023  PCP: Víctor Pike MD  Historian: Patient      HPI:  Chief Complaint: Migraine  A complete HPI/ROS/PMH/PSH/SH/FH are unobtainable due to: None    Context: Kelli Ohara is a 57 y.o. female who presents to the ED via private vehicle for evaluation for migraine headache since Friday.  Was seen at [facility] earlier this morning and had a head CT done which was reportedly unremarkable, cannot review that due to [facility]'s current EMR issues.  Received a DuoNeb there for some mild shortness of breath.  Says that she received 2 tablets for pain but I do not know what those were.  Was then seen by her primary care provider later this morning who noted a isolated blood pressure reading of 80/60 and sent her to the ER.  Blood pressure here is currently back to normal.  Headache is ongoing, has a history of migraines.  No dizziness, visual changes, numbness or weakness of the arms or legs.  Has had nausea but no vomiting or diarrhea.  No gait abnormalities, no speech abnormalities.  Her only current desire right now is to receive medications for headache and to go home.  She does not want any further testing.      MEDICAL RECORD REVIEW    External (non-ED) record review: Office visit note reviewed from earlier today with ANGELITO Barajas, was sent to the ER due to L evaluation of the low blood pressure event.    PAST MEDICAL HISTORY  Active Ambulatory Problems     Diagnosis Date Noted   • Anxiety disorder due to general medical condition 04/14/2016   • Benign essential hypertension 04/12/2006   • Carotid artery plaque, 12/30/2019--negative MRA neck.  02/01/2018--16-49% bilateral.  10/29/2013--right ICA plaque.  Normal left ICA. 10/29/2013   • Hirsutism 04/14/2016   • Hyperlipidemia 02/07/2012   • Chronic migraine w/o aura w/o status migrainosus, not intractable 10/22/2013   • Vitamin D deficiency 04/14/2016   • Therapeutic drug  monitoring 04/25/2016   • Routine physical examination 04/25/2016   • History of meningioma 04/26/2016   • Depression with anxiety 04/26/2016   • Non morbid obesity 02/25/2019   • Impaired fasting glucose 04/29/2019   • Hypoactive sexual desire disorder 06/13/2019   • History of 2019 novel coronavirus disease (COVID-19) 10/06/2022     Resolved Ambulatory Problems     Diagnosis Date Noted   • History of transient cerebral ischemia 04/14/2016   • History of Doppler ultrasound of Artery: Carotid 04/14/2016   • History of echocardiogram 04/14/2016   • History of mammogram 04/14/2016   • History of Iron deficiency anemia due to chronic blood loss 04/14/2016   • History of Seizure disorder, grand mal 04/14/2016   • Complicated migraine 10/22/2013   • Family history of throat cancer 11/21/2016   • Noncompliance 04/29/2019   • Benign paroxysmal positional vertigo due to bilateral vestibular disorder 07/09/2019   • New daily persistent headache 07/09/2019   • Dizziness 07/09/2019   • Bilious vomiting with nausea 07/09/2019   • Dysarthria 12/09/2019   • Acute left-sided weakness 12/09/2019   • Bicipital tendinitis of left shoulder 12/09/2019   • Memory loss 06/15/2021   • Acute on chronic alteration in mental status 06/15/2021     Past Medical History:   Diagnosis Date   • History of iron deficiency anemia 07/21/2015   • Hyperlipidemia 2/7/2012   • Migraine headaches 10/22/2013         PAST SURGICAL HISTORY  Past Surgical History:   Procedure Laterality Date   • BRAIN MENINGIOMA EXCISION  26 years old    26 years of age--status post resection of benign brain tumor believed to be a meningioma. Located in the anterior aspect of the left temporal lobe. 10/24/2013--MRI of the brain performed for possible TIA revealed surgical changes of a lateral left temporal parietal craniotomy as well as mid to inferior squamosal left temporal craniotomy consistent with patient's previous surgery. There was a 3.5 x   • COLONOSCOPY  08/20/2015     08/20/2015--entirely normal screening colonoscopy.   • LAPAROSCOPIC HYSTERECTOMY  2010 or so    due to abnormal bleeding due to fibroids, done at [facility], unable to recall physician   • TUBAL ABDOMINAL LIGATION  28 years old    28 years old--tubal ligation.         FAMILY HISTORY  Family History   Problem Relation Age of Onset   • Throat cancer Father    • Stroke Father    • Hypertension Other         Benign Essential   • Stroke Brother    • Breast cancer Neg Hx    • Ovarian cancer Neg Hx    • Uterine cancer Neg Hx    • Colon cancer Neg Hx    • Pancreatic cancer Neg Hx          SOCIAL HISTORY  Social History     Socioeconomic History   • Marital status: Legally    Tobacco Use   • Smoking status: Never   • Smokeless tobacco: Never   Vaping Use   • Vaping Use: Never used   Substance and Sexual Activity   • Alcohol use: Not Currently     Comment: Socially   • Drug use: No   • Sexual activity: Yes     Partners: Male     Birth control/protection: Surgical     Comment: hysterectomy          ALLERGIES  Patient has no known allergies.        REVIEW OF SYSTEMS  Review of Systems     All systems reviewed and negative except for those discussed in HPI.       PHYSICAL EXAM    I have reviewed the triage vital signs and nursing notes.    ED Triage Vitals   Temp Heart Rate Resp BP SpO2   05/17/23 1226 05/17/23 1224 05/17/23 1224 05/17/23 1226 05/17/23 1224   96.9 °F (36.1 °C) 82 18 121/80 97 %      Temp src Heart Rate Source Patient Position BP Location FiO2 (%)   05/17/23 1224 05/17/23 1224 05/17/23 1226 05/17/23 1226 --   Oral Monitor Sitting Right arm        Physical Exam  General: No acute distress, nontoxic  HEENT: Mucous membranes moist, atraumatic, EOMI  Neck: Full ROM  Pulm: Symmetric chest rise, nonlabored, lungs CTAB  Cardiovascular: Regular rate and rhythm, intact distal pulses  GI: Soft, nontender, nondistended, no rebound, no guarding, bowel sounds present  MSK: Full ROM, no deformity  Skin: Warm,  dry  Neuro: Awake, alert, oriented x 4, GCS 15, no facial droop, no dysarthria aphasia, 5 out of strength sensation bilateral upper and lower extremities, ambulatory without gait abnormality.  Moving all extremities, no focal deficits  Psych: Calm, cooperative        LAB RESULTS  No results found for this or any previous visit (from the past 24 hour(s)).    Ordered the above labs and independently interpreted results. My findings will be discussed in the medical decision making section below        RADIOLOGY  No Radiology Exams Resulted Within Past 24 Hours    Ordered the above noted radiological studies.  Independently interpreted by me and my independent review of findings can be found in the ED Course.  See dictation for official radiology interpretation.      PROCEDURES    Procedures      MEDICATIONS GIVEN IN ER    Medications   prochlorperazine (COMPAZINE) injection 10 mg (10 mg Intramuscular Given 5/17/23 1524)   ketorolac (TORADOL) injection 30 mg (30 mg Intramuscular Given 5/17/23 1524)   diphenhydrAMINE (BENADRYL) capsule 50 mg (50 mg Oral Given 5/17/23 1523)         PROGRESS, DATA ANALYSIS, CONSULTS, AND MEDICAL DECISION MAKING    Please note that this section constitutes my independent interpretation of clinical data including lab results, radiology, EKG's.  This constitutes my independent professional opinion regarding differential diagnosis and management of this patient.  It may include any factors such as history from outside sources, review of external records, social determinants of health, management of medications, response to those treatments, and discussions with other providers.    Differential Diagnosis and Plan: Initial concern for migraine syndrome, tension headache, hypertensive event, hypotensive event, intracranial hemorrhage, intracranial mass, among others.  Patient received a CT scan earlier today with Nirav, was discharged afterwards, unable to review those results.  She does not  demonstrate any focal neurodeficits at this time.  History and presentation most consistent with migraine syndrome.  She has no vital sign abnormalities.  She declines any further diagnostic work-up.  Will plan for symptomatic control and discharge with outpatient follow-up.    Additional sources:  - Discussed/ obtained information from independent historians:       - Chronic or social conditions impacting care:      - Shared decision making:  Patient fully updated on and in agreement with the course and plan moving forward    ED Course as of 05/17/23 1844   Wed May 17, 2023   1609 Patient does report some mild improvement already after medications, she is comfortable with the plan for discharge at this time.  PCP follow-up for recheck within 1 week, ED return for worsening symptoms as needed.  Presentation most consistent with migraine syndrome [DC]      ED Course User Index  [DC] Niko Bell MD       Hospitalization Considered?:     Orders Placed During This Visit:  No orders of the defined types were placed in this encounter.      Additional orders considered but not placed:      Independent interpretation of labs, radiology studies, and discussions with consultants: See ED Course        AS OF 18:44 EDT VITALS:    BP - 118/85  HR - 75  TEMP - 96.9 °F (36.1 °C) (Tympanic)  02 SATS - 96%        DIAGNOSIS  Final diagnoses:   Migraine syndrome   History of migraine headaches         DISPOSITION  DISCHARGE    Patient discharged in stable condition.    Reviewed implications of results, diagnosis, meds, responsibility to follow up, warning signs and symptoms of possible worsening, potential complications and reasons to return to ER. If your blood pressure > 120/80 please follow up with your primary care provider for further management.    Patient/Family voiced understanding of above instructions.    Discussed plan for discharge, as there is no emergent indication for admission. Pt/family is agreeable and understands  need for follow up and repeat testing.  Pt is aware that discharge does not mean that nothing is wrong but it indicates no emergency is present that requires admission and they must continue care with follow-up as given below or physician of their choice.     FOLLOW-UP  [facility]  [address]  [facility] [address] [phone]    As needed, If symptoms worsen    Víctor Pike MD  [address] [facility]  [address]  [facility] [id number] [phone]    Schedule an appointment as soon as possible for a visit   for recheck within 1 week as needed         Medication List      No changes were made to your prescriptions during this visit.                       --    Please note that portions of this were completed with a voice recognition program.       Note Disclaimer: At [facility], we believe that sharing information builds trust and better relationships. You are receiving this note because you are receiving care at [facility] or recently visited. It is possible you will see health information before a provider has talked with you about it. This kind of information can be easy to misunderstand. To help you fully understand what it means for your health, we urge you to discuss this note with your provider.         Niko Bell MD  05/17/23 [id number]

## 2023-05-17 NOTE — Clinical Note
[facility] EMERGENCY DEPARTMENT  [address] [facility] [address]  Phone: [phone]    Dutch accompanied Kelli Ohara to the emergency department on 5/17/2023. They may return to work on 05/18/2023.        Thank you for choosing [facility].    Niko Bell MD

## 2023-05-17 NOTE — Clinical Note
[facility] EMERGENCY DEPARTMENT  [address] [facility] [address]  Phone: [phone]    Kelli Ohara was seen and treated in our emergency department on 5/17/2023.  She may return to work on 05/19/2023.         Thank you for choosing [facility].    Niko Bell MD

## 2023-05-17 NOTE — ED NOTES
Pt presents to ED with complaints of a headache since Friday-pt states it is different than her usual migraines.

## 2023-05-17 NOTE — DISCHARGE INSTRUCTIONS
Continue current medications, close outpatient PCP follow-up, ED return for worsening symptoms as needed.

## 2023-05-17 NOTE — PROGRESS NOTES
"Chief Complaint  Hospital Follow Up Visit (Today/), Migraine, Wheezing, right shoulder pain, and right knee    Subjective        Kelli Ohara presents to [facility]  History of Present Illness    Patient is a pleasant 57-year-old female who typically sees Dr. Pike here in the office.  Patient is here following a hospital/emergency room visit this morning.  Patient states she has had a migraine since last Friday and her symptoms are worsening.  Patient states she works at [employer] and they were unable to get a vein for blood draw and/or fluids.  We are unable to see [facility] notes at this time.  Patient denies any chest pain or shortness of breath at this time.  Patient does state that she has blurry vision bilaterally.  History of 2 TIA/stroke events x7 to 8 years ago.  Not on any blood thinners at this time.  Denies any one-sided weakness or confusion at this time.    Objective   Vital Signs:  BP (!) 84/52 (BP Location: Right arm, Patient Position: Sitting, Cuff Size: Adult) Comment: 84/50  Pulse 73   Temp 98.3 °F (36.8 °C)   Resp 20   Ht 157 cm (61.81\")   Wt 97.1 kg (214 lb)   SpO2 93%   BMI 39.38 kg/m²   Estimated body mass index is 39.38 kg/m² as calculated from the following:    Height as of this encounter: 157 cm (61.81\").    Weight as of this encounter: 97.1 kg (214 lb).             Physical Exam  Vitals and nursing note reviewed.   Constitutional:       Appearance: Normal appearance. She is obese. She is ill-appearing.   HENT:      Head: Normocephalic.      Nose: Nose normal.      Mouth/Throat:      Mouth: Mucous membranes are moist.   Eyes:      Pupils: Pupils are equal, round, and reactive to light.      Comments: Bilateral red eyes   Cardiovascular:      Rate and Rhythm: Normal rate and regular rhythm.      Pulses: Normal pulses.      Heart sounds: Normal heart sounds.      Comments: No peripheral edema  Pulmonary:      Effort: Pulmonary " effort is normal. No respiratory distress.      Breath sounds: Normal breath sounds. No stridor. No wheezing, rhonchi or rales.      Comments: Denies shortness of breath  Chest:      Chest wall: No tenderness.   Musculoskeletal:         General: Normal range of motion.   Skin:     General: Skin is warm.      Capillary Refill: Capillary refill takes less than 2 seconds.   Neurological:      Mental Status: She is alert and oriented to person, place, and time.   Psychiatric:         Behavior: Behavior normal.        Result Review :    Common labs        9/26/2022    14:48 12/7/2022    15:07   Common Labs   Glucose 98   94     BUN 13   8     Creatinine 0.92   0.96     Sodium 143   142     Potassium 4.0   3.9     Chloride 106   104     Calcium 9.6   9.8     Total Protein 6.9   6.9     Albumin 4.3   4.4     Total Bilirubin 0.3   0.7     Alkaline Phosphatase 104   107     AST (SGOT) 16   18     ALT (SGPT) 18   19     WBC 6.9      Hemoglobin 13.7      Hematocrit 39.8      Platelets 280      Total Cholesterol 190   136     Triglycerides 185   95     Hemoglobin A1C 6.0          Office Visit with Raya Chapa APRN (04/10/2023)             Assessment and Plan   Diagnoses and all orders for this visit:    1. Hypotensive episode (Primary)    2. Dizziness    3. Intractable migraine with status migrainosus, unspecified migraine type    4. Blurry vision, bilateral      Patient's caregiver is taking her back to the hospital/ER at this time for further evaluation and treatment.         Follow Up   Return if symptoms worsen or fail to improve.  Patient was given instructions and counseling regarding her condition or for health maintenance advice. Please see specific information pulled into the AVS if appropriate.

## 2023-05-17 NOTE — LETTER
May 17, 2023     Patient: Kelli Ohara   YOB: 1965   Date of Visit: 5/17/2023       To Whom It May Concern:    It is my medical opinion that Kelli Ohara may return to work Monday 05/23/2023.         Sincerely,        ANGELITO Mace    CC: No Recipients

## 2023-06-05 DIAGNOSIS — I10 BENIGN ESSENTIAL HYPERTENSION: Chronic | ICD-10-CM

## 2023-06-05 DIAGNOSIS — F06.4 ANXIETY DISORDER DUE TO GENERAL MEDICAL CONDITION: Chronic | ICD-10-CM

## 2023-06-06 RX ORDER — ALPRAZOLAM 0.5 MG/1
.25-.5 TABLET ORAL 2 TIMES DAILY PRN
Qty: 60 TABLET | Refills: 0 | Status: SHIPPED | OUTPATIENT
Start: 2023-06-06

## 2023-06-06 RX ORDER — OLMESARTAN MEDOXOMIL AND HYDROCHLOROTHIAZIDE 40/25 40; 25 MG/1; MG/1
1 TABLET ORAL DAILY
Qty: 30 TABLET | Refills: 1 | Status: SHIPPED | OUTPATIENT
Start: 2023-06-06

## 2023-06-06 NOTE — TELEPHONE ENCOUNTER
Last ov with Raya 5/17/23.  Last refill was 3/21/23 per CARLO.  She's also on Ativan??  Last RF was 2/15/23

## 2023-08-14 DIAGNOSIS — I10 BENIGN ESSENTIAL HYPERTENSION: Chronic | ICD-10-CM

## 2023-08-14 RX ORDER — OLMESARTAN MEDOXOMIL AND HYDROCHLOROTHIAZIDE 40/25 40; 25 MG/1; MG/1
1 TABLET ORAL DAILY
Qty: 30 TABLET | Refills: 4 | Status: SHIPPED | OUTPATIENT
Start: 2023-08-14

## 2023-09-27 DIAGNOSIS — F06.4 ANXIETY DISORDER DUE TO GENERAL MEDICAL CONDITION: Chronic | ICD-10-CM

## 2023-09-27 RX ORDER — ALPRAZOLAM 0.5 MG/1
TABLET ORAL
Qty: 60 TABLET | Refills: 0 | OUTPATIENT
Start: 2023-09-27

## 2023-09-28 ENCOUNTER — TELEPHONE (OUTPATIENT)
Dept: INTERNAL MEDICINE | Facility: CLINIC | Age: 58
End: 2023-09-28
Payer: COMMERCIAL

## 2023-09-28 DIAGNOSIS — G43.709 CHRONIC MIGRAINE W/O AURA W/O STATUS MIGRAINOSUS, NOT INTRACTABLE: Chronic | ICD-10-CM

## 2023-09-28 DIAGNOSIS — F06.4 ANXIETY DISORDER DUE TO GENERAL MEDICAL CONDITION: Chronic | ICD-10-CM

## 2023-09-28 DIAGNOSIS — E78.2 MIXED HYPERLIPIDEMIA: Chronic | ICD-10-CM

## 2023-09-28 DIAGNOSIS — I10 BENIGN ESSENTIAL HYPERTENSION: Chronic | ICD-10-CM

## 2023-09-28 NOTE — TELEPHONE ENCOUNTER
She cancelled 2 appts in July and no showed appt on 9/18/23.  Per BHAKTI report she has not picked up a new Xanax script since March 21, 2023.  Please advise

## 2023-09-28 NOTE — TELEPHONE ENCOUNTER
Caller: Kelli Ohara    Relationship: Self    Best call back number: [phone]     Requested Prescriptions:      propranolol LA (Inderal LA) 120 MG 24 hr capsule         olmesartan-hydrochlorothiazide (BENICAR HCT) 40-25 MG per tablet        ALPRAZolam (XANAX) 0.5 MG tablet       Pharmacy where request should be sent:    [address] AT [facility] - [address] [place] [address] - [address] [place] [address]   Last office visit with prescribing clinician: 10/6/2022   Last telemedicine visit with prescribing clinician: Visit date not found   Next office visit with prescribing clinician: 10/23/2023     Additional details provided by patient: PATIENT IS CALLING TO REQUEST AN EMERGENCY SUPPLY OF THE ABOVE MEDICATIONS TO LAST UNTIL HER APPOINTMENT ON 10/23/23.  SHE STATES SHE IS COMPLETELY OUT OF THE XANAX, AND WILL RUN OUT OF THE OTHER 2 BEFORE THE APPOINTMENT.    Does the patient have less than a 3 day supply:  [x] Yes  [] No    Would you like a call back once the refill request has been completed: [x] Yes [] No    If the office needs to give you a call back, can they leave a voicemail: [x] Yes [] No    Rosenda De Leon Rep   09/28/23 10:01 EDT     PLEASE ADVISE.

## 2023-10-02 RX ORDER — OLMESARTAN MEDOXOMIL AND HYDROCHLOROTHIAZIDE 40/25 40; 25 MG/1; MG/1
1 TABLET ORAL DAILY
Qty: 30 TABLET | Refills: 1 | Status: SHIPPED | OUTPATIENT
Start: 2023-10-02

## 2023-10-02 RX ORDER — PROPRANOLOL HYDROCHLORIDE 120 MG/1
CAPSULE, EXTENDED RELEASE ORAL
Qty: 30 CAPSULE | Refills: 1 | Status: SHIPPED | OUTPATIENT
Start: 2023-10-02

## 2023-10-02 RX ORDER — ALPRAZOLAM 0.5 MG/1
.25-.5 TABLET ORAL 2 TIMES DAILY PRN
Qty: 60 TABLET | Refills: 1 | Status: SHIPPED | OUTPATIENT
Start: 2023-10-02

## 2023-10-02 RX ORDER — EZETIMIBE AND SIMVASTATIN 10; 20 MG/1; MG/1
TABLET ORAL
Qty: 30 TABLET | Refills: 1 | Status: SHIPPED | OUTPATIENT
Start: 2023-10-02

## 2023-10-20 ENCOUNTER — TELEPHONE (OUTPATIENT)
Dept: INTERNAL MEDICINE | Facility: CLINIC | Age: 58
End: 2023-10-20

## 2023-10-20 NOTE — TELEPHONE ENCOUNTER
Caller: Kelli Ohara    Relationship to patient: Self    Best call back number:   [phone]    Chief complaint: ANGELY HAS FOLLOW UP SCHEDULED FOR 3PM 10-23-23, SHE DOES NOT GET OFF UNTIL 3PM BUT SHE CAN GET TO OFFICE ABOUT 3:15 SHE THINKS.    SHE REQUEST TO RESCHEDULE AT A TIME WHERE SHE IS NOT AT RISK FOR BEING LATE AND HAVING APPOINTMENT CANCELLED.    PLEASE CALL TO DISCUSS, THANK YOU    Type of visit:FOLLOW UP    If rescheduling, when is the original appointment: 10-23-23

## 2023-10-23 ENCOUNTER — OFFICE VISIT (OUTPATIENT)
Dept: INTERNAL MEDICINE | Facility: CLINIC | Age: 58
End: 2023-10-23
Payer: COMMERCIAL

## 2023-10-23 VITALS
DIASTOLIC BLOOD PRESSURE: 66 MMHG | RESPIRATION RATE: 14 BRPM | HEART RATE: 73 BPM | OXYGEN SATURATION: 99 % | WEIGHT: 213.8 LBS | BODY MASS INDEX: 39.34 KG/M2 | HEIGHT: 62 IN | SYSTOLIC BLOOD PRESSURE: 108 MMHG

## 2023-10-23 DIAGNOSIS — Z12.31 ENCOUNTER FOR SCREENING MAMMOGRAM FOR MALIGNANT NEOPLASM OF BREAST: ICD-10-CM

## 2023-10-23 DIAGNOSIS — G43.709 CHRONIC MIGRAINE W/O AURA W/O STATUS MIGRAINOSUS, NOT INTRACTABLE: Chronic | ICD-10-CM

## 2023-10-23 DIAGNOSIS — F06.4 ANXIETY DISORDER DUE TO GENERAL MEDICAL CONDITION: Chronic | ICD-10-CM

## 2023-10-23 DIAGNOSIS — I65.21 ATHEROSCLEROSIS OF RIGHT CAROTID ARTERY: Chronic | ICD-10-CM

## 2023-10-23 DIAGNOSIS — Z86.16 HISTORY OF 2019 NOVEL CORONAVIRUS DISEASE (COVID-19): Chronic | ICD-10-CM

## 2023-10-23 DIAGNOSIS — E78.2 MIXED HYPERLIPIDEMIA: Chronic | ICD-10-CM

## 2023-10-23 DIAGNOSIS — Z51.81 THERAPEUTIC DRUG MONITORING: ICD-10-CM

## 2023-10-23 DIAGNOSIS — E66.9 NON MORBID OBESITY: Chronic | ICD-10-CM

## 2023-10-23 DIAGNOSIS — Z00.00 ROUTINE PHYSICAL EXAMINATION: Primary | ICD-10-CM

## 2023-10-23 DIAGNOSIS — E55.9 VITAMIN D DEFICIENCY: Chronic | ICD-10-CM

## 2023-10-23 DIAGNOSIS — R73.01 IMPAIRED FASTING GLUCOSE: Chronic | ICD-10-CM

## 2023-10-23 DIAGNOSIS — I10 BENIGN ESSENTIAL HYPERTENSION: Chronic | ICD-10-CM

## 2023-10-23 DIAGNOSIS — F41.8 DEPRESSION WITH ANXIETY: Chronic | ICD-10-CM

## 2023-10-23 RX ORDER — EZETIMIBE AND SIMVASTATIN 10; 20 MG/1; MG/1
TABLET ORAL
Qty: 30 TABLET | Refills: 5 | Status: SHIPPED | OUTPATIENT
Start: 2023-10-23

## 2023-10-23 RX ORDER — OLMESARTAN MEDOXOMIL AND HYDROCHLOROTHIAZIDE 40/25 40; 25 MG/1; MG/1
1 TABLET ORAL DAILY
Qty: 30 TABLET | Refills: 5 | Status: SHIPPED | OUTPATIENT
Start: 2023-10-23

## 2023-10-23 RX ORDER — ALPRAZOLAM 0.5 MG/1
.25-.5 TABLET ORAL 2 TIMES DAILY PRN
Qty: 60 TABLET | Refills: 2 | Status: SHIPPED | OUTPATIENT
Start: 2023-10-23

## 2023-10-23 RX ORDER — PROPRANOLOL HYDROCHLORIDE 120 MG/1
CAPSULE, EXTENDED RELEASE ORAL
Qty: 30 CAPSULE | Refills: 5 | Status: SHIPPED | OUTPATIENT
Start: 2023-10-23

## 2023-10-23 NOTE — PROGRESS NOTES
10/23/2023    Patient Information  Kelli Ohara                                                                                          [address] [facility] [address]      1965  [unfilled]  [phone] (work)    Chief Complaint:     Routine annual physical examination.  No new acute complaints.    History of Present Illness:    Patient with medical problems as outlined below in assessment and plan presents today for routine annual physical/preventative visit.  Unfortunately, patient did not have lab work prior to this visit and she has been well aware in the past that it is important to have this done prior to the visit so that we will have more to discuss and make sure that she has been doing okay.  Her past medical history reviewed and updated were necessary including health maintenance parameters.  This reveals she needs a carotid Doppler study and a mammogram.  It appears she is up-to-date on her colon cancer screening.    Review of Systems   Constitutional: Negative.   HENT: Negative.     Eyes: Negative.    Cardiovascular: Negative.    Respiratory: Negative.     Endocrine: Negative.    Hematologic/Lymphatic: Negative.    Skin: Negative.    Musculoskeletal: Negative.    Gastrointestinal: Negative.    Genitourinary: Negative.    Neurological: Negative.    Psychiatric/Behavioral: Negative.     Allergic/Immunologic: Negative.        Active Problems:    Patient Active Problem List   Diagnosis    Anxiety disorder due to general medical condition    Benign essential hypertension    Carotid artery plaque, 12/30/2019--negative MRA neck.  02/01/2018--16-49% bilateral.  10/29/2013--right ICA plaque.  Normal left ICA.    Hirsutism    Hyperlipidemia    Chronic migraine w/o aura w/o status migrainosus, not intractable    Vitamin D deficiency    Therapeutic drug monitoring    Routine physical examination    History of meningioma    Depression with anxiety    Non morbid obesity    Impaired  "fasting glucose    Hypoactive sexual desire disorder    History of 2019 novel coronavirus disease (COVID-19)         Past Medical History:   Diagnosis Date    Anxiety disorder due to general medical condition 4/14/2016    Benign essential hypertension 4/12/2006 04/12/2006--treatment for hypertension begun.    Carotid artery plaque, 12/30/2019--negative MRA neck.  02/01/2018--16-49% bilateral.  10/29/2013--right ICA plaque.  Normal left ICA. 10/29/2013    December 30, 2019--MRA of the head and neck negative.  February 1, 2018--carotid Doppler study revealed 16-49% bilateral carotid artery plaque.  10/29/2013--carotid Doppler revealed plaque without stenosis in the right internal carotid artery. There was no evidence of hemodynamically significant stenosis in the left carotid system. Antegrade flow was present in the left vertebral artery.    Chronic migraine w/o aura w/o status migrainosus, not intractable 10/22/2013    December 9, 2019--patient presents with concerns that she may be having \"more TIAs.\".  She is describing numbness in her left foot but no weakness or paresthesias.  No loss of coordination.  She reports that her left upper extremity is \"killing her\" and what she is describing is shooting pain in the left biceps region.  No numbness or paresthesias in the left upper extremity.  She is had no visual    Depression with anxiety 4/26/2016    Hirsutism 4/14/2016    History of 2019 novel coronavirus disease (COVID-19) 10/6/2022    August 2022--patient tested positive for COVID.  Symptoms were very mild and resolved without residual.    History of iron deficiency anemia 07/21/2015 07/21/2015--patient was admitted briefly to the hospital with a hemoglobin of 7.3. This was believed to be due to an episode of rectal bleeding, but primarily due to menorrhagia.    History of meningioma 08/19/2014 08/19/2014--MRI again performed for left sided facial and arm numbness and tingling.  This reveals status post " resection of a meningioma via a left temporoparietal craniotomy.  There is no evidence to suggest residual or recurrent tumor.  Again noted is a focus of encephalomalacia within the anterior portion of the left temporal lobe deep to the craniotomy flap.  10/24/2013--MRI of the brain perfo    History of Seizure disorder, grand mal 4/14/2016    This was related to a benign brain tumor of the left temporal lobe that is believed to be a meningioma. Status post resection and no recurrence of the seizures.    Hyperlipidemia 2/7/2012 02/07/2012--treatment for hyperlipidemia begun.    Hypoactive sexual desire disorder 6/13/2019    Impaired fasting glucose 4/29/2019 April 29, 2019--routine follow-up.  Fasting serum glucose elevated at 113.  Recommend decreased carbohydrate diet, exercise, weight loss.    Migraine headaches 10/22/2013    08/19/2014--MRI again performed for left sided facial and arm numbness and tingling.  This reveals status post resection of a meningioma via a left temporoparietal craniotomy.  There is no evidence to suggest residual or recurrent tumor.  Again noted is a focus of encephalomalacia within the anterior portion of the left temporal lobe deep to the craniotomy flap.  10/29/2013--echocardiogram performed for possible TIA symptoms revealed normal left ventricular size thickness and function. There were no significant valvular abnormalities.  10/24/2013--MRI performed for possible TIA. Prior large 10 cm lateral left temporoparietal craniotomy and an additional 3.5 cm mid to inferior left  temporal craniectomy  by history resection of a meningioma. There is encephalomalacia in anterior inferior left temporal lobe which tracks approximately 3 x 2.1 cm. The remainder of the brain parenchyma is normal in signal intensity. Ventricles are normal. No mass effect noted. No midline shift and no extra-axial fluid collecti    Non morbid obesity 2/25/2019    Vitamin D deficiency 4/14/2016         Past  Surgical History:   Procedure Laterality Date    BRAIN MENINGIOMA EXCISION  26 years old    26 years of age--status post resection of benign brain tumor believed to be a meningioma. Located in the anterior aspect of the left temporal lobe. 10/24/2013--MRI of the brain performed for possible TIA revealed surgical changes of a lateral left temporal parietal craniotomy as well as mid to inferior squamosal left temporal craniotomy consistent with patient's previous surgery. There was a 3.5 x    COLONOSCOPY  08/20/2015 08/20/2015--entirely normal screening colonoscopy.    LAPAROSCOPIC HYSTERECTOMY  2010 or so    due to abnormal bleeding due to fibroids, done at [facility], unable to recall physician    TUBAL ABDOMINAL LIGATION  28 years old    28 years old--tubal ligation.         No Known Allergies        Current Outpatient Medications:     ALPRAZolam (XANAX) 0.5 MG tablet, Take 0.5-1 tablets by mouth 2 (Two) Times a Day As Needed for Anxiety., Disp: 60 tablet, Rfl: 2    ezetimibe-simvastatin (VYTORIN) 10-20 MG per tablet, TAKE ONE TABLET BY MOUTH DAILY FOR HIGH CHOLESTEROL AS DIRECTED, Disp: 30 tablet, Rfl: 5    olmesartan-hydrochlorothiazide (BENICAR HCT) 40-25 MG per tablet, Take 1 tablet by mouth Daily., Disp: 30 tablet, Rfl: 5    propranolol LA (Inderal LA) 120 MG 24 hr capsule, Take 1 p.o. daily for migraine headache prevention and blood pressure., Disp: 30 capsule, Rfl: 5    vitamin D3 125 MCG (5000 UT) capsule capsule, Take 5000 units of vitamin D daily as directed, Disp: 30 capsule, Rfl: 11      Family History   Problem Relation Age of Onset    Throat cancer Father     Stroke Father     Hypertension Other         Benign Essential    Stroke Brother     Breast cancer Neg Hx     Ovarian cancer Neg Hx     Uterine cancer Neg Hx     Colon cancer Neg Hx     Pancreatic cancer Neg Hx          Social History     Socioeconomic History    Marital status: Legally    Tobacco Use    Smoking status: Never    Smokeless  "tobacco: Never   Vaping Use    Vaping Use: Never used   Substance and Sexual Activity    Alcohol use: Not Currently     Comment: Socially    Drug use: No    Sexual activity: Yes     Partners: Male     Birth control/protection: Surgical     Comment: hysterectomy          Vitals:    10/23/23 1507   BP: 108/66   BP Location: Left arm   Patient Position: Sitting   Cuff Size: Adult   Pulse: 73   Resp: 14   SpO2: 99%   Weight: 97 kg (213 lb 12.8 oz)   Height: 157 cm (61.81\")        Body mass index is 39.35 kg/m².      Physical Exam:    General: Alert and oriented x 3.  No acute distress.  Obese.  Normal affect.  HEENT: Pupils equal, round, reactive to light; extraocular movements intact; sclerae nonicteric; pharynx, ear canals and TMs normal.  Neck: Without JVD, thyromegaly, bruit, or adenopathy.  Lungs: Clear to auscultation in all fields.  Heart: Regular rate and rhythm without murmur, rub, gallop, or click.  Abdomen: Soft, nontender, without hepatosplenomegaly or hernia.  Bowel sounds normal.  : Deferred.  Rectal: Deferred.  Extremities: Without clubbing, cyanosis, edema, or pulse deficit.  Neurologic: Intact without focal deficit.  Normal station and gait observed during ingress and egress from the examination room.  Skin: Without significant lesion.  Musculoskeletal: Unremarkable.    Lab/other results:      Assessment/Plan:     Diagnosis Plan   1. Routine physical examination  Comprehensive Metabolic Panel    CK    CBC (No Diff)    Hemoglobin A1c    NMR LipoProfile    TSH    T4, Free    T3, Free    Urinalysis With Microscopic If Indicated (No Culture) - Urine, Clean Catch    Vitamin D,25-Hydroxy    SARS-CoV-2 Antibodies, Nucleocapsid (Natural Immunity)      2. Impaired fasting glucose  Comprehensive Metabolic Panel    Hemoglobin A1c    Urinalysis With Microscopic If Indicated (No Culture) - Urine, Clean Catch    Comprehensive Metabolic Panel    Hemoglobin A1c      3. Hyperlipidemia  Comprehensive Metabolic Panel    " CK    NMR LipoProfile    TSH    T4, Free    T3, Free    Comprehensive Metabolic Panel    CK    NMR LipoProfile    ezetimibe-simvastatin (VYTORIN) 10-20 MG per tablet      4. Benign essential hypertension  Comprehensive Metabolic Panel    Hemoglobin A1c    olmesartan-hydrochlorothiazide (BENICAR HCT) 40-25 MG per tablet    propranolol LA (Inderal LA) 120 MG 24 hr capsule      5. Vitamin D deficiency  Vitamin D,25-Hydroxy    Vitamin D,25-Hydroxy      6. History of 2019 novel coronavirus disease (COVID-19)  SARS-CoV-2 Antibodies, Nucleocapsid (Natural Immunity)      7. Carotid artery plaque, 12/30/2019--negative MRA neck.  02/01/2018--16-49% bilateral.  10/29/2013--right ICA plaque.  Normal left ICA.  Duplex Carotid Ultrasound CAR      8. Depression with anxiety        9. Anxiety disorder due to general medical condition  ALPRAZolam (XANAX) 0.5 MG tablet      10. Chronic migraine w/o aura w/o status migrainosus, not intractable  propranolol LA (Inderal LA) 120 MG 24 hr capsule      11. Non morbid obesity        12. Therapeutic drug monitoring  CBC (No Diff)      13. Encounter for screening mammogram for malignant neoplasm of breast  Mammo Screening Bilateral With CAD        Patient presents for routine annual physical exam and has several underlying chronic medical problems that we cannot totally assess due to the fact that blood work was not done.  She has Non morbid obesity and impaired fasting glucose and is at risk for diabetes.  I have strongly recommended low carbohydrate diet, exercise, and attainment of ideal body weight.    Several preventative health issues discussed including review of vaccinations and recommendations, including dietary issues, exercise and weight loss.  Safe sex practices discussed.  Patient advised to wear seatbelt whenever driving and avoid texting and driving.  Also advised to look both ways before crossing the street.  Colon cancer prevention discussed and is up-to-date with  colonoscopy.  Advised to avoid tobacco products and minimize alcohol consumption.    Plan is as follows: Set up fasting blood work.  I will follow-up with patient on phone for the results and possible further instructions including possible follow-up visit.  No change in current medical regimen at the present time.  Mammogram and carotid Doppler study ordered and I encouraged patient to have these done.  I will have her follow-up in 6 months with lab prior or follow-up as needed.    Class 2 Severe Obesity (BMI >=35 and <=39.9). Obesity-related health conditions include the following: hypertension, dyslipidemias, osteoarthritis, and impaired fasting glucose . Obesity is unchanged. BMI is is above average; BMI management plan is completed. We discussed low calorie, low carb based diet program, portion control, increasing exercise, and joining a fitness center or start home based exercise program.       Procedures

## 2023-10-24 LAB
25(OH)D3+25(OH)D2 SERPL-MCNC: 34.9 NG/ML (ref 30–100)
ALBUMIN SERPL-MCNC: 4.5 G/DL (ref 3.8–4.9)
ALBUMIN/GLOB SERPL: 1.6 {RATIO} (ref 1.2–2.2)
ALP SERPL-CCNC: 90 IU/L (ref 44–121)
ALT SERPL-CCNC: 30 IU/L (ref 0–32)
APPEARANCE UR: ABNORMAL
AST SERPL-CCNC: 23 IU/L (ref 0–40)
BILIRUB SERPL-MCNC: 0.6 MG/DL (ref 0–1.2)
BILIRUB UR QL STRIP: NEGATIVE
BUN SERPL-MCNC: 9 MG/DL (ref 6–24)
BUN/CREAT SERPL: 9 (ref 9–23)
CALCIUM SERPL-MCNC: 10 MG/DL (ref 8.7–10.2)
CHLORIDE SERPL-SCNC: 103 MMOL/L (ref 96–106)
CHOLEST SERPL-MCNC: 159 MG/DL (ref 100–199)
CK SERPL-CCNC: 249 U/L (ref 32–182)
CO2 SERPL-SCNC: 26 MMOL/L (ref 20–29)
COLOR UR: YELLOW
CREAT SERPL-MCNC: 0.98 MG/DL (ref 0.57–1)
EGFRCR SERPLBLD CKD-EPI 2021: 66 ML/MIN/1.73
ERYTHROCYTE [DISTWIDTH] IN BLOOD BY AUTOMATED COUNT: 13 % (ref 11.7–15.4)
GLOBULIN SER CALC-MCNC: 2.9 G/DL (ref 1.5–4.5)
GLUCOSE SERPL-MCNC: 103 MG/DL (ref 70–99)
GLUCOSE UR QL STRIP: NEGATIVE
HBA1C MFR BLD: 6.1 % (ref 4.8–5.6)
HCT VFR BLD AUTO: 39.6 % (ref 34–46.6)
HDL SERPL-SCNC: 26.3 UMOL/L
HDLC SERPL-MCNC: 41 MG/DL
HGB BLD-MCNC: 13.6 G/DL (ref 11.1–15.9)
HGB UR QL STRIP: NEGATIVE
KETONES UR QL STRIP: NEGATIVE
LDL SERPL QN: 21.5 NM
LDL SERPL-SCNC: 1246 NMOL/L
LDL SMALL SERPL-SCNC: 422 NMOL/L
LDLC SERPL CALC-MCNC: 99 MG/DL (ref 0–99)
LEUKOCYTE ESTERASE UR QL STRIP: NEGATIVE
MCH RBC QN AUTO: 30.5 PG (ref 26.6–33)
MCHC RBC AUTO-ENTMCNC: 34.3 G/DL (ref 31.5–35.7)
MCV RBC AUTO: 89 FL (ref 79–97)
MICRO URNS: ABNORMAL
NITRITE UR QL STRIP: NEGATIVE
PH UR STRIP: 5.5 [PH] (ref 5–7.5)
PLATELET # BLD AUTO: 294 X10E3/UL (ref 150–450)
POTASSIUM SERPL-SCNC: 4.1 MMOL/L (ref 3.5–5.2)
PROT SERPL-MCNC: 7.4 G/DL (ref 6–8.5)
PROT UR QL STRIP: NEGATIVE
RBC # BLD AUTO: 4.46 X10E6/UL (ref 3.77–5.28)
SARS-COV-2 AB SERPL QL IA: POSITIVE
SODIUM SERPL-SCNC: 143 MMOL/L (ref 134–144)
SP GR UR STRIP: 1.02 (ref 1–1.03)
T3FREE SERPL-MCNC: 2.5 PG/ML (ref 2–4.4)
T4 FREE SERPL-MCNC: 1.24 NG/DL (ref 0.82–1.77)
TRIGL SERPL-MCNC: 104 MG/DL (ref 0–149)
TSH SERPL DL<=0.005 MIU/L-ACNC: 1.56 UIU/ML (ref 0.45–4.5)
UROBILINOGEN UR STRIP-MCNC: 0.2 MG/DL (ref 0.2–1)
WBC # BLD AUTO: 8.1 X10E3/UL (ref 3.4–10.8)

## 2023-10-31 ENCOUNTER — TRANSCRIBE ORDERS (OUTPATIENT)
Dept: ADMINISTRATIVE | Facility: HOSPITAL | Age: 58
End: 2023-10-31
Payer: COMMERCIAL

## 2023-10-31 DIAGNOSIS — Z12.31 VISIT FOR SCREENING MAMMOGRAM: Primary | ICD-10-CM

## 2023-11-09 ENCOUNTER — HOSPITAL ENCOUNTER (OUTPATIENT)
Dept: CARDIOLOGY | Facility: HOSPITAL | Age: 58
Discharge: HOME | End: 2023-11-09
Payer: COMMERCIAL

## 2023-11-09 ENCOUNTER — HOSPITAL ENCOUNTER (OUTPATIENT)
Dept: MAMMOGRAPHY | Facility: HOSPITAL | Age: 58
End: 2023-11-09
Payer: COMMERCIAL

## 2023-11-09 ENCOUNTER — HOSPITAL ENCOUNTER (OUTPATIENT)
Dept: MAMMOGRAPHY | Facility: HOSPITAL | Age: 58
Discharge: HOME | End: 2023-11-09
Payer: COMMERCIAL

## 2023-11-09 DIAGNOSIS — Z12.31 VISIT FOR SCREENING MAMMOGRAM: ICD-10-CM

## 2023-11-09 LAB
BH CV XLRA MEAS LEFT DIST CCA EDV: -25.8 CM/SEC
BH CV XLRA MEAS LEFT DIST CCA PSV: -83.3 CM/SEC
BH CV XLRA MEAS LEFT DIST ICA EDV: -23.6 CM/SEC
BH CV XLRA MEAS LEFT DIST ICA PSV: -88.1 CM/SEC
BH CV XLRA MEAS LEFT ICA/CCA RATIO: 1.06
BH CV XLRA MEAS LEFT MID ICA EDV: -30.4 CM/SEC
BH CV XLRA MEAS LEFT MID ICA PSV: -77.6 CM/SEC
BH CV XLRA MEAS LEFT PROX CCA EDV: 30.4 CM/SEC
BH CV XLRA MEAS LEFT PROX CCA PSV: 125 CM/SEC
BH CV XLRA MEAS LEFT PROX ECA EDV: -30.7 CM/SEC
BH CV XLRA MEAS LEFT PROX ECA PSV: -155 CM/SEC
BH CV XLRA MEAS LEFT PROX ICA EDV: -16.8 CM/SEC
BH CV XLRA MEAS LEFT PROX ICA PSV: -69.7 CM/SEC
BH CV XLRA MEAS LEFT PROX SCLA PSV: 171 CM/SEC
BH CV XLRA MEAS LEFT VERTEBRAL A EDV: 6.2 CM/SEC
BH CV XLRA MEAS LEFT VERTEBRAL A PSV: 35.1 CM/SEC
BH CV XLRA MEAS RIGHT DIST CCA EDV: -21.1 CM/SEC
BH CV XLRA MEAS RIGHT DIST CCA PSV: -91.5 CM/SEC
BH CV XLRA MEAS RIGHT DIST ICA EDV: -34 CM/SEC
BH CV XLRA MEAS RIGHT DIST ICA PSV: -83.8 CM/SEC
BH CV XLRA MEAS RIGHT ICA/CCA RATIO: 1.01
BH CV XLRA MEAS RIGHT MID ICA EDV: -40.5 CM/SEC
BH CV XLRA MEAS RIGHT MID ICA PSV: -92 CM/SEC
BH CV XLRA MEAS RIGHT PROX CCA EDV: 21.7 CM/SEC
BH CV XLRA MEAS RIGHT PROX CCA PSV: 104 CM/SEC
BH CV XLRA MEAS RIGHT PROX ECA EDV: -10.2 CM/SEC
BH CV XLRA MEAS RIGHT PROX ECA PSV: -135 CM/SEC
BH CV XLRA MEAS RIGHT PROX ICA EDV: -14.9 CM/SEC
BH CV XLRA MEAS RIGHT PROX ICA PSV: -71.5 CM/SEC
BH CV XLRA MEAS RIGHT PROX SCLA PSV: 140 CM/SEC
BH CV XLRA MEAS RIGHT VERTEBRAL A EDV: -16.9 CM/SEC
BH CV XLRA MEAS RIGHT VERTEBRAL A PSV: -47.1 CM/SEC
LEFT ARM BP: NORMAL MMHG
RIGHT ARM BP: NORMAL MMHG

## 2023-11-09 PROCEDURE — 77067 SCR MAMMO BI INCL CAD: CPT

## 2023-11-09 PROCEDURE — 77063 BREAST TOMOSYNTHESIS BI: CPT

## 2023-11-09 PROCEDURE — 93880 EXTRACRANIAL BILAT STUDY: CPT

## 2023-11-15 DIAGNOSIS — R92.8 ABNORMAL MAMMOGRAM OF RIGHT BREAST: Primary | ICD-10-CM

## 2024-01-11 ENCOUNTER — TELEPHONE (OUTPATIENT)
Dept: INTERNAL MEDICINE | Facility: CLINIC | Age: 59
End: 2024-01-11
Payer: COMMERCIAL

## 2024-03-04 ENCOUNTER — TELEPHONE (OUTPATIENT)
Dept: OBSTETRICS AND GYNECOLOGY | Age: 59
End: 2024-03-04

## 2024-03-04 NOTE — TELEPHONE ENCOUNTER
Provider: LOC SANDY    Caller: KEYANNA MCLAUGHLIN    Relationship to Patient: SELF      Phone Number: [phone]    Reason for Call: PT WANTS TO KNOW IF THERE IS ANYTHING SHE CAN TAKE OTC FOR VAGINAL DRYNESS. PLEASE CALL PT TO ADVISE.

## 2024-03-04 NOTE — TELEPHONE ENCOUNTER
Spoke with pt. Advised pt she can use Replens vaginal moisturizer OTC per [facility]'s recommendations. Pt has no further questions or concerns.

## 2024-04-26 ENCOUNTER — TELEPHONE (OUTPATIENT)
Dept: OBSTETRICS AND GYNECOLOGY | Facility: CLINIC | Age: 59
End: 2024-04-26
Payer: COMMERCIAL

## 2024-04-26 NOTE — TELEPHONE ENCOUNTER
Called pt about n/s on 4/22/24, pt requested a female and declined to schedule due to being booked too far out. Requested to be placed on wait list- [email or web address]

## 2024-06-15 DIAGNOSIS — F06.4 ANXIETY DISORDER DUE TO GENERAL MEDICAL CONDITION: Chronic | ICD-10-CM

## 2024-06-18 RX ORDER — ALPRAZOLAM 0.5 MG/1
TABLET ORAL
Qty: 60 TABLET | Refills: 2 | Status: SHIPPED | OUTPATIENT
Start: 2024-06-18

## 2024-07-14 DIAGNOSIS — G43.709 CHRONIC MIGRAINE W/O AURA W/O STATUS MIGRAINOSUS, NOT INTRACTABLE: Chronic | ICD-10-CM

## 2024-07-14 DIAGNOSIS — I10 BENIGN ESSENTIAL HYPERTENSION: Chronic | ICD-10-CM

## 2024-07-15 RX ORDER — PROPRANOLOL HYDROCHLORIDE 120 MG/1
CAPSULE, EXTENDED RELEASE ORAL
Qty: 30 CAPSULE | Refills: 1 | Status: SHIPPED | OUTPATIENT
Start: 2024-07-15

## 2024-08-20 ENCOUNTER — TELEPHONE (OUTPATIENT)
Dept: INTERNAL MEDICINE | Facility: CLINIC | Age: 59
End: 2024-08-20

## 2024-08-21 DIAGNOSIS — E78.2 MIXED HYPERLIPIDEMIA: Chronic | ICD-10-CM

## 2024-08-21 DIAGNOSIS — I10 BENIGN ESSENTIAL HYPERTENSION: Chronic | ICD-10-CM

## 2024-08-22 RX ORDER — OLMESARTAN MEDOXOMIL AND HYDROCHLOROTHIAZIDE 40/25 40; 25 MG/1; MG/1
1 TABLET ORAL DAILY
Qty: 30 TABLET | Refills: 5 | Status: SHIPPED | OUTPATIENT
Start: 2024-08-22

## 2024-08-22 RX ORDER — EZETIMIBE AND SIMVASTATIN 10; 20 MG/1; MG/1
TABLET ORAL
Qty: 30 TABLET | Refills: 5 | Status: SHIPPED | OUTPATIENT
Start: 2024-08-22

## 2024-09-27 DIAGNOSIS — G43.709 CHRONIC MIGRAINE W/O AURA W/O STATUS MIGRAINOSUS, NOT INTRACTABLE: Chronic | ICD-10-CM

## 2024-09-27 DIAGNOSIS — E78.2 MIXED HYPERLIPIDEMIA: Chronic | ICD-10-CM

## 2024-09-27 DIAGNOSIS — I10 BENIGN ESSENTIAL HYPERTENSION: Chronic | ICD-10-CM

## 2024-09-27 RX ORDER — EZETIMIBE AND SIMVASTATIN 10; 20 MG/1; MG/1
TABLET ORAL
Qty: 90 TABLET | Refills: 2 | Status: SHIPPED | OUTPATIENT
Start: 2024-09-27

## 2024-09-27 RX ORDER — PROPRANOLOL HYDROCHLORIDE 120 MG/1
CAPSULE, EXTENDED RELEASE ORAL
Qty: 90 CAPSULE | Refills: 3 | Status: SHIPPED | OUTPATIENT
Start: 2024-09-27

## 2024-12-30 DIAGNOSIS — R73.01 IMPAIRED FASTING GLUCOSE: Chronic | ICD-10-CM

## 2024-12-30 DIAGNOSIS — Z00.00 ROUTINE PHYSICAL EXAMINATION: ICD-10-CM

## 2024-12-30 DIAGNOSIS — L68.0 HIRSUTISM: Chronic | ICD-10-CM

## 2024-12-30 DIAGNOSIS — E55.9 VITAMIN D DEFICIENCY: Chronic | ICD-10-CM

## 2024-12-30 DIAGNOSIS — E78.2 MIXED HYPERLIPIDEMIA: Chronic | ICD-10-CM

## 2024-12-30 DIAGNOSIS — I10 BENIGN ESSENTIAL HYPERTENSION: Primary | Chronic | ICD-10-CM

## 2024-12-31 DIAGNOSIS — F06.4 ANXIETY DISORDER DUE TO GENERAL MEDICAL CONDITION: Chronic | ICD-10-CM

## 2024-12-31 RX ORDER — ALPRAZOLAM 0.5 MG
TABLET ORAL
Qty: 60 TABLET | Refills: 2 | Status: SHIPPED | OUTPATIENT
Start: 2024-12-31

## 2025-03-05 DIAGNOSIS — I10 BENIGN ESSENTIAL HYPERTENSION: Chronic | ICD-10-CM

## 2025-03-05 RX ORDER — OLMESARTAN MEDOXOMIL AND HYDROCHLOROTHIAZIDE 40/25 40; 25 MG/1; MG/1
TABLET ORAL
Qty: 30 TABLET | Refills: 5 | Status: SHIPPED | OUTPATIENT
Start: 2025-03-05

## 2025-04-15 ENCOUNTER — TELEPHONE (OUTPATIENT)
Dept: INTERNAL MEDICINE | Facility: CLINIC | Age: 60
End: 2025-04-15

## 2025-04-15 NOTE — TELEPHONE ENCOUNTER
Caller: Kelli Ohara    Relationship: Self    Best call back number: [phone]     Which medication are you concerned about: CHOLESTEROL MEDICATION (PATIENT DIDN'T KNOW THE NAME OF THE MEDICATION)    Who prescribed you this medication: DR OTOOLE        What are your concerns:     PATIENT'S MEDICATION HAS GONE UP IN COST AND PATIENT IS WANTING TO KNOW IF THERE IS A GENERIC MEDICATION THAT YOU CAN PRESCRIBE FOR HER AND IF SO SEND IT IN TO HER PHARMACY.  SHE HAS BEEN OUT OF MEDICATION FOR THREE DAYS.    [facility] [address] [place] [address] AT [facility] - [phone] [place] [phone] FX       PLEASE ADVISE

## 2025-04-16 DIAGNOSIS — E78.2 MIXED HYPERLIPIDEMIA: Primary | Chronic | ICD-10-CM

## 2025-04-16 RX ORDER — SIMVASTATIN 20 MG
20 TABLET ORAL NIGHTLY
Qty: 90 TABLET | Refills: 1 | Status: SHIPPED | OUTPATIENT
Start: 2025-04-16

## 2025-04-16 RX ORDER — EZETIMIBE 10 MG/1
10 TABLET ORAL DAILY
Qty: 90 TABLET | Refills: 1 | Status: SHIPPED | OUTPATIENT
Start: 2025-04-16

## 2025-06-24 DIAGNOSIS — F06.4 ANXIETY DISORDER DUE TO GENERAL MEDICAL CONDITION: Chronic | ICD-10-CM

## 2025-06-25 RX ORDER — ALPRAZOLAM 0.5 MG
TABLET ORAL
Qty: 60 TABLET | Refills: 2 | Status: SHIPPED | OUTPATIENT
Start: 2025-06-25

## 2025-08-27 DIAGNOSIS — E78.2 MIXED HYPERLIPIDEMIA: Chronic | ICD-10-CM

## 2025-08-27 RX ORDER — EZETIMIBE AND SIMVASTATIN 10; 20 MG/1; MG/1
TABLET ORAL
Qty: 90 TABLET | Refills: 2 | OUTPATIENT
Start: 2025-08-27